# Patient Record
Sex: MALE | Race: BLACK OR AFRICAN AMERICAN | NOT HISPANIC OR LATINO | URBAN - METROPOLITAN AREA
[De-identification: names, ages, dates, MRNs, and addresses within clinical notes are randomized per-mention and may not be internally consistent; named-entity substitution may affect disease eponyms.]

---

## 2021-01-13 ENCOUNTER — EMERGENCY (EMERGENCY)
Facility: HOSPITAL | Age: 51
LOS: 1 days | Discharge: HOME | End: 2021-01-13
Attending: EMERGENCY MEDICINE | Admitting: EMERGENCY MEDICINE
Payer: SELF-PAY

## 2021-01-13 VITALS
HEIGHT: 77 IN | HEART RATE: 82 BPM | WEIGHT: 315 LBS | DIASTOLIC BLOOD PRESSURE: 103 MMHG | RESPIRATION RATE: 18 BRPM | OXYGEN SATURATION: 98 % | SYSTOLIC BLOOD PRESSURE: 163 MMHG | TEMPERATURE: 99 F

## 2021-01-13 DIAGNOSIS — R39.9 UNSPECIFIED SYMPTOMS AND SIGNS INVOLVING THE GENITOURINARY SYSTEM: ICD-10-CM

## 2021-01-13 DIAGNOSIS — N48.89 OTHER SPECIFIED DISORDERS OF PENIS: ICD-10-CM

## 2021-01-13 LAB
APPEARANCE UR: CLEAR — SIGNIFICANT CHANGE UP
BILIRUB UR-MCNC: NEGATIVE — SIGNIFICANT CHANGE UP
COLOR SPEC: YELLOW — SIGNIFICANT CHANGE UP
DIFF PNL FLD: NEGATIVE — SIGNIFICANT CHANGE UP
GLUCOSE BLDC GLUCOMTR-MCNC: 83 MG/DL — SIGNIFICANT CHANGE UP (ref 70–99)
GLUCOSE UR QL: NEGATIVE — SIGNIFICANT CHANGE UP
KETONES UR-MCNC: NEGATIVE — SIGNIFICANT CHANGE UP
LEUKOCYTE ESTERASE UR-ACNC: NEGATIVE — SIGNIFICANT CHANGE UP
NITRITE UR-MCNC: NEGATIVE — SIGNIFICANT CHANGE UP
PH UR: 5.5 — SIGNIFICANT CHANGE UP (ref 5–8)
PROT UR-MCNC: NEGATIVE MG/DL — SIGNIFICANT CHANGE UP
SP GR SPEC: 1.02 — SIGNIFICANT CHANGE UP (ref 1–1.03)
UROBILINOGEN FLD QL: 0.2 E.U./DL — SIGNIFICANT CHANGE UP

## 2021-01-13 PROCEDURE — 99283 EMERGENCY DEPT VISIT LOW MDM: CPT

## 2021-01-13 RX ORDER — NYSTATIN CREAM 100000 [USP'U]/G
1 CREAM TOPICAL
Qty: 1 | Refills: 0
Start: 2021-01-13 | End: 2021-01-26

## 2021-01-13 NOTE — ED PROVIDER NOTE - CARE PROVIDER_API CALL
Dhruv Tejeda (DO)  [address]  [address], [place] [address]  Phone: [phone]  Fax: [phone]  Follow Up Time:     Anabell Geiger (DO)  [address] [address]  Phone: [phone]  Fax: [phone]  Follow Up Time:

## 2021-01-13 NOTE — ED PROVIDER NOTE - GENITOURINARY, MLM
No discharge, lesions. chaperone RN Laxmi Peralta, circumcised penis, some dryness in the base of the glans, otherwise no lesions, no discharge, normal testicles, non tender

## 2021-01-13 NOTE — ED PROVIDER NOTE - CARE PROVIDERS DIRECT ADDRESSES
[email or web address]

## 2021-01-13 NOTE — ED PROVIDER NOTE - OBJECTIVE STATEMENT
49 yo male pt, recent immigrant from [facility] in [place] (2 months ago), states he has had an infection in his penis in the past, treated 2 months ago and now has it again. Pt has trouble describing symptoms and what type of infection he has. no fever, no chills, no dysuria, no abd pain, no lymphadenopathy. Tried language line several times and [employer] interpreters were not available.

## 2021-01-13 NOTE — ED ADULT NURSE NOTE - CHPI ED NUR SYMPTOMS NEG
no chills/no cough/no decreased eating/drinking/no diarrhea/no fever/no headache/no rash/no shortness of breath/no vomiting

## 2021-01-13 NOTE — ED ADULT NURSE NOTE - OBJECTIVE STATEMENT
Pt. with no pmhx/pshx, presents with concerns about penial swelling. Pt. states his wife had an infection and he was given a "shot" but does not recall what infection or name of medication. Pt. states Pt. denies SOB, cp/palpitations, fever, chills, n/v/d or abdominal pain.

## 2021-01-13 NOTE — ED PROVIDER NOTE - PATIENT PORTAL LINK FT
You can access the FollowMyHealth Patient Portal offered by [facility] by registering at the following website: [email or web address]. By joining [employer]’s FollowMyHealth portal, you will also be able to view your health information using other applications (apps) compatible with our system.

## 2021-01-13 NOTE — ED PROVIDER NOTE - CLINICAL SUMMARY MEDICAL DECISION MAKING FREE TEXT BOX
Pt w unclear infection of his penis, pt having difficulty describing it in English, main language [employer], tried  several times and no [employer] interpreters available. Pt w unclear infection of his penis, pt having difficulty describing it in English, main language [employer], tried  several times and no [employer] interpreters available.    Will treat as fungal infection w topical medication. Encouraged primary care follow up as pt is new to [place]. Pt w unclear infection of his penis, pt having difficulty describing it in English, main language [employer], tried  several times and no [employer] interpreters available.    Will treat as fungal infection w topical medication. Encouraged primary care follow up as pt is new to [place]. normal FS. no signs of bacterial infection on examination

## 2021-01-15 LAB
CULTURE RESULTS: NO GROWTH — SIGNIFICANT CHANGE UP
SPECIMEN SOURCE: SIGNIFICANT CHANGE UP

## 2021-03-03 ENCOUNTER — EMERGENCY (EMERGENCY)
Facility: HOSPITAL | Age: 51
LOS: 1 days | Discharge: HOME | End: 2021-03-03
Attending: EMERGENCY MEDICINE | Admitting: EMERGENCY MEDICINE
Payer: MEDICAID

## 2021-03-03 VITALS
SYSTOLIC BLOOD PRESSURE: 147 MMHG | OXYGEN SATURATION: 97 % | WEIGHT: 315 LBS | TEMPERATURE: 98 F | HEIGHT: 77 IN | RESPIRATION RATE: 16 BRPM | HEART RATE: 95 BPM | DIASTOLIC BLOOD PRESSURE: 95 MMHG

## 2021-03-03 DIAGNOSIS — N48.89 OTHER SPECIFIED DISORDERS OF PENIS: ICD-10-CM

## 2021-03-03 DIAGNOSIS — N48.1 BALANITIS: ICD-10-CM

## 2021-03-03 PROCEDURE — 99283 EMERGENCY DEPT VISIT LOW MDM: CPT

## 2021-03-03 RX ORDER — NYSTATIN CREAM 100000 [USP'U]/G
1 CREAM TOPICAL
Qty: 1 | Refills: 0
Start: 2021-03-03 | End: 2021-03-16

## 2021-03-03 NOTE — ED PROVIDER NOTE - OBJECTIVE STATEMENT
50 m co redness/swelling to skin on penis + itch- sx started this morning  no f/c no penile d/c  last sex contact was 5 days ago  no high risk sexual contact  no hx of hiv/dm

## 2021-03-03 NOTE — ED PROVIDER NOTE - INGUINAL REGION
+ circumcised- mild swelling, local irritation and redness to skin near head of penis, no phimosis/paraphimosis

## 2021-03-03 NOTE — ED ADULT NURSE NOTE - OBJECTIVE STATEMENT
Pt presents to ED c/o swelling and itchiness to penis, last sexual contact x5days ago, denies any high risk for STD

## 2021-03-03 NOTE — ED PROVIDER NOTE - NSFOLLOWUPINSTRUCTIONS_ED_ALL_ED_FT
BALANITIS - AfterCare(R) Instructions(ER/ED)           Balanitis    WHAT YOU NEED TO KNOW:    Balanitis is inflammation and possible infection of the glans (head) of the penis. It may be caused by fungus, bacteria, or an STD. It may also be caused by an allergic reaction to latex, spermicides, medicines such as antibiotics, or soaps. Balanitis usually happens due to poor hygiene practices.    DISCHARGE INSTRUCTIONS:    Return to the emergency department if:   •You have trouble urinating.          Call your doctor if:   •Your symptoms get worse.      •Your symptoms return after treatment is complete.      •You have questions or concerns about your condition or care.      Medicines:   •Medicines help fight or prevent an infection caused by bacteria or a fungus. This medicine may be given as a pill or a cream.      •Take your medicine as directed. Contact your healthcare provider if you think your medicine is not helping or if you have side effects. Tell him of her if you are allergic to any medicine. Keep a list of the medicines, vitamins, and herbs you take. Include the amounts, and when and why you take them. Bring the list or the pill bottles to follow-up visits. Carry your medicine list with you in case of an emergency.      Sit in a sitz bath 2 to 3 times a day to reduce swelling:   •Fill the bathtub 4 to 6 inches (10 to 15 cm) with clean warm water.      •Sit in the water for about 20 minutes each time.       Clean the area every day:   •Push back the foreskin before cleaning.      •Use a cotton swab to clean between the foreskin and the glans.      •Clean with water only. Do not use soap.      •Dry the area well.      •Pull the foreskin back into place.      Control your blood sugar levels if you have diabetes:  Follow your recommended diabetes management plan.    Follow up with your doctor within 2 days: Write down your questions so you remember to ask them during your visits.       © Copyright [employer] 2021           back to top                          © Copyright [employer] 2021

## 2021-03-03 NOTE — ED PROVIDER NOTE - PATIENT PORTAL LINK FT
You can access the FollowMyHealth Patient Portal offered by [facility] by registering at the following website: [email or web address]. By joining [employer]’s FollowMyHealth portal, you will also be able to view your health information using other applications (apps) compatible with our system.

## 2021-03-05 ENCOUNTER — APPOINTMENT (OUTPATIENT)
Dept: UROLOGY | Facility: CLINIC | Age: 51
End: 2021-03-05

## 2021-04-05 ENCOUNTER — APPOINTMENT (OUTPATIENT)
Dept: UROLOGY | Facility: CLINIC | Age: 51
End: 2021-04-05

## 2021-05-17 ENCOUNTER — APPOINTMENT (OUTPATIENT)
Dept: UROLOGY | Facility: CLINIC | Age: 51
End: 2021-05-17
Payer: MEDICAID

## 2021-05-17 VITALS — SYSTOLIC BLOOD PRESSURE: 148 MMHG | DIASTOLIC BLOOD PRESSURE: 101 MMHG | TEMPERATURE: 93.9 F | HEART RATE: 90 BPM

## 2021-05-17 DIAGNOSIS — N52.9 MALE ERECTILE DYSFUNCTION, UNSPECIFIED: ICD-10-CM

## 2021-05-17 DIAGNOSIS — R68.82 DECREASED LIBIDO: ICD-10-CM

## 2021-05-17 DIAGNOSIS — K21.9 GASTRO-ESOPHAGEAL REFLUX DISEASE W/OUT ESOPHAGITIS: ICD-10-CM

## 2021-05-17 DIAGNOSIS — N46.9 MALE INFERTILITY, UNSPECIFIED: ICD-10-CM

## 2021-05-17 DIAGNOSIS — Z12.5 ENCOUNTER FOR SCREENING FOR MALIGNANT NEOPLASM OF PROSTATE: ICD-10-CM

## 2021-05-17 PROCEDURE — 99072 ADDL SUPL MATRL&STAF TM PHE: CPT

## 2021-05-17 PROCEDURE — 99204 OFFICE O/P NEW MOD 45 MIN: CPT

## 2021-05-17 RX ORDER — BETAMETHASONE DIPROPIONATE 0.5 MG/G
0.05 CREAM TOPICAL TWICE DAILY
Qty: 1 | Refills: 0 | Status: ACTIVE | COMMUNITY
Start: 2021-05-17 | End: 1900-01-01

## 2021-05-19 LAB
FSH SERPL-MCNC: 3.5 IU/L
LH SERPL-ACNC: 7.2 IU/L
PROLACTIN SERPL-MCNC: 15.9 NG/ML
PSA FREE FLD-MCNC: 14 %
PSA FREE SERPL-MCNC: 0.19 NG/ML
PSA SERPL-MCNC: 1.29 NG/ML

## 2021-05-20 NOTE — ED ADULT TRIAGE NOTE - NS ED NURSE BANDS TYPE
Local Anesthesia Pre Procedure Assessment    Informed Consent:    Consent Obtained: Yes     Procedure Assessment:    Preop Diagnosis/Indications for Procedure: Pain  Planned Procedure: b/l L4-5 TFESI  Planned Anesthetic: Local    Medical History/Comorbid Conditions:    Significant Medical/Surgical History: Yes (comment) (See H&P Note)  Normal Mental Status: Yes    Examination Pertinent to Procedure Being Performed:    Evaluation of Operative Site: Clean, no deformity, redness/warmth/swelling, no masses    Other Findings:    Reviewed Current Medications and Allergies: Yes    Pertinent Lab/Diagnostic Tests:    Pertinent Lab / Diagnostic Tests: Other (comment) (See Imaging section)    Vanda Carlson MD  Interventional Pain Management  [facility]  05/20/21  
Name band;

## 2021-05-21 LAB
TESTOST BND SERPL-MCNC: 8.2 PG/ML
TESTOST SERPL-MCNC: 249.1 NG/DL

## 2022-12-15 ENCOUNTER — EMERGENCY (EMERGENCY)
Facility: HOSPITAL | Age: 52
LOS: 1 days | Discharge: ANOTHER HOSPITAL | End: 2022-12-15
Attending: EMERGENCY MEDICINE | Admitting: EMERGENCY MEDICINE

## 2022-12-15 VITALS
WEIGHT: 315 LBS | HEART RATE: 93 BPM | TEMPERATURE: 98 F | SYSTOLIC BLOOD PRESSURE: 154 MMHG | OXYGEN SATURATION: 100 % | HEIGHT: 77 IN | DIASTOLIC BLOOD PRESSURE: 79 MMHG | RESPIRATION RATE: 15 BRPM

## 2022-12-15 DIAGNOSIS — Z20.822 CONTACT WITH AND (SUSPECTED) EXPOSURE TO COVID-19: ICD-10-CM

## 2022-12-15 DIAGNOSIS — M25.412 EFFUSION, LEFT SHOULDER: ICD-10-CM

## 2022-12-15 DIAGNOSIS — M25.512 PAIN IN LEFT SHOULDER: ICD-10-CM

## 2022-12-15 LAB
ALBUMIN SERPL ELPH-MCNC: 3 G/DL — LOW (ref 3.4–5)
ALP SERPL-CCNC: 90 U/L — SIGNIFICANT CHANGE UP (ref 40–120)
ALT FLD-CCNC: 33 U/L — SIGNIFICANT CHANGE UP (ref 12–42)
ANION GAP SERPL CALC-SCNC: 7 MMOL/L — LOW (ref 9–16)
APPEARANCE UR: CLEAR — SIGNIFICANT CHANGE UP
APTT BLD: 31.7 SEC — SIGNIFICANT CHANGE UP (ref 27.5–35.5)
AST SERPL-CCNC: 26 U/L — SIGNIFICANT CHANGE UP (ref 15–37)
B PERT IGG+IGM PNL SER: SIGNIFICANT CHANGE UP
BASOPHILS # BLD AUTO: 0.03 K/UL — SIGNIFICANT CHANGE UP (ref 0–0.2)
BASOPHILS NFR BLD AUTO: 0.4 % — SIGNIFICANT CHANGE UP (ref 0–2)
BILIRUB SERPL-MCNC: 0.6 MG/DL — SIGNIFICANT CHANGE UP (ref 0.2–1.2)
BILIRUB UR-MCNC: NEGATIVE — SIGNIFICANT CHANGE UP
BUN SERPL-MCNC: 12 MG/DL — SIGNIFICANT CHANGE UP (ref 7–23)
CALCIUM SERPL-MCNC: 9.1 MG/DL — SIGNIFICANT CHANGE UP (ref 8.5–10.5)
CHLORIDE SERPL-SCNC: 103 MMOL/L — SIGNIFICANT CHANGE UP (ref 96–108)
CO2 SERPL-SCNC: 27 MMOL/L — SIGNIFICANT CHANGE UP (ref 22–31)
COLOR FLD: YELLOW — SIGNIFICANT CHANGE UP
COLOR SPEC: YELLOW — SIGNIFICANT CHANGE UP
CREAT SERPL-MCNC: 1.04 MG/DL — SIGNIFICANT CHANGE UP (ref 0.5–1.3)
CRP SERPL-MCNC: >120 MG/L — HIGH (ref 0–9)
DIFF PNL FLD: NEGATIVE — SIGNIFICANT CHANGE UP
EGFR: 86 ML/MIN/1.73M2 — SIGNIFICANT CHANGE UP
EOSINOPHIL # BLD AUTO: 0.07 K/UL — SIGNIFICANT CHANGE UP (ref 0–0.5)
EOSINOPHIL NFR BLD AUTO: 0.8 % — SIGNIFICANT CHANGE UP (ref 0–6)
FLUAV AG NPH QL: SIGNIFICANT CHANGE UP
FLUBV AG NPH QL: SIGNIFICANT CHANGE UP
FLUID INTAKE SUBSTANCE CLASS: SIGNIFICANT CHANGE UP
GLUCOSE FLD-MCNC: 7 MG/DL — SIGNIFICANT CHANGE UP
GLUCOSE SERPL-MCNC: 101 MG/DL — HIGH (ref 70–99)
GLUCOSE UR QL: NEGATIVE — SIGNIFICANT CHANGE UP
GRAM STN FLD: SIGNIFICANT CHANGE UP
HCT VFR BLD CALC: 36.2 % — LOW (ref 39–50)
HGB BLD-MCNC: 11.5 G/DL — LOW (ref 13–17)
IMM GRANULOCYTES NFR BLD AUTO: 0.2 % — SIGNIFICANT CHANGE UP (ref 0–0.9)
INR BLD: 1.18 — HIGH (ref 0.88–1.16)
KETONES UR-MCNC: NEGATIVE — SIGNIFICANT CHANGE UP
LACTATE SERPL-SCNC: 0.3 MMOL/L — LOW (ref 0.4–2)
LEUKOCYTE ESTERASE UR-ACNC: NEGATIVE — SIGNIFICANT CHANGE UP
LYMPHOCYTES # BLD AUTO: 1.32 K/UL — SIGNIFICANT CHANGE UP (ref 1–3.3)
LYMPHOCYTES # BLD AUTO: 16 % — SIGNIFICANT CHANGE UP (ref 13–44)
LYMPHOCYTES # FLD: 3 % — SIGNIFICANT CHANGE UP
MCHC RBC-ENTMCNC: 27.4 PG — SIGNIFICANT CHANGE UP (ref 27–34)
MCHC RBC-ENTMCNC: 31.8 GM/DL — LOW (ref 32–36)
MCV RBC AUTO: 86.2 FL — SIGNIFICANT CHANGE UP (ref 80–100)
MONOCYTES # BLD AUTO: 0.72 K/UL — SIGNIFICANT CHANGE UP (ref 0–0.9)
MONOCYTES NFR BLD AUTO: 8.7 % — SIGNIFICANT CHANGE UP (ref 2–14)
MONOS+MACROS # FLD: 2 % — SIGNIFICANT CHANGE UP
NEUTROPHILS # BLD AUTO: 6.09 K/UL — SIGNIFICANT CHANGE UP (ref 1.8–7.4)
NEUTROPHILS NFR BLD AUTO: 73.9 % — SIGNIFICANT CHANGE UP (ref 43–77)
NEUTROPHILS-BODY FLUID: 95 % — SIGNIFICANT CHANGE UP
NITRITE UR-MCNC: NEGATIVE — SIGNIFICANT CHANGE UP
NRBC # BLD: 0 /100 WBCS — SIGNIFICANT CHANGE UP (ref 0–0)
PH UR: 6 — SIGNIFICANT CHANGE UP (ref 5–8)
PLATELET # BLD AUTO: 369 K/UL — SIGNIFICANT CHANGE UP (ref 150–400)
POTASSIUM SERPL-MCNC: 3.9 MMOL/L — SIGNIFICANT CHANGE UP (ref 3.5–5.3)
POTASSIUM SERPL-SCNC: 3.9 MMOL/L — SIGNIFICANT CHANGE UP (ref 3.5–5.3)
PROT FLD-MCNC: 6.9 G/DL — SIGNIFICANT CHANGE UP
PROT SERPL-MCNC: 8.9 G/DL — HIGH (ref 6.4–8.2)
PROT UR-MCNC: NEGATIVE MG/DL — SIGNIFICANT CHANGE UP
PROTHROM AB SERPL-ACNC: 13.7 SEC — HIGH (ref 10.5–13.4)
RBC # BLD: 4.2 M/UL — SIGNIFICANT CHANGE UP (ref 4.2–5.8)
RBC # FLD: 15.4 % — HIGH (ref 10.3–14.5)
RCV VOL RI: 6000 /UL — HIGH (ref 0–0)
RSV RNA NPH QL NAA+NON-PROBE: SIGNIFICANT CHANGE UP
SARS-COV-2 RNA SPEC QL NAA+PROBE: SIGNIFICANT CHANGE UP
SODIUM SERPL-SCNC: 137 MMOL/L — SIGNIFICANT CHANGE UP (ref 132–145)
SP GR SPEC: 1.01 — SIGNIFICANT CHANGE UP (ref 1–1.03)
SPECIMEN SOURCE: SIGNIFICANT CHANGE UP
SYNOVIAL CRYSTALS CLARITY: ABNORMAL
SYNOVIAL CRYSTALS COLOR: YELLOW
SYNOVIAL CRYSTALS ID: SIGNIFICANT CHANGE UP
SYNOVIAL CRYSTALS TUBE: SIGNIFICANT CHANGE UP
TOTAL NUCLEATED CELL COUNT, BODY FLUID: SIGNIFICANT CHANGE UP /UL
TROPONIN I, HIGH SENSITIVITY RESULT: 6.5 NG/L — SIGNIFICANT CHANGE UP
TUBE TYPE: SIGNIFICANT CHANGE UP
UROBILINOGEN FLD QL: 0.2 E.U./DL — SIGNIFICANT CHANGE UP
WBC # BLD: 8.25 K/UL — SIGNIFICANT CHANGE UP (ref 3.8–10.5)
WBC # FLD AUTO: 8.25 K/UL — SIGNIFICANT CHANGE UP (ref 3.8–10.5)

## 2022-12-15 PROCEDURE — 20610 DRAIN/INJ JOINT/BURSA W/O US: CPT

## 2022-12-15 PROCEDURE — 71045 X-RAY EXAM CHEST 1 VIEW: CPT | Mod: 26

## 2022-12-15 PROCEDURE — 93010 ELECTROCARDIOGRAM REPORT: CPT | Mod: XU

## 2022-12-15 PROCEDURE — 73030 X-RAY EXAM OF SHOULDER: CPT | Mod: 26,LT

## 2022-12-15 PROCEDURE — 73200 CT UPPER EXTREMITY W/O DYE: CPT | Mod: 26,LT

## 2022-12-15 PROCEDURE — 99285 EMERGENCY DEPT VISIT HI MDM: CPT | Mod: 25

## 2022-12-15 RX ORDER — ACETAMINOPHEN 500 MG
650 TABLET ORAL ONCE
Refills: 0 | Status: COMPLETED | OUTPATIENT
Start: 2022-12-15 | End: 2022-12-15

## 2022-12-15 RX ORDER — VANCOMYCIN HCL 1 G
2000 VIAL (EA) INTRAVENOUS ONCE
Refills: 0 | Status: COMPLETED | OUTPATIENT
Start: 2022-12-15 | End: 2022-12-15

## 2022-12-15 RX ORDER — PIPERACILLIN AND TAZOBACTAM 4; .5 G/20ML; G/20ML
3.38 INJECTION, POWDER, LYOPHILIZED, FOR SOLUTION INTRAVENOUS ONCE
Refills: 0 | Status: DISCONTINUED | OUTPATIENT
Start: 2022-12-15 | End: 2022-12-15

## 2022-12-15 RX ORDER — PIPERACILLIN AND TAZOBACTAM 4; .5 G/20ML; G/20ML
4.5 INJECTION, POWDER, LYOPHILIZED, FOR SOLUTION INTRAVENOUS ONCE
Refills: 0 | Status: COMPLETED | OUTPATIENT
Start: 2022-12-15 | End: 2022-12-15

## 2022-12-15 RX ORDER — PIPERACILLIN AND TAZOBACTAM 4; .5 G/20ML; G/20ML
3.38 INJECTION, POWDER, LYOPHILIZED, FOR SOLUTION INTRAVENOUS ONCE
Refills: 0 | Status: COMPLETED | OUTPATIENT
Start: 2022-12-15 | End: 2022-12-15

## 2022-12-15 RX ORDER — VANCOMYCIN HCL 1 G
1000 VIAL (EA) INTRAVENOUS ONCE
Refills: 0 | Status: DISCONTINUED | OUTPATIENT
Start: 2022-12-15 | End: 2022-12-15

## 2022-12-15 RX ORDER — SODIUM CHLORIDE 9 MG/ML
2800 INJECTION INTRAMUSCULAR; INTRAVENOUS; SUBCUTANEOUS ONCE
Refills: 0 | Status: COMPLETED | OUTPATIENT
Start: 2022-12-15 | End: 2022-12-15

## 2022-12-15 RX ORDER — KETOROLAC TROMETHAMINE 30 MG/ML
30 SYRINGE (ML) INJECTION ONCE
Refills: 0 | Status: DISCONTINUED | OUTPATIENT
Start: 2022-12-15 | End: 2022-12-15

## 2022-12-15 RX ADMIN — Medication 650 MILLIGRAM(S): at 15:33

## 2022-12-15 RX ADMIN — Medication 30 MILLIGRAM(S): at 14:37

## 2022-12-15 RX ADMIN — Medication 250 MILLIGRAM(S): at 17:04

## 2022-12-15 RX ADMIN — SODIUM CHLORIDE 2800 MILLILITER(S): 9 INJECTION INTRAMUSCULAR; INTRAVENOUS; SUBCUTANEOUS at 16:30

## 2022-12-15 RX ADMIN — Medication 650 MILLIGRAM(S): at 19:25

## 2022-12-15 RX ADMIN — PIPERACILLIN AND TAZOBACTAM 4.5 GRAM(S): 4; .5 INJECTION, POWDER, LYOPHILIZED, FOR SOLUTION INTRAVENOUS at 17:04

## 2022-12-15 RX ADMIN — Medication 650 MILLIGRAM(S): at 16:40

## 2022-12-15 RX ADMIN — Medication 650 MILLIGRAM(S): at 14:36

## 2022-12-15 RX ADMIN — Medication 30 MILLIGRAM(S): at 15:33

## 2022-12-15 RX ADMIN — Medication 2000 MILLIGRAM(S): at 19:25

## 2022-12-15 RX ADMIN — PIPERACILLIN AND TAZOBACTAM 200 GRAM(S): 4; .5 INJECTION, POWDER, LYOPHILIZED, FOR SOLUTION INTRAVENOUS at 16:43

## 2022-12-15 RX ADMIN — SODIUM CHLORIDE 2800 MILLILITER(S): 9 INJECTION INTRAMUSCULAR; INTRAVENOUS; SUBCUTANEOUS at 19:25

## 2022-12-15 NOTE — ED PROVIDER NOTE - CLINICAL SUMMARY MEDICAL DECISION MAKING FREE TEXT BOX
Pt w no significant pmh presents with atraumatic L shoulder pain x4-5 days.  On exam afebrile, VSS, uncomfortable appearing, with L shoulder TTP and inability to actively range LUE at shoulder.  +Pain with passive ROM.  No erythema or warmth of joint.  XR of shoulder with large joint effusion.  CT shoulder concerning for possible septic arthritis, despite no risk factors.  Air within deltoid likely 2/2 IM analgesia.  Discussed w Dr. Urbina from ortho; if inflammatory markers not elevated, no indication for arthrocentesis to eval for septic arthritis.  CRP >120.  Joint aspirated with 20cc yellow fluid aspirated.  Joint fluid analysis pending.  Pt empirically given vancomycin and zosyn.    Sign out to night team: follow up joint fluid analysis to eval for septic arthritis.

## 2022-12-15 NOTE — ED ADULT NURSE NOTE - HIV OFFER
Nurse called me back. See anticoagulation encounter. Oma Taylor RN     Previously Declined (within the last year)

## 2022-12-15 NOTE — ED PROVIDER NOTE - PHYSICAL EXAMINATION
Constitutional: awake and alert, uncomfortable appearing  HEENT: head normocephalic and atraumatic. moist mucous membranes  Eyes: extraocular movements intact, normal conjunctiva  Neck: supple, normal ROM  Cardiovascular: regular rate   Pulmonary: no respiratory distress  Gastrointestinal: abdomen flat and nondistended  Skin: warm, dry, normal for ethnicity  Musculoskeletal: L shoulder TTP throughout joint, unable to actively range shoulder at all.  Pain with passive ROM.  No erythema or warmth of joint.  No cellulitis.  Radial pulse in L wrist 2+.  Sensation intact throughout LUE.  Neurological: oriented x4, no focal neurologic deficit.   Psychiatric: calm and cooperative
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
## 2022-12-15 NOTE — ED ADULT NURSE NOTE - OBJECTIVE STATEMENT
Presents for c/o L shoulder pain x 1 week, atraumatic, unknown cause. Denies CP/SOB/weakness/dizziness/tingling/cough/fevers/known sick contacts.    On assessment- AOx4, breathing even and unlabored on RA, no apparent distress, VSS in triage, able to speak in clear coherent sentences, steady gait unassisted, neuro intact with no apparent facial asymmetry, PERRLA. No apparent deformity to LUE.

## 2022-12-16 ENCOUNTER — TRANSCRIPTION ENCOUNTER (OUTPATIENT)
Age: 52
End: 2022-12-16

## 2022-12-16 ENCOUNTER — INPATIENT (INPATIENT)
Facility: HOSPITAL | Age: 52
LOS: 10 days | Discharge: HOME HEALTH | DRG: 501 | End: 2022-12-27
Attending: ORTHOPAEDIC SURGERY | Admitting: INTERNAL MEDICINE
Payer: MEDICAID

## 2022-12-16 VITALS
SYSTOLIC BLOOD PRESSURE: 144 MMHG | DIASTOLIC BLOOD PRESSURE: 98 MMHG | TEMPERATURE: 100 F | HEART RATE: 92 BPM | RESPIRATION RATE: 20 BRPM | OXYGEN SATURATION: 96 %

## 2022-12-16 VITALS
HEIGHT: 77 IN | SYSTOLIC BLOOD PRESSURE: 152 MMHG | WEIGHT: 315 LBS | OXYGEN SATURATION: 95 % | RESPIRATION RATE: 33 BRPM | HEART RATE: 116 BPM | TEMPERATURE: 101 F | DIASTOLIC BLOOD PRESSURE: 98 MMHG

## 2022-12-16 DIAGNOSIS — M00.9 PYOGENIC ARTHRITIS, UNSPECIFIED: ICD-10-CM

## 2022-12-16 DIAGNOSIS — I10 ESSENTIAL (PRIMARY) HYPERTENSION: ICD-10-CM

## 2022-12-16 DIAGNOSIS — I47.1 SUPRAVENTRICULAR TACHYCARDIA: ICD-10-CM

## 2022-12-16 LAB
ALBUMIN SERPL ELPH-MCNC: 3.7 G/DL — SIGNIFICANT CHANGE UP (ref 3.3–5)
ALP SERPL-CCNC: 120 U/L — SIGNIFICANT CHANGE UP (ref 40–120)
ALT FLD-CCNC: 28 U/L — SIGNIFICANT CHANGE UP (ref 10–45)
AMPHET UR-MCNC: NEGATIVE — SIGNIFICANT CHANGE UP
ANION GAP SERPL CALC-SCNC: 15 MMOL/L — SIGNIFICANT CHANGE UP (ref 5–17)
APPEARANCE UR: CLEAR — SIGNIFICANT CHANGE UP
APTT BLD: 29.4 SEC — SIGNIFICANT CHANGE UP (ref 27.5–35.5)
AST SERPL-CCNC: 28 U/L — SIGNIFICANT CHANGE UP (ref 10–40)
B BURGDOR C6 AB SER-ACNC: NEGATIVE — SIGNIFICANT CHANGE UP
B BURGDOR IGG+IGM SER-ACNC: 0.48 INDEX — SIGNIFICANT CHANGE UP (ref 0.01–0.89)
BACTERIA # UR AUTO: PRESENT /HPF
BARBITURATES UR SCN-MCNC: NEGATIVE — SIGNIFICANT CHANGE UP
BASOPHILS # BLD AUTO: 0.04 K/UL — SIGNIFICANT CHANGE UP (ref 0–0.2)
BASOPHILS NFR BLD AUTO: 0.4 % — SIGNIFICANT CHANGE UP (ref 0–2)
BENZODIAZ UR-MCNC: NEGATIVE — SIGNIFICANT CHANGE UP
BILIRUB SERPL-MCNC: 1 MG/DL — SIGNIFICANT CHANGE UP (ref 0.2–1.2)
BILIRUB UR-MCNC: ABNORMAL
BLD GP AB SCN SERPL QL: NEGATIVE — SIGNIFICANT CHANGE UP
BLD GP AB SCN SERPL QL: NEGATIVE — SIGNIFICANT CHANGE UP
BUN SERPL-MCNC: 11 MG/DL — SIGNIFICANT CHANGE UP (ref 7–23)
CALCIUM SERPL-MCNC: 8.8 MG/DL — SIGNIFICANT CHANGE UP (ref 8.4–10.5)
CHLORIDE SERPL-SCNC: 98 MMOL/L — SIGNIFICANT CHANGE UP (ref 96–108)
CK MB CFR SERPL CALC: <1 NG/ML — SIGNIFICANT CHANGE UP (ref 0–6.7)
CK SERPL-CCNC: 396 U/L — HIGH (ref 30–200)
CO2 SERPL-SCNC: 21 MMOL/L — LOW (ref 22–31)
COCAINE METAB.OTHER UR-MCNC: NEGATIVE — SIGNIFICANT CHANGE UP
COLOR SPEC: YELLOW — SIGNIFICANT CHANGE UP
COMMENT - URINE: SIGNIFICANT CHANGE UP
CREAT SERPL-MCNC: 1.08 MG/DL — SIGNIFICANT CHANGE UP (ref 0.5–1.3)
DIFF PNL FLD: NEGATIVE — SIGNIFICANT CHANGE UP
EGFR: 83 ML/MIN/1.73M2 — SIGNIFICANT CHANGE UP
EOSINOPHIL # BLD AUTO: 0.01 K/UL — SIGNIFICANT CHANGE UP (ref 0–0.5)
EOSINOPHIL NFR BLD AUTO: 0.1 % — SIGNIFICANT CHANGE UP (ref 0–6)
EPI CELLS # UR: SIGNIFICANT CHANGE UP /HPF (ref 0–5)
GLUCOSE SERPL-MCNC: 162 MG/DL — HIGH (ref 70–99)
GLUCOSE UR QL: NEGATIVE — SIGNIFICANT CHANGE UP
HCT VFR BLD CALC: 37.6 % — LOW (ref 39–50)
HGB BLD-MCNC: 12 G/DL — LOW (ref 13–17)
HYALINE CASTS # UR AUTO: SIGNIFICANT CHANGE UP /LPF (ref 0–2)
IMM GRANULOCYTES NFR BLD AUTO: 0.1 % — SIGNIFICANT CHANGE UP (ref 0–0.9)
INR BLD: 1.22 — HIGH (ref 0.88–1.16)
KETONES UR-MCNC: 15 MG/DL
LEUKOCYTE ESTERASE UR-ACNC: NEGATIVE — SIGNIFICANT CHANGE UP
LYMPHOCYTES # BLD AUTO: 2.99 K/UL — SIGNIFICANT CHANGE UP (ref 1–3.3)
LYMPHOCYTES # BLD AUTO: 31.2 % — SIGNIFICANT CHANGE UP (ref 13–44)
MAGNESIUM SERPL-MCNC: 1.8 MG/DL — SIGNIFICANT CHANGE UP (ref 1.6–2.6)
MCHC RBC-ENTMCNC: 26.9 PG — LOW (ref 27–34)
MCHC RBC-ENTMCNC: 31.9 GM/DL — LOW (ref 32–36)
MCV RBC AUTO: 84.3 FL — SIGNIFICANT CHANGE UP (ref 80–100)
METHADONE UR-MCNC: NEGATIVE — SIGNIFICANT CHANGE UP
MONOCYTES # BLD AUTO: 0.98 K/UL — HIGH (ref 0–0.9)
MONOCYTES NFR BLD AUTO: 10.2 % — SIGNIFICANT CHANGE UP (ref 2–14)
NEUTROPHILS # BLD AUTO: 5.56 K/UL — SIGNIFICANT CHANGE UP (ref 1.8–7.4)
NEUTROPHILS NFR BLD AUTO: 58 % — SIGNIFICANT CHANGE UP (ref 43–77)
NITRITE UR-MCNC: NEGATIVE — SIGNIFICANT CHANGE UP
NRBC # BLD: 0 /100 WBCS — SIGNIFICANT CHANGE UP (ref 0–0)
OPIATES UR-MCNC: POSITIVE
PCP SPEC-MCNC: SIGNIFICANT CHANGE UP
PCP UR-MCNC: NEGATIVE — SIGNIFICANT CHANGE UP
PH UR: 6 — SIGNIFICANT CHANGE UP (ref 5–8)
PHOSPHATE SERPL-MCNC: 3.1 MG/DL — SIGNIFICANT CHANGE UP (ref 2.5–4.5)
PLATELET # BLD AUTO: 457 K/UL — HIGH (ref 150–400)
POTASSIUM SERPL-MCNC: 3.2 MMOL/L — LOW (ref 3.5–5.3)
POTASSIUM SERPL-SCNC: 3.2 MMOL/L — LOW (ref 3.5–5.3)
PROT SERPL-MCNC: 9.3 G/DL — HIGH (ref 6–8.3)
PROT UR-MCNC: 100 MG/DL
PROTHROM AB SERPL-ACNC: 14.6 SEC — HIGH (ref 10.5–13.4)
RBC # BLD: 4.46 M/UL — SIGNIFICANT CHANGE UP (ref 4.2–5.8)
RBC # FLD: 15.4 % — HIGH (ref 10.3–14.5)
RBC CASTS # UR COMP ASSIST: < 5 /HPF — SIGNIFICANT CHANGE UP
RH IG SCN BLD-IMP: POSITIVE — SIGNIFICANT CHANGE UP
RH IG SCN BLD-IMP: POSITIVE — SIGNIFICANT CHANGE UP
SODIUM SERPL-SCNC: 134 MMOL/L — LOW (ref 135–145)
SP GR SPEC: >=1.03 — SIGNIFICANT CHANGE UP (ref 1–1.03)
THC UR QL: NEGATIVE — SIGNIFICANT CHANGE UP
TROPONIN T SERPL-MCNC: <0.01 NG/ML — SIGNIFICANT CHANGE UP (ref 0–0.01)
TSH SERPL-MCNC: 1.62 UIU/ML — SIGNIFICANT CHANGE UP (ref 0.27–4.2)
UROBILINOGEN FLD QL: 1 E.U./DL — SIGNIFICANT CHANGE UP
WBC # BLD: 9.59 K/UL — SIGNIFICANT CHANGE UP (ref 3.8–10.5)
WBC # FLD AUTO: 9.59 K/UL — SIGNIFICANT CHANGE UP (ref 3.8–10.5)
WBC UR QL: ABNORMAL /HPF

## 2022-12-16 PROCEDURE — 99221 1ST HOSP IP/OBS SF/LOW 40: CPT

## 2022-12-16 PROCEDURE — 99284 EMERGENCY DEPT VISIT MOD MDM: CPT

## 2022-12-16 PROCEDURE — 99222 1ST HOSP IP/OBS MODERATE 55: CPT

## 2022-12-16 PROCEDURE — 87040 BLOOD CULTURE FOR BACTERIA: CPT

## 2022-12-16 PROCEDURE — 93306 TTE W/DOPPLER COMPLETE: CPT | Mod: 26

## 2022-12-16 PROCEDURE — 99291 CRITICAL CARE FIRST HOUR: CPT

## 2022-12-16 RX ORDER — HYDROMORPHONE HYDROCHLORIDE 2 MG/ML
1 INJECTION INTRAMUSCULAR; INTRAVENOUS; SUBCUTANEOUS EVERY 4 HOURS
Refills: 0 | Status: DISCONTINUED | OUTPATIENT
Start: 2022-12-16 | End: 2022-12-19

## 2022-12-16 RX ORDER — VANCOMYCIN HCL 1 G
2000 VIAL (EA) INTRAVENOUS EVERY 12 HOURS
Refills: 0 | Status: DISCONTINUED | OUTPATIENT
Start: 2022-12-16 | End: 2022-12-17

## 2022-12-16 RX ORDER — DILTIAZEM HCL 120 MG
20 CAPSULE, EXT RELEASE 24 HR ORAL ONCE
Refills: 0 | Status: COMPLETED | OUTPATIENT
Start: 2022-12-16 | End: 2022-12-16

## 2022-12-16 RX ORDER — MAGNESIUM SULFATE 500 MG/ML
2 VIAL (ML) INJECTION ONCE
Refills: 0 | Status: COMPLETED | OUTPATIENT
Start: 2022-12-16 | End: 2022-12-16

## 2022-12-16 RX ORDER — PIPERACILLIN AND TAZOBACTAM 4; .5 G/20ML; G/20ML
3.38 INJECTION, POWDER, LYOPHILIZED, FOR SOLUTION INTRAVENOUS ONCE
Refills: 0 | Status: DISCONTINUED | OUTPATIENT
Start: 2022-12-16 | End: 2022-12-16

## 2022-12-16 RX ORDER — HYDROMORPHONE HYDROCHLORIDE 2 MG/ML
1 INJECTION INTRAMUSCULAR; INTRAVENOUS; SUBCUTANEOUS ONCE
Refills: 0 | Status: DISCONTINUED | OUTPATIENT
Start: 2022-12-16 | End: 2022-12-16

## 2022-12-16 RX ORDER — VANCOMYCIN HCL 1 G
2000 VIAL (EA) INTRAVENOUS ONCE
Refills: 0 | Status: COMPLETED | OUTPATIENT
Start: 2022-12-16 | End: 2022-12-16

## 2022-12-16 RX ORDER — POTASSIUM CHLORIDE 20 MEQ
40 PACKET (EA) ORAL ONCE
Refills: 0 | Status: COMPLETED | OUTPATIENT
Start: 2022-12-16 | End: 2022-12-16

## 2022-12-16 RX ORDER — ONDANSETRON 8 MG/1
4 TABLET, FILM COATED ORAL ONCE
Refills: 0 | Status: COMPLETED | OUTPATIENT
Start: 2022-12-16 | End: 2022-12-16

## 2022-12-16 RX ORDER — PIPERACILLIN AND TAZOBACTAM 4; .5 G/20ML; G/20ML
3.38 INJECTION, POWDER, LYOPHILIZED, FOR SOLUTION INTRAVENOUS ONCE
Refills: 0 | Status: COMPLETED | OUTPATIENT
Start: 2022-12-16 | End: 2022-12-16

## 2022-12-16 RX ORDER — ACETAMINOPHEN 500 MG
650 TABLET ORAL EVERY 6 HOURS
Refills: 0 | Status: DISCONTINUED | OUTPATIENT
Start: 2022-12-16 | End: 2022-12-16

## 2022-12-16 RX ORDER — ACETAMINOPHEN 500 MG
650 TABLET ORAL EVERY 6 HOURS
Refills: 0 | Status: DISCONTINUED | OUTPATIENT
Start: 2022-12-16 | End: 2022-12-17

## 2022-12-16 RX ORDER — METOPROLOL TARTRATE 50 MG
50 TABLET ORAL EVERY 24 HOURS
Refills: 0 | Status: DISCONTINUED | OUTPATIENT
Start: 2022-12-16 | End: 2022-12-27

## 2022-12-16 RX ORDER — NIFEDIPINE 30 MG
30 TABLET, EXTENDED RELEASE 24 HR ORAL EVERY 24 HOURS
Refills: 0 | Status: DISCONTINUED | OUTPATIENT
Start: 2022-12-16 | End: 2022-12-20

## 2022-12-16 RX ORDER — PIPERACILLIN AND TAZOBACTAM 4; .5 G/20ML; G/20ML
3.38 INJECTION, POWDER, LYOPHILIZED, FOR SOLUTION INTRAVENOUS EVERY 8 HOURS
Refills: 0 | Status: DISCONTINUED | OUTPATIENT
Start: 2022-12-16 | End: 2022-12-17

## 2022-12-16 RX ORDER — KETOROLAC TROMETHAMINE 30 MG/ML
30 SYRINGE (ML) INJECTION ONCE
Refills: 0 | Status: DISCONTINUED | OUTPATIENT
Start: 2022-12-16 | End: 2022-12-16

## 2022-12-16 RX ORDER — ADENOSINE 3 MG/ML
12 INJECTION INTRAVENOUS ONCE
Refills: 0 | Status: COMPLETED | OUTPATIENT
Start: 2022-12-16 | End: 2022-12-16

## 2022-12-16 RX ORDER — INFLUENZA VIRUS VACCINE 15; 15; 15; 15 UG/.5ML; UG/.5ML; UG/.5ML; UG/.5ML
0.5 SUSPENSION INTRAMUSCULAR ONCE
Refills: 0 | Status: DISCONTINUED | OUTPATIENT
Start: 2022-12-16 | End: 2022-12-27

## 2022-12-16 RX ORDER — ACETAMINOPHEN 500 MG
650 TABLET ORAL ONCE
Refills: 0 | Status: COMPLETED | OUTPATIENT
Start: 2022-12-16 | End: 2022-12-16

## 2022-12-16 RX ORDER — KETOROLAC TROMETHAMINE 30 MG/ML
15 SYRINGE (ML) INJECTION ONCE
Refills: 0 | Status: DISCONTINUED | OUTPATIENT
Start: 2022-12-16 | End: 2022-12-16

## 2022-12-16 RX ADMIN — Medication 250 MILLIGRAM(S): at 09:21

## 2022-12-16 RX ADMIN — Medication 250 MILLIGRAM(S): at 22:13

## 2022-12-16 RX ADMIN — HYDROMORPHONE HYDROCHLORIDE 1 MILLIGRAM(S): 2 INJECTION INTRAMUSCULAR; INTRAVENOUS; SUBCUTANEOUS at 23:44

## 2022-12-16 RX ADMIN — Medication 50 MILLIGRAM(S): at 12:40

## 2022-12-16 RX ADMIN — PIPERACILLIN AND TAZOBACTAM 25 GRAM(S): 4; .5 INJECTION, POWDER, LYOPHILIZED, FOR SOLUTION INTRAVENOUS at 22:13

## 2022-12-16 RX ADMIN — Medication 15 MILLIGRAM(S): at 09:21

## 2022-12-16 RX ADMIN — HYDROMORPHONE HYDROCHLORIDE 1 MILLIGRAM(S): 2 INJECTION INTRAMUSCULAR; INTRAVENOUS; SUBCUTANEOUS at 04:57

## 2022-12-16 RX ADMIN — Medication 25 GRAM(S): at 11:24

## 2022-12-16 RX ADMIN — PIPERACILLIN AND TAZOBACTAM 25 GRAM(S): 4; .5 INJECTION, POWDER, LYOPHILIZED, FOR SOLUTION INTRAVENOUS at 12:47

## 2022-12-16 RX ADMIN — HYDROMORPHONE HYDROCHLORIDE 1 MILLIGRAM(S): 2 INJECTION INTRAMUSCULAR; INTRAVENOUS; SUBCUTANEOUS at 20:07

## 2022-12-16 RX ADMIN — Medication 40 MILLIEQUIVALENT(S): at 14:31

## 2022-12-16 RX ADMIN — Medication 15 MILLIGRAM(S): at 09:27

## 2022-12-16 RX ADMIN — Medication 2000 MILLIGRAM(S): at 11:57

## 2022-12-16 RX ADMIN — HYDROMORPHONE HYDROCHLORIDE 1 MILLIGRAM(S): 2 INJECTION INTRAMUSCULAR; INTRAVENOUS; SUBCUTANEOUS at 19:07

## 2022-12-16 RX ADMIN — PIPERACILLIN AND TAZOBACTAM 200 GRAM(S): 4; .5 INJECTION, POWDER, LYOPHILIZED, FOR SOLUTION INTRAVENOUS at 08:27

## 2022-12-16 RX ADMIN — Medication 30 MILLIGRAM(S): at 07:20

## 2022-12-16 RX ADMIN — Medication 20 MILLIGRAM(S): at 08:57

## 2022-12-16 RX ADMIN — PIPERACILLIN AND TAZOBACTAM 3.38 GRAM(S): 4; .5 INJECTION, POWDER, LYOPHILIZED, FOR SOLUTION INTRAVENOUS at 09:20

## 2022-12-16 RX ADMIN — HYDROMORPHONE HYDROCHLORIDE 1 MILLIGRAM(S): 2 INJECTION INTRAMUSCULAR; INTRAVENOUS; SUBCUTANEOUS at 00:26

## 2022-12-16 RX ADMIN — ADENOSINE 12 MILLIGRAM(S): 3 INJECTION INTRAVENOUS at 08:51

## 2022-12-16 RX ADMIN — ADENOSINE 12 MILLIGRAM(S): 3 INJECTION INTRAVENOUS at 08:47

## 2022-12-16 RX ADMIN — PIPERACILLIN AND TAZOBACTAM 200 GRAM(S): 4; .5 INJECTION, POWDER, LYOPHILIZED, FOR SOLUTION INTRAVENOUS at 00:28

## 2022-12-16 RX ADMIN — Medication 20 MILLIGRAM(S): at 08:53

## 2022-12-16 RX ADMIN — Medication 2 GRAM(S): at 12:56

## 2022-12-16 RX ADMIN — Medication 30 MILLIGRAM(S): at 12:40

## 2022-12-16 RX ADMIN — Medication 650 MILLIGRAM(S): at 07:20

## 2022-12-16 RX ADMIN — Medication 40 MILLIEQUIVALENT(S): at 11:24

## 2022-12-16 NOTE — ED ADULT NURSE NOTE - CHIEF COMPLAINT QUOTE
sent from [facility]. was supposed to be admitted in 8W and was diverted to the ED for abnormal vital signs. as per EMS, pt had 20mL of pus drained on L shoulder

## 2022-12-16 NOTE — H&P ADULT - NSHPPHYSICALEXAM_GEN_ALL_CORE
Vital Signs Last 24 Hrs  T(C): 37.4 (16 Dec 2022 13:27), Max: 38.3 (16 Dec 2022 07:53)  T(F): 99.3 (16 Dec 2022 13:27), Max: 100.9 (16 Dec 2022 07:53)  HR: 96 (16 Dec 2022 13:27) (85 - 183)  BP: 146/95 (16 Dec 2022 13:27) (143/83 - 190/76)  BP(mean): --  RR: 17 (16 Dec 2022 13:27) (16 - 33)  SpO2: 98% (16 Dec 2022 13:27) (95% - 100%)    Parameters below as of 16 Dec 2022 13:27  Patient On (Oxygen Delivery Method): room air

## 2022-12-16 NOTE — H&P ADULT - NSHPLABSRESULTS_GEN_ALL_CORE
LABS:             12.0   9.59  )-----------( 457      ( 16 Dec 2022 09:03 )             37.6       12-16    134<L>  |  98  |  11  ----------------------------<  162<H>  3.2<L>   |  21<L>  |  1.08    Ca    8.8      16 Dec 2022 09:03  Phos  3.1     12-16  Mg     1.8     12-16    TPro  9.3<H>  /  Alb  3.7  /  TBili  1.0  /  DBili  x   /  AST  28  /  ALT  28  /  AlkPhos  120  12-16      PT/INR - ( 16 Dec 2022 09:03 )   PT: 14.6 sec;   INR: 1.22          PTT - ( 16 Dec 2022 09:03 )  PTT:29.4 sec    CARDIAC MARKERS ( 16 Dec 2022 09:03 )  x     / <0.01 ng/mL / 396 U/L / x     / <1.0 ng/mL        Urinalysis Basic - ( 16 Dec 2022 09:58 )    Color: Yellow / Appearance: Clear / SG: >=1.030 / pH: x  Gluc: x / Ketone: 15 mg/dL  / Bili: Small / Urobili: 1.0 E.U./dL   Blood: x / Protein: 100 mg/dL / Nitrite: NEGATIVE   Leuk Esterase: NEGATIVE / RBC: < 5 /HPF / WBC 5-10 /HPF   Sq Epi: x / Non Sq Epi: 0-5 /HPF / Bacteria: Present /HPF        EKG: LABS:             12.0   9.59  )-----------( 457      ( 16 Dec 2022 09:03 )             37.6       12-16    134<L>  |  98  |  11  ----------------------------<  162<H>  3.2<L>   |  21<L>  |  1.08    Ca    8.8      16 Dec 2022 09:03  Phos  3.1     12-16  Mg     1.8     12-16    TPro  9.3<H>  /  Alb  3.7  /  TBili  1.0  /  DBili  x   /  AST  28  /  ALT  28  /  AlkPhos  120  12-16      PT/INR - ( 16 Dec 2022 09:03 )   PT: 14.6 sec;   INR: 1.22          PTT - ( 16 Dec 2022 09:03 )  PTT:29.4 sec    CARDIAC MARKERS ( 16 Dec 2022 09:03 )  x     / <0.01 ng/mL / 396 U/L / x     / <1.0 ng/mL        Urinalysis Basic - ( 16 Dec 2022 09:58 )    Color: Yellow / Appearance: Clear / SG: >=1.030 / pH: x  Gluc: x / Ketone: 15 mg/dL  / Bili: Small / Urobili: 1.0 E.U./dL   Blood: x / Protein: 100 mg/dL / Nitrite: NEGATIVE   Leuk Esterase: NEGATIVE / RBC: < 5 /HPF / WBC 5-10 /HPF   Sq Epi: x / Non Sq Epi: 0-5 /HPF / Bacteria: Present /HPF      Initial EKG: SVT 179bpm, NSST changes  EKG s/p adenosine: sinus tachycardia 120bpm

## 2022-12-16 NOTE — CONSULT NOTE ADULT - SUBJECTIVE AND OBJECTIVE BOX
Orthopaedic Surgery Consult Note    Attending: Dr. Urbina    CC: Patient is a 52y old Male, LHD, transferred from [facility] this AM, who presents with a chief complaint of atraumatic left shoulder pain and swelling x approximately 1 week. Denies recent illness, bites, injuries, etc. Denies history of similar symptoms. Has been seen at OSH for treatment and was given oral and IM analgesics. Seen at [facility] last night and the shoulder was aspirated  Reports pain has worsened and he is unable to lift or use the arm without significant pain. Pain is interfering with sleep at night. Denies numbness, tingling in the UE. Denies fevers, headaches, etc. Denies pain in other joints.     HPI:  52y old Male    ROS: Positive for  Denies fevers, chills, headache, dizziness, chest pain, shortness of breath, nausea, vomiting.   All other systems negative, except for above.     Allergies    No Known Allergies    Intolerances      PAST MEDICAL & SURGICAL HISTORY:  No pertinent past medical history        Social History:    FAMILY HISTORY:    Medications:     Vital Signs Last 24 Hrs  T(C): 37.2 (16 Dec 2022 09:19), Max: 38.3 (16 Dec 2022 07:53)  T(F): 99 (16 Dec 2022 09:19), Max: 100.9 (16 Dec 2022 07:53)  HR: 107 (16 Dec 2022 10:17) (85 - 183)  BP: 153/83 (16 Dec 2022 10:17) (143/83 - 190/76)  BP(mean): --  RR: 16 (16 Dec 2022 10:17) (15 - 33)  SpO2: 98% (16 Dec 2022 10:17) (95% - 100%)    Parameters below as of 16 Dec 2022 10:17  Patient On (Oxygen Delivery Method): room air        PE:  General: Well developed, well nourished, in no acute distress, comfortable  Neuro: Alert, oriented x 3  Psych: Normal mood & affect   Skin: Warm, dry, extremities well perfused, no obvious rash  MSK:    -Inspection/palpation:    -Sensation:    -Motor:     -ROM:    -Pulses:                           12.0   9.59  )-----------( 457      ( 16 Dec 2022 09:03 )             37.6     12-16    134<L>  |  98  |  11  ----------------------------<  162<H>  3.2<L>   |  21<L>  |  1.08    Ca    8.8      16 Dec 2022 09:03  Phos  3.1     12-16  Mg     1.8     12-16    TPro  9.3<H>  /  Alb  3.7  /  TBili  1.0  /  DBili  x   /  AST  28  /  ALT  28  /  AlkPhos  120  12-16    PT/INR - ( 16 Dec 2022 09:03 )   PT: 14.6 sec;   INR: 1.22          PTT - ( 16 Dec 2022 09:03 )  PTT:29.4 sec    Imaging:     A/P: 52yMale      Reviewed imaging and lab data   Risks and benefits were discussed for   Above plan discussed with Dr. Dove Pager [phone]    ___ minutes spent on total encounter, over 50% of the visit was spent counseling and/or coordinating care.    Orthopaedic Surgery Consult Note    Attending: Dr. Urbina    CC: Patient is a 52y old Male, LHD, transferred from [facility] this AM, who presents with a chief complaint of atraumatic left shoulder pain and swelling x approximately 1 week. Denies recent illness, bites, injuries, etc. Denies history of similar symptoms. Has been seen at OSH for treatment and was given oral and IM analgesics. Seen at [facility] last night and the shoulder was aspirated  Reports pain has worsened and he is unable to lift or use the arm without significant pain. Pain is interfering with sleep at night. Denies numbness, tingling in the UE. Denies fevers, headaches, etc. Denies pain in other joints.     Patient was sent for transfer to [facility] from [facility] however went into SVT on arrival to [facility].     ROS: Positive for left shoulder pain.  Denies fevers, chills, headache, dizziness, chest pain, shortness of breath, nausea, vomiting.   All other systems negative, except for above.     Allergies    No Known Allergies    Intolerances      PAST MEDICAL & SURGICAL HISTORY:  No pertinent past medical history        Social History:    FAMILY HISTORY:    Medications:     Vital Signs Last 24 Hrs  T(C): 37.2 (16 Dec 2022 09:19), Max: 38.3 (16 Dec 2022 07:53)  T(F): 99 (16 Dec 2022 09:19), Max: 100.9 (16 Dec 2022 07:53)  HR: 107 (16 Dec 2022 10:17) (85 - 183)  BP: 153/83 (16 Dec 2022 10:17) (143/83 - 190/76)  BP(mean): --  RR: 16 (16 Dec 2022 10:17) (15 - 33)  SpO2: 98% (16 Dec 2022 10:17) (95% - 100%)    Parameters below as of 16 Dec 2022 10:17  Patient On (Oxygen Delivery Method): room air        PE:  General: Well developed, well nourished, in no acute distress, comfortable. On monitor  Neuro: Alert, oriented x 3  Psych: Normal mood & affect; answers questions appropriately  Skin: Warm, dry, extremities well perfused, no obvious rash  MSK:    -Inspection/palpation: Exam of the left shoulder shows bandaid over posterior shoulder. Moderate joint effusion. TTP throughout left shoulder    -Sensation: General sensation to light touch intact bilateral UE    -Motor: AIN/ulnar/median/radial intact bilat UE    -ROM: R shoulder AROM full and painless; L shoulder AROM and PROM limited by pain     -Pulses: RP3+    Cervical ROM intact, full and painless. No appreciable cervical lymphadenopathy.  Elbow and wrist ROM full and painless.                        12.0   9.59  )-----------( 457      ( 16 Dec 2022 09:03 )             37.6     12-16    134<L>  |  98  |  11  ----------------------------<  162<H>  3.2<L>   |  21<L>  |  1.08    Ca    8.8      16 Dec 2022 09:03  Phos  3.1     12-16  Mg     1.8     12-16    TPro  9.3<H>  /  Alb  3.7  /  TBili  1.0  /  DBili  x   /  AST  28  /  ALT  28  /  AlkPhos  120  12-16    PT/INR - ( 16 Dec 2022 09:03 )   PT: 14.6 sec;   INR: 1.22          PTT - ( 16 Dec 2022 09:03 )  PTT:29.4 sec    Imaging:   LEFT SHOULDER CT, 12/15/22  FINDINGS:  No acute displaced fracture. Acromioclavicular alignment is anatomic. Mild acromioclavicular arthrosis.  There is a nonspecific large glenohumeral joint effusion with inferior subluxation of the humeral head at the glenohumeral joint, likely related to the effusion. Minimal subchondral cystic changes along the posterosuperior glenoid.  There are foci of soft tissue gas within the lateral deltoid muscle, which may be related to a gas-forming/necrotizing infection or be related to recent injection.  Subcentimeter left axillary lymph nodes and subcentimeter in lymph nodes within the left lower neck.  Indeterminate hypoattenuating lesion within the right lobe of the thyroid, measuring up to 1.3 cm.  IMPRESSION:  Nonspecific large glenohumeral joint effusion. If there is clinical concern for septic arthritis, joint aspiration should be performed.  Foci of soft tissue gas within the lateral deltoid muscle, which may be related to a gas-forming/necrotizing infection or be related to recent injection. Clinical correlation recommended.  No acute displaced fracture. Inferior subluxation of the humeral head at the glenohumeral joint, likely related to the patient's large glenohumeral joint effusion.  Indeterminate hypoattenuating nodule within the right lobe of the thyroid. Recommend further evaluation with nonemergent thyroid ultrasound.  Labs:  Left shoulder cell count:  [id number] cell count, 95 neutorphils    A/P: 52yMale with left shoulder joint effusion concerning for left shoulder septic joint  - Patient requires medical and cardiac stabilization given SVT on arrival to [facility]  - Plan for OR, left shoulder washout, once medically stable        Reviewed imaging and lab data   Risks and benefits were discussed for   Above plan discussed with Dr. Dove Pager [phone]    ___ minutes spent on total encounter, over 50% of the visit was spent counseling and/or coordinating care.    Orthopaedic Surgery Consult Note    Attending: Dr. Urbina    CC: Patient is a 52y old Male, LHD, transferred from [facility] this AM, who presents with a chief complaint of atraumatic left shoulder pain and swelling x approximately 1 week. Denies recent illness, bites, injuries, etc. Denies history of similar symptoms. Has been seen at OSH for treatment and was given oral and IM analgesics. Seen at [facility] last night and the shoulder was aspirated  Reports pain has worsened and he is unable to lift or use the arm without significant pain. Pain is interfering with sleep at night. Denies numbness, tingling in the UE. Denies fevers, headaches, etc. Denies pain in other joints.     Patient was sent for transfer to [facility] from [facility] however went into SVT on arrival to [facility].     ROS: Positive for left shoulder pain.  Denies fevers, chills, headache, dizziness, chest pain, shortness of breath, nausea, vomiting.   All other systems negative, except for above.     Allergies    No Known Allergies    Intolerances      PAST MEDICAL & SURGICAL HISTORY:  No pertinent past medical history        Social History:    FAMILY HISTORY:    Medications:     Vital Signs Last 24 Hrs  T(C): 37.2 (16 Dec 2022 09:19), Max: 38.3 (16 Dec 2022 07:53)  T(F): 99 (16 Dec 2022 09:19), Max: 100.9 (16 Dec 2022 07:53)  HR: 107 (16 Dec 2022 10:17) (85 - 183)  BP: 153/83 (16 Dec 2022 10:17) (143/83 - 190/76)  BP(mean): --  RR: 16 (16 Dec 2022 10:17) (15 - 33)  SpO2: 98% (16 Dec 2022 10:17) (95% - 100%)    Parameters below as of 16 Dec 2022 10:17  Patient On (Oxygen Delivery Method): room air        PE:  General: Well developed, well nourished, in no acute distress, comfortable. On monitor  Neuro: Alert, oriented x 3  Psych: Normal mood & affect; answers questions appropriately  Skin: Warm, dry, extremities well perfused, no obvious rash  MSK:    -Inspection/palpation: Exam of the left shoulder shows bandaid over posterior shoulder. Moderate joint effusion. TTP throughout left shoulder    -Sensation: General sensation to light touch intact bilateral UE    -Motor: AIN/ulnar/median/radial intact bilat UE    -ROM: R shoulder AROM full and painless; L shoulder AROM and PROM limited by pain     -Pulses: RP3+    Cervical ROM intact, full and painless. No appreciable cervical lymphadenopathy.  Elbow and wrist ROM full and painless.                        12.0   9.59  )-----------( 457      ( 16 Dec 2022 09:03 )             37.6     12-16    134<L>  |  98  |  11  ----------------------------<  162<H>  3.2<L>   |  21<L>  |  1.08    Ca    8.8      16 Dec 2022 09:03  Phos  3.1     12-16  Mg     1.8     12-16    TPro  9.3<H>  /  Alb  3.7  /  TBili  1.0  /  DBili  x   /  AST  28  /  ALT  28  /  AlkPhos  120  12-16    PT/INR - ( 16 Dec 2022 09:03 )   PT: 14.6 sec;   INR: 1.22          PTT - ( 16 Dec 2022 09:03 )  PTT:29.4 sec    Imaging:   LEFT SHOULDER CT, 12/15/22  FINDINGS:  No acute displaced fracture. Acromioclavicular alignment is anatomic. Mild acromioclavicular arthrosis.  There is a nonspecific large glenohumeral joint effusion with inferior subluxation of the humeral head at the glenohumeral joint, likely related to the effusion. Minimal subchondral cystic changes along the posterosuperior glenoid.  There are foci of soft tissue gas within the lateral deltoid muscle, which may be related to a gas-forming/necrotizing infection or be related to recent injection.  Subcentimeter left axillary lymph nodes and subcentimeter in lymph nodes within the left lower neck.  Indeterminate hypoattenuating lesion within the right lobe of the thyroid, measuring up to 1.3 cm.  IMPRESSION:  Nonspecific large glenohumeral joint effusion. If there is clinical concern for septic arthritis, joint aspiration should be performed.  Foci of soft tissue gas within the lateral deltoid muscle, which may be related to a gas-forming/necrotizing infection or be related to recent injection. Clinical correlation recommended.  No acute displaced fracture. Inferior subluxation of the humeral head at the glenohumeral joint, likely related to the patient's large glenohumeral joint effusion.  Indeterminate hypoattenuating nodule within the right lobe of the thyroid. Recommend further evaluation with nonemergent thyroid ultrasound.  Labs:  Left shoulder cell count:  [id number] cell count, 95 neutorphils    A/P: 52yMale with left shoulder joint effusion, atraumatic, concerning for left shoulder septic joint. Aspiration performed 12/15/22.  - Patient requires medical and cardiac stabilization given SVT on arrival to [facility].  - ECHO currently pending completion  - Plan for OR, left shoulder washout, once medically stable.  - Will require medical clearance. NPO. Hold anticoagulation.       Ortho Pager [phone]   Orthopaedic Surgery Consult Note    Attending: Dr. Urbina    CC: Patient is a 52y old Male, LHD, transferred from [facility] this AM, who presents with a chief complaint of atraumatic left shoulder pain and swelling x approximately 1 week. Denies recent illness, bites, injuries, etc. Denies history of similar symptoms. Has been seen at OSH for treatment and was given oral and IM analgesics. Seen at [facility] last night and the shoulder was aspirated  Reports pain has worsened and he is unable to lift or use the arm without significant pain. Pain is interfering with sleep at night. Denies numbness, tingling in the UE. Denies fevers, headaches, etc. Denies pain in other joints.     Patient was sent for transfer to [facility] from [facility] however went into SVT on arrival to [facility].     ROS: Positive for left shoulder pain.  Denies fevers, chills, headache, dizziness, chest pain, shortness of breath, nausea, vomiting.   All other systems negative, except for above.     Allergies    No Known Allergies    Intolerances      PAST MEDICAL & SURGICAL HISTORY:  No pertinent past medical history        Social History:    FAMILY HISTORY:    Medications:     Vital Signs Last 24 Hrs  T(C): 37.2 (16 Dec 2022 09:19), Max: 38.3 (16 Dec 2022 07:53)  T(F): 99 (16 Dec 2022 09:19), Max: 100.9 (16 Dec 2022 07:53)  HR: 107 (16 Dec 2022 10:17) (85 - 183)  BP: 153/83 (16 Dec 2022 10:17) (143/83 - 190/76)  BP(mean): --  RR: 16 (16 Dec 2022 10:17) (15 - 33)  SpO2: 98% (16 Dec 2022 10:17) (95% - 100%)    Parameters below as of 16 Dec 2022 10:17  Patient On (Oxygen Delivery Method): room air        PE:  General: Well developed, well nourished, in no acute distress, comfortable. On monitor  Neuro: Alert, oriented x 3  Psych: Normal mood & affect; answers questions appropriately  Skin: Warm, dry, extremities well perfused, no obvious rash  MSK:    -Inspection/palpation: Exam of the left shoulder shows bandaid over posterior shoulder. Moderate joint effusion. TTP throughout left shoulder    -Sensation: General sensation to light touch intact bilateral UE    -Motor: AIN/ulnar/median/radial intact bilat UE    -ROM: R shoulder AROM full and painless; L shoulder AROM and PROM limited by pain     -Pulses: RP3+    Cervical ROM intact, full and painless. No appreciable cervical lymphadenopathy.  Elbow and wrist ROM full and painless.                        12.0   9.59  )-----------( 457      ( 16 Dec 2022 09:03 )             37.6     12-16    134<L>  |  98  |  11  ----------------------------<  162<H>  3.2<L>   |  21<L>  |  1.08    Ca    8.8      16 Dec 2022 09:03  Phos  3.1     12-16  Mg     1.8     12-16    TPro  9.3<H>  /  Alb  3.7  /  TBili  1.0  /  DBili  x   /  AST  28  /  ALT  28  /  AlkPhos  120  12-16    PT/INR - ( 16 Dec 2022 09:03 )   PT: 14.6 sec;   INR: 1.22          PTT - ( 16 Dec 2022 09:03 )  PTT:29.4 sec    Imaging:   LEFT SHOULDER CT, 12/15/22  FINDINGS:  No acute displaced fracture. Acromioclavicular alignment is anatomic. Mild acromioclavicular arthrosis.  There is a nonspecific large glenohumeral joint effusion with inferior subluxation of the humeral head at the glenohumeral joint, likely related to the effusion. Minimal subchondral cystic changes along the posterosuperior glenoid.  There are foci of soft tissue gas within the lateral deltoid muscle, which may be related to a gas-forming/necrotizing infection or be related to recent injection.  Subcentimeter left axillary lymph nodes and subcentimeter in lymph nodes within the left lower neck.  Indeterminate hypoattenuating lesion within the right lobe of the thyroid, measuring up to 1.3 cm.  IMPRESSION:  Nonspecific large glenohumeral joint effusion. If there is clinical concern for septic arthritis, joint aspiration should be performed.  Foci of soft tissue gas within the lateral deltoid muscle, which may be related to a gas-forming/necrotizing infection or be related to recent injection. Clinical correlation recommended.  No acute displaced fracture. Inferior subluxation of the humeral head at the glenohumeral joint, likely related to the patient's large glenohumeral joint effusion.  Indeterminate hypoattenuating nodule within the right lobe of the thyroid. Recommend further evaluation with nonemergent thyroid ultrasound.  Labs:  Left shoulder cell count:  [id number] cell count, 95 neutorphils    A/P: 52yMale with left shoulder joint effusion, atraumatic, concerning for left shoulder septic joint. Aspiration performed 12/15/22.  - Patient requires medical and cardiac stabilization given SVT on arrival to [facility].  - Continue Vanc/Zosyn  - ECHO currently pending completion  - Plan for OR, left shoulder washout, once medically stable.  - Will require medical clearance. NPO. Hold anticoagulation.       Ortho Pager [phone]

## 2022-12-16 NOTE — H&P ADULT - RESPIRATORY
clear to auscultation bilaterally/no wheezes/no rales/no rhonchi/no respiratory distress Expiration Date (Optional): 09/30/2024

## 2022-12-16 NOTE — H&P ADULT - ASSESSMENT
51 y/o male with obesity, no known significant PMHx (doesn't see doctors frequently), presented to [facility] ER 12/15 with atraumatic left shoulder pain and swelling x approximately 1 week, found to have septic L shoulder joint and was sent to [facility] ER for ortho admission, but in the ER was noted to be in SVT to 200s (resolved after adenosine 12mg IV x2 followed by Diltiazem 20mg IV x2), now sinus tachycardia. and admitted to cardiac tele for further management of SVT and eventual L shoulder washout with ortho.  53 y/o male with obesity, no known significant PMHx (doesn't see doctors frequently), presented to [facility] ER 12/15 with atraumatic left shoulder pain and swelling x approximately 1 week, found to have septic L shoulder joint and was sent to [facility] ER for ortho admission, but in the ER was noted to be in SVT to 200s (resolved after adenosine 12mg IV x2 followed by Diltiazem 20mg IV x2), and admitted to cardiac tele for further management of SVT and eventual L shoulder washout with ortho.

## 2022-12-16 NOTE — ED PROVIDER NOTE - CONSTITUTIONAL, MLM
normal... Well appearing, awake, alert, oriented to person, place, time/situation and in no apparent distress except during shoulder movement / palpation.

## 2022-12-16 NOTE — CONSULT NOTE ADULT - SUBJECTIVE AND OBJECTIVE BOX
Electrophysiology Consult Note:     CHIEF COMPLAINT:  Patient is a 52y old  Male who presents with a chief complaint of septic L shoulder (16 Dec 2022 12:24)        HISTORY OF PRESENT ILLNESS:   HPI:  CC: shoulder pain    53 y/o male with obesity, no known significant medical history presented to [facility] ER 12/15 with atraumatic left shoulder pain and swelling x approximately 1 week. Reports pain has worsened and he is unable to lift or use the arm without significant pain. He underwent joint aspiration at [facility] concerning for septic join and was sent to [facility] ER for ortho admission, but in the ER was noted to be in SVT to 200s, resolved after adenosine 12mg IV x2 followed by Diltiazem 20mg IV x2, now sinus tachycardia.   EPS called to evaluate.       PAST MEDICAL & SURGICAL HISTORY:  Obesity      No significant past surgical history          FAMILY HISTORY:  No pertinent family history in first degree relatives        SOCIAL HISTORY:    [x ] Non-smoker    Allergies    No Known Allergies    Intolerances    	    MEDICATIONS:  MEDICATIONS  (STANDING):  metoprolol succinate ER 50 milliGRAM(s) Oral every 24 hours  NIFEdipine XL 30 milliGRAM(s) Oral every 24 hours  piperacillin/tazobactam IVPB.. 3.375 Gram(s) IV Intermittent every 8 hours  vancomycin  IVPB 2000 milliGRAM(s) IV Intermittent every 12 hours    MEDICATIONS  (PRN):  acetaminophen     Tablet .. 650 milliGRAM(s) Oral every 6 hours PRN Temp greater or equal to 38C (100.4F), Mild Pain (1 - 3), Moderate Pain (4 - 6), Severe Pain (7 - 10)        REVIEW OF SYSTEMS:  CONSTITUTIONAL: No fever, weight loss, or fatigue  EYES: No eye pain, visual disturbances, or discharge  ENMT:  No difficulty hearing, tinnitus, vertigo; No sinus or throat pain  NECK: No pain or stiffness  BREASTS: No pain, masses, or nipple discharge  RESPIRATORY: No cough, wheezing, chills or hemoptysis; No Shortness of Breath  CARDIOVASCULAR: No chest pain, palpitations, dizziness, or leg swelling  GASTROINTESTINAL: No abdominal or epigastric pain. No nausea, vomiting, or hematemesis; No diarrhea or constipation.   GENITOURINARY: No dysuria, frequency, hematuria, or incontinence  NEUROLOGICAL: No headaches, memory loss, loss of strength, numbness, or tremors  SKIN: No itching, burning, rashes, or lesions       PHYSICAL EXAM:  Vital Signs Last 24 Hrs  T(C): 37.8 (16 Dec 2022 16:46), Max: 38.3 (16 Dec 2022 07:53)  T(F): 100 (16 Dec 2022 16:46), Max: 100.9 (16 Dec 2022 07:53)  HR: 102 (16 Dec 2022 16:46) (85 - 183)  BP: 168/97 (16 Dec 2022 16:46) (143/83 - 190/76)  BP(mean): 124 (16 Dec 2022 16:46) (124 - 124)  RR: 18 (16 Dec 2022 16:46) (16 - 33)  SpO2: 100% (16 Dec 2022 16:46) (95% - 100%)    Parameters below as of 16 Dec 2022 16:46  Patient On (Oxygen Delivery Method): room air      Daily Height in cm: 195.58 (16 Dec 2022 07:53)    Daily     Constitutional: NAD	  HEENT:  PERRL, EOMI	  CVS: S1 S2, No JVD, No edema  Pulm: Lungs clear to auscultation	  GI:  Soft, Non-tender, + BS	  Ext: No LE edema  Neurologic: A&O x 3  Skin: No rash or lesion       	  LABS:	                         12.0   9.59  )-----------( 457      ( 16 Dec 2022 09:03 )             37.6     12-16    134<L>  |  98  |  11  ----------------------------<  162<H>  3.2<L>   |  21<L>  |  1.08    Ca    8.8      16 Dec 2022 09:03  Phos  3.1     12-16  Mg     1.8     12-16    TPro  9.3<H>  /  Alb  3.7  /  TBili  1.0  /  DBili  x   /  AST  28  /  ALT  28  /  AlkPhos  120  12-16    proBNP:   Lipid Profile:   HgA1c:   TSH: Thyroid Stimulating Hormone, Serum: 1.620 uIU/mL (12-16 @ 09:03)  	      Telemetry:  sinus rhythm     Echo: < from: TTE Echo Complete w/ Contrast w/ Doppler (12.16.22 @ 15:22) >  1. Technically difficult study. Very limited exam-tech notes patient is   sitting up during imaging.   2. Mild symmetric left ventricular hypertrophy.   3. Normal LV systolic function.   4. The right ventricle is not well visualized.   5. Limited valvular evaluation.   6. Trivial pericardial effusion.

## 2022-12-16 NOTE — CHART NOTE - NSCHARTNOTEFT_GEN_A_CORE
Notified of low grade fever, T 37.8. Patient also noted to have T 38.3 in ER, no blood cultures obtained on arrival at [facility] (drawn at [facility]). Patient still feels well other then L shoulder pain with minimal movement. Non-toxic appearing, /97, -110s, SPO2 100% on RA. Blood cultures x2 sets (via R AC and L AC) obtained. Pt is on vanco/zosyn as per ortho recs. Tylenol PRN. NPO after midnight for OR washout tomorrow.

## 2022-12-16 NOTE — H&P ADULT - PROBLEM SELECTOR PLAN 2
-Tmax 100.9 in ER. No leukocytosis  -BCx sent 12/15  -abx per ortho  -plan for washout of L shoulder by ortho once okay from cardiac perspective

## 2022-12-16 NOTE — H&P ADULT - HISTORY OF PRESENT ILLNESS
INCOMPLETE    51 y/o male with no known significant PMHx, presented to [facility] ER 12/15 with atraumatic left shoulder pain and swelling x approximately 1 week. Reports pain has worsened and he is unable to lift or use the arm without significant pain. He underwent joint aspiration at [facility] concerning for septic join and was sent to [facility] ER for ortho admission, but in the ER was noted to be in SVT to 215bpm, resolved after adenosine 12mg IV x2 followed by Diltiazem 20mg IV x2, now sinus tachycardia.  CC: shoulder pain    51 y/o male with obesity, no known significant PMHx (doesn't see doctors frequently), presented to [facility] ER 12/15 with atraumatic left shoulder pain and swelling x approximately 1 week. Reports pain has worsened and he is unable to lift or use the arm without significant pain. He underwent joint aspiration at [facility] concerning for septic join and was sent to [facility] ER for ortho admission, but in the ER was noted to be in SVT to 200s, resolved after adenosine 12mg IV x2 followed by Diltiazem 20mg IV x2, now sinus tachycardia. He felt slightly dizzy with mild palpitations at the time. This has never happened before. Denies CP, SOB, orthopnea/PND, leg swelling, LOC, fever, chills.     On arrival at [facility] ER, patient HD stable, T 100.9, /98, , SPO2 95% on RA. Labs notable for troponin negative, no leukocytosis, Na 134, K 3.2, bicarb 21, otherwise unremarkable. CXR with cardiomegaly. Patient later found to be in -200sbpm. Received adenosine 12mg IV x2 followed by Diltiazem 20mg IV x2, now sinus tachycardia. Also received zosyn. Admitted to cardiac tele for further management of SVT and eventual L shoulder washout with ortho.

## 2022-12-16 NOTE — ED ADULT TRIAGE NOTE - NSSEPSISSUSPECTED_ED_A_ED
Problem: Safety  Goal: Will remain free from falls  Outcome: PROGRESSING AS EXPECTED  Patient's risk for injury and falls assessed. Appropriate safety precautions in place. Patient educated to utilize call light for needs. Patient verbalizes understanding.    Problem: Knowledge Deficit  Goal: Knowledge of disease process/condition, treatment plan, diagnostic tests, and medications will improve  Outcome: PROGRESSING AS EXPECTED  Patient is educated of disease process and condition. Treatment team has included patient in plan of care. All medications indications and side effects are explained. Patient is encouraged to ask questions. Patient indicates understanding.       Yes

## 2022-12-16 NOTE — H&P ADULT - NS ATTEND AMEND GEN_ALL_CORE FT
Initial attending contact date 12.16.22 on evening rounds. See attending addendum to HPI here for initial attending contact documentation.   See PA note written above, for details. I reviewed the PA documentation.  I have personally seen and examined this patient today. I reviewed vitals, labs, medications, cardiac studies and additional imaging.  I agree with the PA's findings and plans as written above with the following additions/amendments:  53 y/o male with obesity, no known significant PMHx presented to [facility] ER 12/15/22 with atraumatic left shoulder pain and swelling x approximately 1 week, found to have septic L shoulder joint and was sent to [facility] ER for ortho admission. In [facility] ED patient  noted to be in SVT to 200s (resolved after adenosine 12mg IV x2, Diltiazem 20mg IV x2), and admitted to cardiac tele for further management of SVT and orthopedic care.  Patient in NSR and periods of sinus tachycardia, temperature 100F upon arrival to floor, patient endorsing pain to L shoulder.  Patient also reporting he had recent L shoulder injections x 3 at OSH for mgmt of shoulder pain, with injections being given around L deltoid area per patient report.      Physical Exam notable for: middle aged male patient laying in bed appears uncomfortable and sweaty, flat neck veins, regular rhythm, tachycardic rate, no MGR detected, clear lungs, overly nourished abdomen, no fluid wave detected, L shoulder hot to touch, swollen, mild erythema, gauze and tegederm with no bloody strikethrough at L shoulder/deltoid area,  no pretibial pitting edema, no ankle edema, A&Ox4   TTE 12/16/22:  Normal LV function, mild LVH, limited valve evaluation, trivial pericardial effusion    Plan for:  Sepsis management, blood cultures sent, pain control, IVFs  Toprol 50XL daily for rate control  Nifedipine 30XL daily for HTN mgmt  NPO pMN for OR L shoulder washout  Patient low cardiac risk for orthopedic surgery with no cardiac contraindications to proceeding with surgery. Plan for transfer to [facility] service post op with cards consult to follow.  Gael Amaro M.D.  Cardiology Attending  55minutes spent on total encounter; more than 50% of the visit was spent counseling and/or coordinating care by the attending physician, with plan of care discussed with the patient, support person at bedside and cardiac team.

## 2022-12-16 NOTE — ED PROVIDER NOTE - CARE PLAN
1 Principal Discharge DX:	SVT (supraventricular tachycardia)  Secondary Diagnosis:	Septic arthritis of shoulder, left

## 2022-12-16 NOTE — ED PROVIDER NOTE - MUSCULOSKELETAL, MLM
Spine appears normal, range of motion is not limited, no muscle or joint tenderness, Exception: tender and edematous over L shoulder , no erythema, +warm, pain w passive ROM , distal pulse sensation / motor intact.

## 2022-12-16 NOTE — ED ADULT NURSE REASSESSMENT NOTE - NS ED NURSE REASSESS COMMENT FT1
documentation done after pt care. Pt endorsed to me from night shift PILAR Hameed. pt transferred from [facility] with left shoulder effusion, admitted to [facility], but during transferred EMS called as sepsis code, pt febrile and tachycardic. upon assessment, pt's , MD Herrera aware, pt placed in pacer pads, second line placed ( see flow sheets), ekg done and rhythm strip ongoing for monitor, medications given for HR control (see orders).  repeat blood word done and sent to lab, awaiting results. pt denies cp, sob. Will continue to monitor.

## 2022-12-16 NOTE — H&P ADULT - PROBLEM SELECTOR PLAN 1
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 Currently sinus tach 100s  -start Toprol XL 50mg daily  -EP consulted

## 2022-12-16 NOTE — ED PROVIDER NOTE - PROGRESS NOTE DETAILS
foci of air in deltoid on recent CT, pt had IM pain med at location at [facility] which likely explains this , WBC ok and pt non toxic and tender but tolerates palpation, doubt Nec fasciitis ,     pt w SVT to 215, resolved after adenosine x 2 followed by Diltiazem 20 x 2, cardiology admission requested by orthopedics,

## 2022-12-16 NOTE — ED ADULT TRIAGE NOTE - CHIEF COMPLAINT QUOTE
sent from [facility]. was supposed to be admitted in 8W and was diverted to the ED for abnormal vital signs. as per EMS, pt had 20mL of pus drained on L shoulder

## 2022-12-16 NOTE — CONSULT NOTE ADULT - ASSESSMENT
53 yo M with no significant medical history who presented to [facility] with left shoulder pain was admitted for septic  joint, need for joint washout as per ortho, was noted to be in SVT with HR up to 200 bpm , Patient was given atropine with termination of tachycardia. He is now in sinus tachycardia with HR  100 bpm, will rate control,  with BB, no EP contraindications for OR.   Patient should follow up for EPS and an ablation of SVT (AVNRT) after his joint infection cleared.

## 2022-12-16 NOTE — ED PROVIDER NOTE - OBJECTIVE STATEMENT
51 yo with 1 wk of worsening L shoulder pain, no trauma, no injections in shoulder, no fever, worsening pain and inability to raise arm without significant discomfort, associated swelling and muscle spasm, no numbness , no neck pain , no CP, no SOB,   [facility] + joint aspiration with + WBC's , concern for septic joint vs inflammatory   antibiotics given, lactic reassuring. sent to [facility] for ortho admission

## 2022-12-16 NOTE — H&P ADULT - PROBLEM SELECTOR PLAN 3
--160s/90s in ER. Likely has undiagnosed HTN  -start nifedipine XL 30mg daily and Toprol as above    #DVT PPx- holding iam-op    #Dispo- [facility] for now. Once optimized from cardiac perspective will go to OR for L shoulder washout and subsequently transfer to ortho service with Cardiology Consult to continue following    Case d/w Dr. Connelly --160s/90s in ER. Likely has undiagnosed HTN  -start nifedipine XL 30mg daily and Toprol as above    #DVT PPx- holding iam-op    #Dispo- [facility]. Once optimized from cardiac perspective will go to OR for L shoulder washout and subsequently transfer to ortho service with Cardiology Consult to continue following    Case d/w Dr. Connelly

## 2022-12-17 LAB
A1C WITH ESTIMATED AVERAGE GLUCOSE RESULT: 5.7 % — HIGH (ref 4–5.6)
ALBUMIN SERPL ELPH-MCNC: 3.4 G/DL — SIGNIFICANT CHANGE UP (ref 3.3–5)
ALP SERPL-CCNC: 111 U/L — SIGNIFICANT CHANGE UP (ref 40–120)
ALT FLD-CCNC: 29 U/L — SIGNIFICANT CHANGE UP (ref 10–45)
ANION GAP SERPL CALC-SCNC: 12 MMOL/L — SIGNIFICANT CHANGE UP (ref 5–17)
APTT BLD: 28.8 SEC — SIGNIFICANT CHANGE UP (ref 27.5–35.5)
AST SERPL-CCNC: 25 U/L — SIGNIFICANT CHANGE UP (ref 10–40)
BASOPHILS # BLD AUTO: 0.05 K/UL — SIGNIFICANT CHANGE UP (ref 0–0.2)
BASOPHILS NFR BLD AUTO: 0.5 % — SIGNIFICANT CHANGE UP (ref 0–2)
BILIRUB SERPL-MCNC: 1 MG/DL — SIGNIFICANT CHANGE UP (ref 0.2–1.2)
BUN SERPL-MCNC: 11 MG/DL — SIGNIFICANT CHANGE UP (ref 7–23)
CALCIUM SERPL-MCNC: 8.8 MG/DL — SIGNIFICANT CHANGE UP (ref 8.4–10.5)
CHLORIDE SERPL-SCNC: 100 MMOL/L — SIGNIFICANT CHANGE UP (ref 96–108)
CHOLEST SERPL-MCNC: 125 MG/DL — SIGNIFICANT CHANGE UP
CO2 SERPL-SCNC: 22 MMOL/L — SIGNIFICANT CHANGE UP (ref 22–31)
CREAT SERPL-MCNC: 0.9 MG/DL — SIGNIFICANT CHANGE UP (ref 0.5–1.3)
CULTURE RESULTS: NO GROWTH — SIGNIFICANT CHANGE UP
CULTURE RESULTS: NO GROWTH — SIGNIFICANT CHANGE UP
EGFR: 103 ML/MIN/1.73M2 — SIGNIFICANT CHANGE UP
EOSINOPHIL # BLD AUTO: 0.03 K/UL — SIGNIFICANT CHANGE UP (ref 0–0.5)
EOSINOPHIL NFR BLD AUTO: 0.3 % — SIGNIFICANT CHANGE UP (ref 0–6)
ESTIMATED AVERAGE GLUCOSE: 117 MG/DL — HIGH (ref 68–114)
GLUCOSE SERPL-MCNC: 117 MG/DL — HIGH (ref 70–99)
GRAM STN FLD: SIGNIFICANT CHANGE UP
HCT VFR BLD CALC: 34 % — LOW (ref 39–50)
HDLC SERPL-MCNC: 48 MG/DL — SIGNIFICANT CHANGE UP
HGB BLD-MCNC: 10.9 G/DL — LOW (ref 13–17)
IMM GRANULOCYTES NFR BLD AUTO: 0.2 % — SIGNIFICANT CHANGE UP (ref 0–0.9)
INR BLD: 1.29 — HIGH (ref 0.88–1.16)
LIPID PNL WITH DIRECT LDL SERPL: 69 MG/DL — SIGNIFICANT CHANGE UP
LYMPHOCYTES # BLD AUTO: 1.53 K/UL — SIGNIFICANT CHANGE UP (ref 1–3.3)
LYMPHOCYTES # BLD AUTO: 15.6 % — SIGNIFICANT CHANGE UP (ref 13–44)
MAGNESIUM SERPL-MCNC: 1.9 MG/DL — SIGNIFICANT CHANGE UP (ref 1.6–2.6)
MCHC RBC-ENTMCNC: 26.7 PG — LOW (ref 27–34)
MCHC RBC-ENTMCNC: 32.1 GM/DL — SIGNIFICANT CHANGE UP (ref 32–36)
MCV RBC AUTO: 83.3 FL — SIGNIFICANT CHANGE UP (ref 80–100)
MONOCYTES # BLD AUTO: 0.7 K/UL — SIGNIFICANT CHANGE UP (ref 0–0.9)
MONOCYTES NFR BLD AUTO: 7.1 % — SIGNIFICANT CHANGE UP (ref 2–14)
NEUTROPHILS # BLD AUTO: 7.47 K/UL — HIGH (ref 1.8–7.4)
NEUTROPHILS NFR BLD AUTO: 76.3 % — SIGNIFICANT CHANGE UP (ref 43–77)
NON HDL CHOLESTEROL: 77 MG/DL — SIGNIFICANT CHANGE UP
NRBC # BLD: 0 /100 WBCS — SIGNIFICANT CHANGE UP (ref 0–0)
PLATELET # BLD AUTO: 461 K/UL — HIGH (ref 150–400)
POTASSIUM SERPL-MCNC: 4.2 MMOL/L — SIGNIFICANT CHANGE UP (ref 3.5–5.3)
POTASSIUM SERPL-SCNC: 4.2 MMOL/L — SIGNIFICANT CHANGE UP (ref 3.5–5.3)
PROT SERPL-MCNC: 8.4 G/DL — HIGH (ref 6–8.3)
PROTHROM AB SERPL-ACNC: 15.4 SEC — HIGH (ref 10.5–13.4)
RBC # BLD: 4.08 M/UL — LOW (ref 4.2–5.8)
RBC # FLD: 15.5 % — HIGH (ref 10.3–14.5)
SODIUM SERPL-SCNC: 134 MMOL/L — LOW (ref 135–145)
SPECIMEN SOURCE: SIGNIFICANT CHANGE UP
TRIGL SERPL-MCNC: 39 MG/DL — SIGNIFICANT CHANGE UP
WBC # BLD: 9.8 K/UL — SIGNIFICANT CHANGE UP (ref 3.8–10.5)
WBC # FLD AUTO: 9.8 K/UL — SIGNIFICANT CHANGE UP (ref 3.8–10.5)

## 2022-12-17 PROCEDURE — 99222 1ST HOSP IP/OBS MODERATE 55: CPT

## 2022-12-17 PROCEDURE — 99233 SBSQ HOSP IP/OBS HIGH 50: CPT

## 2022-12-17 RX ORDER — OXYCODONE HYDROCHLORIDE 5 MG/1
5 TABLET ORAL EVERY 4 HOURS
Refills: 0 | Status: DISCONTINUED | OUTPATIENT
Start: 2022-12-17 | End: 2022-12-23

## 2022-12-17 RX ORDER — CEFTRIAXONE 500 MG/1
2000 INJECTION, POWDER, FOR SOLUTION INTRAMUSCULAR; INTRAVENOUS EVERY 24 HOURS
Refills: 0 | Status: DISCONTINUED | OUTPATIENT
Start: 2022-12-17 | End: 2022-12-19

## 2022-12-17 RX ORDER — HYDROMORPHONE HYDROCHLORIDE 2 MG/ML
1 INJECTION INTRAMUSCULAR; INTRAVENOUS; SUBCUTANEOUS ONCE
Refills: 0 | Status: DISCONTINUED | OUTPATIENT
Start: 2022-12-17 | End: 2022-12-17

## 2022-12-17 RX ORDER — LIDOCAINE 4 G/100G
1 CREAM TOPICAL ONCE
Refills: 0 | Status: COMPLETED | OUTPATIENT
Start: 2022-12-17 | End: 2022-12-17

## 2022-12-17 RX ORDER — SODIUM CHLORIDE 9 MG/ML
1000 INJECTION INTRAMUSCULAR; INTRAVENOUS; SUBCUTANEOUS
Refills: 0 | Status: DISCONTINUED | OUTPATIENT
Start: 2022-12-17 | End: 2022-12-26

## 2022-12-17 RX ORDER — OXYCODONE HYDROCHLORIDE 5 MG/1
10 TABLET ORAL EVERY 4 HOURS
Refills: 0 | Status: DISCONTINUED | OUTPATIENT
Start: 2022-12-17 | End: 2022-12-23

## 2022-12-17 RX ORDER — LANOLIN ALCOHOL/MO/W.PET/CERES
5 CREAM (GRAM) TOPICAL AT BEDTIME
Refills: 0 | Status: DISCONTINUED | OUTPATIENT
Start: 2022-12-17 | End: 2022-12-27

## 2022-12-17 RX ORDER — ACETAMINOPHEN 500 MG
975 TABLET ORAL EVERY 8 HOURS
Refills: 0 | Status: DISCONTINUED | OUTPATIENT
Start: 2022-12-18 | End: 2022-12-21

## 2022-12-17 RX ORDER — ONDANSETRON 8 MG/1
4 TABLET, FILM COATED ORAL ONCE
Refills: 0 | Status: DISCONTINUED | OUTPATIENT
Start: 2022-12-17 | End: 2022-12-27

## 2022-12-17 RX ORDER — BENZOCAINE AND MENTHOL 5; 1 G/100ML; G/100ML
1 LIQUID ORAL
Refills: 0 | Status: DISCONTINUED | OUTPATIENT
Start: 2022-12-17 | End: 2022-12-27

## 2022-12-17 RX ORDER — METOPROLOL TARTRATE 50 MG
2.5 TABLET ORAL ONCE
Refills: 0 | Status: COMPLETED | OUTPATIENT
Start: 2022-12-17 | End: 2022-12-17

## 2022-12-17 RX ORDER — VANCOMYCIN HCL 1 G
1750 VIAL (EA) INTRAVENOUS EVERY 12 HOURS
Refills: 0 | Status: DISCONTINUED | OUTPATIENT
Start: 2022-12-17 | End: 2022-12-18

## 2022-12-17 RX ORDER — ACETAMINOPHEN 500 MG
325 TABLET ORAL ONCE
Refills: 0 | Status: COMPLETED | OUTPATIENT
Start: 2022-12-17 | End: 2022-12-17

## 2022-12-17 RX ADMIN — HYDROMORPHONE HYDROCHLORIDE 1 MILLIGRAM(S): 2 INJECTION INTRAMUSCULAR; INTRAVENOUS; SUBCUTANEOUS at 17:12

## 2022-12-17 RX ADMIN — Medication 650 MILLIGRAM(S): at 05:42

## 2022-12-17 RX ADMIN — Medication 650 MILLIGRAM(S): at 19:20

## 2022-12-17 RX ADMIN — OXYCODONE HYDROCHLORIDE 10 MILLIGRAM(S): 5 TABLET ORAL at 17:11

## 2022-12-17 RX ADMIN — PIPERACILLIN AND TAZOBACTAM 25 GRAM(S): 4; .5 INJECTION, POWDER, LYOPHILIZED, FOR SOLUTION INTRAVENOUS at 05:43

## 2022-12-17 RX ADMIN — HYDROMORPHONE HYDROCHLORIDE 1 MILLIGRAM(S): 2 INJECTION INTRAMUSCULAR; INTRAVENOUS; SUBCUTANEOUS at 01:00

## 2022-12-17 RX ADMIN — HYDROMORPHONE HYDROCHLORIDE 1 MILLIGRAM(S): 2 INJECTION INTRAMUSCULAR; INTRAVENOUS; SUBCUTANEOUS at 11:16

## 2022-12-17 RX ADMIN — LIDOCAINE 1 PATCH: 4 CREAM TOPICAL at 07:45

## 2022-12-17 RX ADMIN — Medication 2.5 MILLIGRAM(S): at 10:53

## 2022-12-17 RX ADMIN — Medication 650 MILLIGRAM(S): at 19:08

## 2022-12-17 RX ADMIN — SODIUM CHLORIDE 160 MILLILITER(S): 9 INJECTION INTRAMUSCULAR; INTRAVENOUS; SUBCUTANEOUS at 16:00

## 2022-12-17 RX ADMIN — OXYCODONE HYDROCHLORIDE 10 MILLIGRAM(S): 5 TABLET ORAL at 18:00

## 2022-12-17 RX ADMIN — HYDROMORPHONE HYDROCHLORIDE 1 MILLIGRAM(S): 2 INJECTION INTRAMUSCULAR; INTRAVENOUS; SUBCUTANEOUS at 00:13

## 2022-12-17 RX ADMIN — HYDROMORPHONE HYDROCHLORIDE 1 MILLIGRAM(S): 2 INJECTION INTRAMUSCULAR; INTRAVENOUS; SUBCUTANEOUS at 10:55

## 2022-12-17 RX ADMIN — Medication 650 MILLIGRAM(S): at 06:45

## 2022-12-17 RX ADMIN — LIDOCAINE 1 PATCH: 4 CREAM TOPICAL at 07:13

## 2022-12-17 RX ADMIN — Medication 250 MILLIGRAM(S): at 17:52

## 2022-12-17 RX ADMIN — HYDROMORPHONE HYDROCHLORIDE 1 MILLIGRAM(S): 2 INJECTION INTRAMUSCULAR; INTRAVENOUS; SUBCUTANEOUS at 04:27

## 2022-12-17 RX ADMIN — HYDROMORPHONE HYDROCHLORIDE 1 MILLIGRAM(S): 2 INJECTION INTRAMUSCULAR; INTRAVENOUS; SUBCUTANEOUS at 00:30

## 2022-12-17 RX ADMIN — HYDROMORPHONE HYDROCHLORIDE 1 MILLIGRAM(S): 2 INJECTION INTRAMUSCULAR; INTRAVENOUS; SUBCUTANEOUS at 03:57

## 2022-12-17 RX ADMIN — LIDOCAINE 1 PATCH: 4 CREAM TOPICAL at 00:30

## 2022-12-17 RX ADMIN — Medication 325 MILLIGRAM(S): at 20:08

## 2022-12-17 RX ADMIN — OXYCODONE HYDROCHLORIDE 10 MILLIGRAM(S): 5 TABLET ORAL at 21:48

## 2022-12-17 RX ADMIN — HYDROMORPHONE HYDROCHLORIDE 1 MILLIGRAM(S): 2 INJECTION INTRAMUSCULAR; INTRAVENOUS; SUBCUTANEOUS at 15:20

## 2022-12-17 RX ADMIN — Medication 50 MILLIGRAM(S): at 13:17

## 2022-12-17 RX ADMIN — Medication 30 MILLIGRAM(S): at 13:16

## 2022-12-17 RX ADMIN — CEFTRIAXONE 100 MILLIGRAM(S): 500 INJECTION, POWDER, FOR SOLUTION INTRAMUSCULAR; INTRAVENOUS at 12:48

## 2022-12-17 RX ADMIN — OXYCODONE HYDROCHLORIDE 10 MILLIGRAM(S): 5 TABLET ORAL at 21:16

## 2022-12-17 NOTE — CONSULT NOTE ADULT - SUBJECTIVE AND OBJECTIVE BOX
INFECTIOUS DISEASES INITIAL CONSULT NOTE    HPI: 52M h/o obesity p/w L shoulder pain and swelling x 1 week.  About two weeks PTA, he got flu shot on L arm.  About 1 week PTA, he noticed L shoulder started to become painful and swollen, which got worse over time.  He doesn't recall any trauma to L shoulder.  Due to L shoulder pain/swelling, he was unable to use his L arm so he came to ED.  Denied fever/chills, n/v/d, abdominal pain, CP, SOB.  Upon admission, he was febrile to 100.9, VSS, tachy to 110s.  Lab notable for normal WBC.  CXR cardiomegary. He was found to be in SVT s/p adenosine and diltiazem.  Seen by ortho, L shoulder aspirated, cell count 00620 (86% poly), c/w septic arthritis. He was taken to the OR today for washout - lots of pus was seen.  OR culture sent. ID was consulted for abx rec.  He is currently on vanc/zosyn.       PAST MEDICAL & SURGICAL HISTORY:  Obesity      No significant past surgical history            Review of Systems:   Constitutional, eyes, ENT, cardiovascular, respiratory, gastrointestinal, genitourinary, integumentary, neurological, psychiatric and heme/lymph are otherwise negative other than noted above       ANTIBIOTICS:  MEDICATIONS  (STANDING):  cefTRIAXone   IVPB 2000 milliGRAM(s) IV Intermittent every 24 hours  influenza   Vaccine 0.5 milliLiter(s) IntraMuscular once  metoprolol succinate ER 50 milliGRAM(s) Oral every 24 hours  NIFEdipine XL 30 milliGRAM(s) Oral every 24 hours  ondansetron Injectable 4 milliGRAM(s) IV Push once  sodium chloride 0.9%. 1000 milliLiter(s) (160 mL/Hr) IV Continuous <Continuous>  vancomycin  IVPB 1750 milliGRAM(s) IV Intermittent every 12 hours    MEDICATIONS  (PRN):  benzocaine 15 mG/menthol 3.6 mG Lozenge 1 Lozenge Oral four times a day PRN Sore Throat  HYDROmorphone  Injectable 1 milliGRAM(s) IV Push every 4 hours PRN Severe Pain (7 - 10)  melatonin 5 milliGRAM(s) Oral at bedtime PRN Sleep  oxyCODONE    IR 5 milliGRAM(s) Oral every 4 hours PRN Moderate Pain (4 - 6)  oxyCODONE    IR 10 milliGRAM(s) Oral every 4 hours PRN Severe Pain (7 - 10)      Allergies    No Known Allergies    Intolerances        SOCIAL HISTORY:  Born and grew up in [place].  Moved to the  in 2007.  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  Lives alone.  no toxic habits.     FAMILY HISTORY:  No pertinent family history in first degree relatives     no FH leading to current infection    Vital Signs Last 24 Hrs  T(C): 37.6 (17 Dec 2022 20:40), Max: 38.9 (17 Dec 2022 19:30)  T(F): 99.6 (17 Dec 2022 20:40), Max: 102 (17 Dec 2022 19:30)  HR: 110 (17 Dec 2022 20:40) (93 - 115)  BP: 137/70 (17 Dec 2022 20:40) (137/70 - 168/97)  BP(mean): 107 (17 Dec 2022 19:00) (102 - 126)  RR: 18 (17 Dec 2022 20:40) (14 - 20)  SpO2: 99% (17 Dec 2022 20:40) (97% - 100%)    Parameters below as of 17 Dec 2022 20:40  Patient On (Oxygen Delivery Method): room air        12-16-22 @ 07:01  -  12-17-22 @ 07:00  --------------------------------------------------------  IN: 120 mL / OUT: 500 mL / NET: -380 mL    12-17-22 @ 07:01  -  12-17-22 @ 21:13  --------------------------------------------------------  IN: 2350 mL / OUT: 1300 mL / NET: 1050 mL        PHYSICAL EXAM:  Constitutional: alert, NAD  Eyes: the sclera and conjunctiva were normal.   ENT: the ears and nose were normal in appearance.   Neck: the appearance of the neck was normal and the neck was supple.   Pulmonary: no respiratory distress and lungs were clear to auscultation bilaterally.   Heart: heart rate was normal and rhythm regular, normal S1 and S2  Vascular:. there was no peripheral edema  Abdomen: normal bowel sounds, soft, non-tender  Neurological: no focal deficits.   Ext: L shoulder covered with dressing       LABS:                        10.9   9.80  )-----------( 461      ( 17 Dec 2022 08:14 )             34.0     12-17    134<L>  |  100  |  11  ----------------------------<  117<H>  4.2   |  22  |  0.90    Ca    8.8      17 Dec 2022 08:14  Phos  3.1     12-16  Mg     1.9     12-17    TPro  8.4<H>  /  Alb  3.4  /  TBili  1.0  /  DBili  x   /  AST  25  /  ALT  29  /  AlkPhos  111  12-17    PT/INR - ( 17 Dec 2022 08:14 )   PT: 15.4 sec;   INR: 1.29          PTT - ( 17 Dec 2022 08:14 )  PTT:28.8 sec  Urinalysis Basic - ( 16 Dec 2022 09:58 )    Color: Yellow / Appearance: Clear / SG: >=1.030 / pH: x  Gluc: x / Ketone: 15 mg/dL  / Bili: Small / Urobili: 1.0 E.U./dL   Blood: x / Protein: 100 mg/dL / Nitrite: NEGATIVE   Leuk Esterase: NEGATIVE / RBC: < 5 /HPF / WBC 5-10 /HPF   Sq Epi: x / Non Sq Epi: 0-5 /HPF / Bacteria: Present /HPF        MICROBIOLOGY:  12/17 L shoulder abscess 1: p  12/17 L shoulder abscess 2: p  12/17 L shoulder abscess 3: p  12/17 BCx ngtd x 2  12/17 BCx ngtd x 2      RADIOLOGY & ADDITIONAL STUDIES:  CT shoulder L 12/15  IMPRESSION:    Nonspecific large glenohumeral joint effusion. If there is clinical   concern for septic arthritis, joint aspiration should be performed.    Foci of soft tissue gas within the lateral deltoid muscle, which may be   related to a gas-forming/necrotizing infection or be related to recent   injection. Clinical correlation recommended.    No acute displaced fracture. Inferior subluxation of the humeral head at   the glenohumeral joint, likely related to the patient's large   glenohumeral joint effusion.    Indeterminate hypoattenuating nodule within the right lobe of the   thyroid. Recommend further evaluation with nonemergent thyroid ultrasound.

## 2022-12-17 NOTE — CONSULT NOTE ADULT - ASSESSMENT
52M h/o obesity p/w L shoulder pain and swelling x 1 week, found to have L shoulder septic arthritis.  Now s/p washout on 12/17.  No trauma, no significant PMH, most likely organism is staph or strep spp (especially staph aureus).    - change vanco to 1750mg IV q12h, check trough before 4th dose (goal 13-18)  - stop zosyn  - start CTX 2g IV q24h  - f/u OR culture  - f/u BCx  - anticipate 4 weeks of abx therapy    Team 2 will follow you.  Case d/w primary team.    Digna Rojas MD, MS  Infectious Disease attending  work cell [phone]   For any questions during evening/weekend/holiday, please page ID on call

## 2022-12-17 NOTE — BRIEF OPERATIVE NOTE - OPERATION/FINDINGS
see dictation for details  pus expressed in glenohumeral joint through portal sites  3x cultures obtained  7L fluid for irrigation, no complications

## 2022-12-17 NOTE — PROGRESS NOTE ADULT - SUBJECTIVE AND OBJECTIVE BOX
Ortho Post Op Check    Procedure: L shoulder arthroscopic washout  Surgeon: Sulma    Pt comfortable without complaints, pain controlled  Denies CP, SOB, N/V, numbness/tingling     Vital Signs Last 24 Hrs  T(C): 36.1 (12-17-22 @ 09:56), Max: 38.4 (12-17-22 @ 06:52)  T(F): 97 (12-17-22 @ 09:56), Max: 101.1 (12-17-22 @ 06:52)  HR: 97 (12-17-22 @ 11:11) (93 - 115)  BP: 154/95 (12-17-22 @ 11:11) (148/97 - 158/100)  BP(mean): 115 (12-17-22 @ 11:11) (115 - 122)  RR: 17 (12-17-22 @ 11:11) (14 - 20)  SpO2: 100% (12-17-22 @ 11:11) (97% - 100%)  I&O's Summary    16 Dec 2022 07:01  -  17 Dec 2022 07:00  --------------------------------------------------------  IN: 120 mL / OUT: 500 mL / NET: -380 mL        Physical Exam:  General: Pt Alert and oriented, NAD  L shoulder DSG C/D/I  Pulses: 2+ radial pulses, wwp, cap refill <3 sec  Sensation: SILT in axillary/radial/median/ulnar distributions  Motor: axillary/AIN/PIN/ulnar firing                          10.9   9.80  )-----------( 461      ( 17 Dec 2022 08:14 )             34.0     12-17    134<L>  |  100  |  11  ----------------------------<  117<H>  4.2   |  22  |  0.90    Ca    8.8      17 Dec 2022 08:14  Phos  3.1     12-16  Mg     1.9     12-17    TPro  8.4<H>  /  Alb  3.4  /  TBili  1.0  /  DBili  x   /  AST  25  /  ALT  29  /  AlkPhos  111  12-17        A/P: 52yMale POD#0 s/p L shoulder arthroscopic washout  - Stable  - Pain Control  - f/u AM labs  - DVT ppx: SCDs  - Post op abx: vanco/zosyn  - ID consult  - PT, WBS: WBAT  - Dispo: pending ID recs, PT    Rj Dunbar, PGY-2  Ortho Pager [phone]

## 2022-12-17 NOTE — CHART NOTE - NSCHARTNOTEFT_GEN_A_CORE
Patient left unit for OR L shoulder washout prior to day PA evaluation. Pt cleared by cardiology  attending Dr Connelly and electrophysiology (EP) Dr Puente for transfer to orthopedic service post op. Recommended to continue metoprolol succinate 25 mg daily and follow up with EP Dr Puente for EPS or ablation of AVNRT after resolution of joint infection.  Cardiac consult team will continue to follow patient. Hospital Course:     51 y/o male with obesity, no known significant PMHx (doesn't see doctors frequently), presented to [facility] ER 12/15 with atraumatic left shoulder pain and swelling x approximately 1 week, found to have septic L shoulder joint and was sent to [facility] ER for ortho admission, but in the ER was noted to be in SVT to 200s (resolved after adenosine 12mg IV x2 followed by Diltiazem 20mg IV x2), and admitted to cardiac tele for further management of SVT and ortho consulted for L shoulder washout. Patient start Toprol XL 50mg daily, ECHO 12/16/22: EF 60-65%, No significant valvular disease. Cleared by Dr Connelly for OR. EP consulted and cleared for OR. Pt should follow up w/ Dr Puente for EPS and an ablation of SVT (AVNRT) after his joint infection cleared.     Assessment:   Patient left unit for OR L shoulder washout prior to day PA evaluation. Pt cleared by cardiology attending Dr Connelly and electrophysiology (EP) Dr Puente for transfer to orthopedic service post op.     Problem/Plan - 1:  ·  Problem: SVT (supraventricular tachycardia).   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 Currently sinus tach 100s  -TTE: EF 60-65%, No significant valvular disease  -started on Toprol XL 50mg daily  -EP consulted- should follow up for EPS and an ablation of SVT (AVNRT) after his joint infection cleared     Problem/Plan - 2:  ·  Problem: Septic arthritis of shoulder.   ·  Plan: -Tmax 100.9 in ER. No leukocytosis  -BCx sent 12/15 (from [facility]) and 12/16 PM  -abx per ortho  -plan for washout of L shoulder by ortho     Problem/Plan - 3:  ·  Problem: Hypertension.   ·  Plan: --160s/90s in ER. Likely has undiagnosed HTN  -started on nifedipine XL 30mg daily and Toprol as above    #DVT PPx- holding iam-op    Dispo: transfer to ortho service post op, cards consult team to follow.

## 2022-12-17 NOTE — PRE-OP CHECKLIST - ALLERGY BAND ON
[facility] Hospitalist Group  Progress Note    Patient: Tad Bernardo Age: 66 y.o. : 1938 MR#: [id number] SSN: [id number]  Date/Time: 3/6/2017 12:37 PM    Subjective/24-hour events:     Calm currently. HRs have been elevated, but BPs and other vitals stable. Assessment:   Pneumonia  Complex R parapneumonic effusion  Sepsis with septic shock secondary to above  SUJATHA  Hypernatremia  Hypomagnesemia and hypokalemia  Paroxsymal atrial fibrillation  PAD  Moderate-severe oropharyngeal dysphagia  Severe protein-calorie malnutrition  Seizure disorder    Plan:  F/U labs today - on hypotonic fluids since yesterday secondary to increasing sodium. Would like to wean physical restraints, if possible. TF as ordered. Discontinue lactulose. Would benefit from hospice/comfort care consideration. D/W palliative medicine today. Case discussed with:  []Patient  []Family  [x]Nursing  []Case Management  DVT Prophylaxis:  []Lovenox  []Hep SQ  [x]SCDs  []Coumadin   []On Heparin gtt    Objective:   VS:   Visit Vitals    /74 (BP 1 Location: Left arm, BP Patient Position: At rest)    Pulse (!) 115    Temp 99.1 °F (37.3 °C)    Resp 24    Ht 5' 9\" (1.753 m)    Wt 68.7 kg (151 lb 7.3 oz)    SpO2 95%    BMI 22.37 kg/m2      Tmax/24hrs: Temp (24hrs), Av.8 °F (37.1 °C), Min:98.1 °F (36.7 °C), Max:99.1 °F (37.3 °C)      Intake/Output Summary (Last 24 hours) at 17 1252  Last data filed at 17 3473   Gross per 24 hour   Intake              300 ml   Output              850 ml   Net             -550 ml       General:  In NAD. Cardiovascular:  RRR. Pulmonary:  No wheezes, effort nonlabored. GI:  Abdomen soft. Extremities:  Warm, no ischemia. Neuro:  Awake, but oriented to person only. Moves extremities spontaneously.     Labs:    Recent Results (from the past 24 hour(s))   GLUCOSE, POC    Collection Time: 17  5:15 PM   Result Value Ref Range    Glucose (POC) 203 (H) 70 - 110 mg/dL   GLUCOSE, POC    Collection Time: 03/05/17 11:20 PM   Result Value Ref Range    Glucose (POC) 188 (H) 70 - 110 mg/dL   GLUCOSE, POC    Collection Time: 03/06/17  6:07 AM   Result Value Ref Range    Glucose (POC) 179 (H) 70 - 110 mg/dL   GLUCOSE, POC    Collection Time: 03/06/17 11:03 AM   Result Value Ref Range    Glucose (POC) 228 (H) 70 - 110 mg/dL       Signed By: Jorge Portillo MD     March 6, 2017 12:52 PM no known allergies

## 2022-12-18 LAB
ANION GAP SERPL CALC-SCNC: 10 MMOL/L — SIGNIFICANT CHANGE UP (ref 5–17)
ANION GAP SERPL CALC-SCNC: 11 MMOL/L — SIGNIFICANT CHANGE UP (ref 5–17)
BUN SERPL-MCNC: 12 MG/DL — SIGNIFICANT CHANGE UP (ref 7–23)
BUN SERPL-MCNC: 14 MG/DL — SIGNIFICANT CHANGE UP (ref 7–23)
CALCIUM SERPL-MCNC: 8.8 MG/DL — SIGNIFICANT CHANGE UP (ref 8.4–10.5)
CALCIUM SERPL-MCNC: 8.9 MG/DL — SIGNIFICANT CHANGE UP (ref 8.4–10.5)
CHLORIDE SERPL-SCNC: 100 MMOL/L — SIGNIFICANT CHANGE UP (ref 96–108)
CHLORIDE SERPL-SCNC: 99 MMOL/L — SIGNIFICANT CHANGE UP (ref 96–108)
CO2 SERPL-SCNC: 24 MMOL/L — SIGNIFICANT CHANGE UP (ref 22–31)
CO2 SERPL-SCNC: 25 MMOL/L — SIGNIFICANT CHANGE UP (ref 22–31)
CREAT SERPL-MCNC: 1 MG/DL — SIGNIFICANT CHANGE UP (ref 0.5–1.3)
CREAT SERPL-MCNC: 1.01 MG/DL — SIGNIFICANT CHANGE UP (ref 0.5–1.3)
EGFR: 89 ML/MIN/1.73M2 — SIGNIFICANT CHANGE UP
EGFR: 91 ML/MIN/1.73M2 — SIGNIFICANT CHANGE UP
GLUCOSE SERPL-MCNC: 134 MG/DL — HIGH (ref 70–99)
GLUCOSE SERPL-MCNC: 146 MG/DL — HIGH (ref 70–99)
HCT VFR BLD CALC: 33.1 % — LOW (ref 39–50)
HCT VFR BLD CALC: 33.8 % — LOW (ref 39–50)
HGB BLD-MCNC: 10.9 G/DL — LOW (ref 13–17)
HGB BLD-MCNC: 10.9 G/DL — LOW (ref 13–17)
LACTATE SERPL-SCNC: 0.9 MMOL/L — SIGNIFICANT CHANGE UP (ref 0.5–2)
MCHC RBC-ENTMCNC: 26.9 PG — LOW (ref 27–34)
MCHC RBC-ENTMCNC: 27.3 PG — SIGNIFICANT CHANGE UP (ref 27–34)
MCHC RBC-ENTMCNC: 32.2 GM/DL — SIGNIFICANT CHANGE UP (ref 32–36)
MCHC RBC-ENTMCNC: 32.9 GM/DL — SIGNIFICANT CHANGE UP (ref 32–36)
MCV RBC AUTO: 83 FL — SIGNIFICANT CHANGE UP (ref 80–100)
MCV RBC AUTO: 83.5 FL — SIGNIFICANT CHANGE UP (ref 80–100)
NIGHT BLUE STAIN TISS: SIGNIFICANT CHANGE UP
NRBC # BLD: 0 /100 WBCS — SIGNIFICANT CHANGE UP (ref 0–0)
NRBC # BLD: 0 /100 WBCS — SIGNIFICANT CHANGE UP (ref 0–0)
PLATELET # BLD AUTO: 457 K/UL — HIGH (ref 150–400)
PLATELET # BLD AUTO: 487 K/UL — HIGH (ref 150–400)
POTASSIUM SERPL-MCNC: 4 MMOL/L — SIGNIFICANT CHANGE UP (ref 3.5–5.3)
POTASSIUM SERPL-MCNC: 4.1 MMOL/L — SIGNIFICANT CHANGE UP (ref 3.5–5.3)
POTASSIUM SERPL-SCNC: 4 MMOL/L — SIGNIFICANT CHANGE UP (ref 3.5–5.3)
POTASSIUM SERPL-SCNC: 4.1 MMOL/L — SIGNIFICANT CHANGE UP (ref 3.5–5.3)
RBC # BLD: 3.99 M/UL — LOW (ref 4.2–5.8)
RBC # BLD: 4.05 M/UL — LOW (ref 4.2–5.8)
RBC # FLD: 15.3 % — HIGH (ref 10.3–14.5)
RBC # FLD: 15.4 % — HIGH (ref 10.3–14.5)
SODIUM SERPL-SCNC: 134 MMOL/L — LOW (ref 135–145)
SODIUM SERPL-SCNC: 135 MMOL/L — SIGNIFICANT CHANGE UP (ref 135–145)
SPECIMEN SOURCE: SIGNIFICANT CHANGE UP
VANCOMYCIN TROUGH SERPL-MCNC: 5.7 UG/ML — LOW (ref 10–20)
WBC # BLD: 10.34 K/UL — SIGNIFICANT CHANGE UP (ref 3.8–10.5)
WBC # BLD: 9.06 K/UL — SIGNIFICANT CHANGE UP (ref 3.8–10.5)
WBC # FLD AUTO: 10.34 K/UL — SIGNIFICANT CHANGE UP (ref 3.8–10.5)
WBC # FLD AUTO: 9.06 K/UL — SIGNIFICANT CHANGE UP (ref 3.8–10.5)

## 2022-12-18 PROCEDURE — 76937 US GUIDE VASCULAR ACCESS: CPT | Mod: 26

## 2022-12-18 PROCEDURE — 99232 SBSQ HOSP IP/OBS MODERATE 35: CPT

## 2022-12-18 PROCEDURE — 71045 X-RAY EXAM CHEST 1 VIEW: CPT | Mod: 26

## 2022-12-18 PROCEDURE — 36000 PLACE NEEDLE IN VEIN: CPT

## 2022-12-18 RX ORDER — DAPTOMYCIN 500 MG/10ML
900 INJECTION, POWDER, LYOPHILIZED, FOR SOLUTION INTRAVENOUS EVERY 24 HOURS
Refills: 0 | Status: DISCONTINUED | OUTPATIENT
Start: 2022-12-18 | End: 2022-12-22

## 2022-12-18 RX ORDER — VANCOMYCIN HCL 1 G
2000 VIAL (EA) INTRAVENOUS EVERY 12 HOURS
Refills: 0 | Status: DISCONTINUED | OUTPATIENT
Start: 2022-12-18 | End: 2022-12-18

## 2022-12-18 RX ORDER — DAPTOMYCIN 500 MG/10ML
900 INJECTION, POWDER, LYOPHILIZED, FOR SOLUTION INTRAVENOUS EVERY 24 HOURS
Refills: 0 | Status: DISCONTINUED | OUTPATIENT
Start: 2022-12-18 | End: 2022-12-18

## 2022-12-18 RX ADMIN — DAPTOMYCIN 136 MILLIGRAM(S): 500 INJECTION, POWDER, LYOPHILIZED, FOR SOLUTION INTRAVENOUS at 23:36

## 2022-12-18 RX ADMIN — Medication 975 MILLIGRAM(S): at 03:06

## 2022-12-18 RX ADMIN — OXYCODONE HYDROCHLORIDE 10 MILLIGRAM(S): 5 TABLET ORAL at 13:15

## 2022-12-18 RX ADMIN — OXYCODONE HYDROCHLORIDE 10 MILLIGRAM(S): 5 TABLET ORAL at 06:38

## 2022-12-18 RX ADMIN — OXYCODONE HYDROCHLORIDE 10 MILLIGRAM(S): 5 TABLET ORAL at 21:50

## 2022-12-18 RX ADMIN — HYDROMORPHONE HYDROCHLORIDE 1 MILLIGRAM(S): 2 INJECTION INTRAMUSCULAR; INTRAVENOUS; SUBCUTANEOUS at 00:05

## 2022-12-18 RX ADMIN — OXYCODONE HYDROCHLORIDE 10 MILLIGRAM(S): 5 TABLET ORAL at 05:49

## 2022-12-18 RX ADMIN — CEFTRIAXONE 100 MILLIGRAM(S): 500 INJECTION, POWDER, FOR SOLUTION INTRAMUSCULAR; INTRAVENOUS at 12:30

## 2022-12-18 RX ADMIN — Medication 30 MILLIGRAM(S): at 12:31

## 2022-12-18 RX ADMIN — Medication 975 MILLIGRAM(S): at 12:30

## 2022-12-18 RX ADMIN — HYDROMORPHONE HYDROCHLORIDE 1 MILLIGRAM(S): 2 INJECTION INTRAMUSCULAR; INTRAVENOUS; SUBCUTANEOUS at 00:25

## 2022-12-18 RX ADMIN — OXYCODONE HYDROCHLORIDE 10 MILLIGRAM(S): 5 TABLET ORAL at 12:30

## 2022-12-18 RX ADMIN — Medication 975 MILLIGRAM(S): at 21:50

## 2022-12-18 RX ADMIN — OXYCODONE HYDROCHLORIDE 10 MILLIGRAM(S): 5 TABLET ORAL at 22:30

## 2022-12-18 RX ADMIN — Medication 50 MILLIGRAM(S): at 12:31

## 2022-12-18 RX ADMIN — Medication 975 MILLIGRAM(S): at 02:36

## 2022-12-18 NOTE — PHYSICAL THERAPY INITIAL EVALUATION ADULT - RANGE OF MOTION EXAMINATION, REHAB EVAL
LUE ROM limited by post-op pain/Right UE ROM was WNL (within normal limits)/bilateral lower extremity was ROM was WNL (within normal limits)

## 2022-12-18 NOTE — PROCEDURE NOTE - NSICDXPROCEDURE_GEN_ALL_CORE_FT
PROCEDURES:  Insertion of intravenous catheter with ultrasound guidance 18-Dec-2022 22:48:33  Meme Locke  Blood draw 18-Dec-2022 22:48:40  Meme Locke   PROCEDURES:  Insertion of intravenous catheter with ultrasound guidance 18-Dec-2022 22:48:33  Meme Locke  Blood draw 18-Dec-2022 22:48:40  Meme Locke  Blood culture 18-Dec-2022 23:24:29  Meme Locke

## 2022-12-18 NOTE — PHYSICAL THERAPY INITIAL EVALUATION ADULT - PERTINENT HX OF CURRENT PROBLEM, REHAB EVAL
51 y/o male with obesity, no known significant PMHx (doesn't see doctors frequently), presented to [facility] ER 12/15 with atraumatic left shoulder pain and swelling x approximately 1 week. Reports pain has worsened and he is unable to lift or use the arm without significant pain. He underwent joint aspiration at [facility] concerning for septic join and was sent to [facility] ER for ortho admission, but in the ER was noted to be in SVT to 200s, resolved after adenosine 12mg IV x2 followed by Diltiazem 20mg IV x2, now sinus tachycardia. He felt slightly dizzy with mild palpitations at the time. This has never happened before. Denies CP, SOB, orthopnea/PND, leg swelling, LOC, fever, chills.     On arrival at [facility] ER, patient HD stable, T 100.9, /98, , SPO2 95% on RA. Labs notable for troponin negative, no leukocytosis, Na 134, K 3.2, bicarb 21, otherwise unremarkable. CXR with cardiomegaly. Patient later found to be in -200sbpm. Received adenosine 12mg IV x2 followed by Diltiazem 20mg IV x2, now sinus tachycardia. Also received zosyn. Admitted to cardiac tele for further management of SVT and eventual L shoulder washout with ortho.

## 2022-12-18 NOTE — PHYSICAL THERAPY INITIAL EVALUATION ADULT - GENERAL OBSERVATIONS, REHAB EVAL
Pt received and left semi-supine in bed +tele +IV +SCDs +cdi shoulder dressing, cleared for PT by PILAR Le, agreeable to PT Eval.

## 2022-12-18 NOTE — PROGRESS NOTE ADULT - ASSESSMENT
52M h/o obesity p/w L shoulder pain and swelling x 1 week, found to have L shoulder septic arthritis.  Now s/p washout on 12/17.  No trauma, no significant PMH, most likely organism is staph or strep spp (especially staph aureus).  His vanc was subtherapeutic with 1750mg q12h - will switch to dapto.    - stop vanco  - start daptomycin 900mg IV q24h  - cont CTX 2g IV q24h  - check CK  - f/u OR culture  - f/u BCx  - anticipate 4 weeks of abx therapy    Team 2 will follow you.  Case d/w primary team.    Digna Rojas MD, MS  Infectious Disease attending  work cell [phone]   For any questions during evening/weekend/holiday, please page ID on call

## 2022-12-18 NOTE — PROGRESS NOTE ADULT - SUBJECTIVE AND OBJECTIVE BOX
INFECTIOUS DISEASES CONSULT FOLLOW-UP NOTE    INTERVAL HPI/OVERNIGHT EVENTS:  Febrile to 102.3 overnight  patient reports L shoulder pain and chills  denied n/v/d, abdominal pain   vanc trough subtherapeutic     ROS:   Constitutional, eyes, ENT, cardiovascular, respiratory, gastrointestinal, genitourinary, integumentary, neurological, psychiatric and heme/lymph are otherwise negative other than noted above       ANTIBIOTICS/RELEVANT:    MEDICATIONS  (STANDING):  cefTRIAXone   IVPB 2000 milliGRAM(s) IV Intermittent every 24 hours  influenza   Vaccine 0.5 milliLiter(s) IntraMuscular once  metoprolol succinate ER 50 milliGRAM(s) Oral every 24 hours  NIFEdipine XL 30 milliGRAM(s) Oral every 24 hours  ondansetron Injectable 4 milliGRAM(s) IV Push once  sodium chloride 0.9%. 1000 milliLiter(s) (160 mL/Hr) IV Continuous <Continuous>    MEDICATIONS  (PRN):  acetaminophen     Tablet .. 975 milliGRAM(s) Oral every 8 hours PRN Temp greater or equal to 38C (100.4F)  benzocaine 15 mG/menthol 3.6 mG Lozenge 1 Lozenge Oral four times a day PRN Sore Throat  HYDROmorphone  Injectable 1 milliGRAM(s) IV Push every 4 hours PRN Severe Pain (7 - 10)  melatonin 5 milliGRAM(s) Oral at bedtime PRN Sleep  oxyCODONE    IR 5 milliGRAM(s) Oral every 4 hours PRN Moderate Pain (4 - 6)  oxyCODONE    IR 10 milliGRAM(s) Oral every 4 hours PRN Severe Pain (7 - 10)        Vital Signs Last 24 Hrs  T(C): 39.1 (18 Dec 2022 12:30), Max: 39.1 (18 Dec 2022 02:20)  T(F): 102.4 (18 Dec 2022 12:30), Max: 102.4 (18 Dec 2022 12:30)  HR: 121 (18 Dec 2022 12:30) (96 - 125)  BP: 160/81 (18 Dec 2022 12:30) (137/70 - 167/86)  BP(mean): 107 (17 Dec 2022 19:00) (102 - 124)  RR: 18 (18 Dec 2022 12:30) (16 - 20)  SpO2: 100% (18 Dec 2022 12:30) (99% - 100%)    Parameters below as of 18 Dec 2022 12:30  Patient On (Oxygen Delivery Method): room air        12-17-22 @ 07:01  -  12-18-22 @ 07:00  --------------------------------------------------------  IN: 4040 mL / OUT: 3000 mL / NET: 1040 mL    12-18-22 @ 07:01  -  12-18-22 @ 13:45  --------------------------------------------------------  IN: 0 mL / OUT: 600 mL / NET: -600 mL      PHYSICAL EXAM:  Constitutional: alert, NAD  Eyes: the sclera and conjunctiva were normal.   ENT: the ears and nose were normal in appearance.   Neck: the appearance of the neck was normal and the neck was supple.   Pulmonary: no respiratory distress and lungs were clear to auscultation bilaterally.   Heart: heart rate was normal and rhythm regular, normal S1 and S2  Vascular:. there was no peripheral edema  Abdomen: normal bowel sounds, soft, non-tender  Neurological: no focal deficits.   Ext: L shoulder covered with dressing         LABS:                        10.9   9.06  )-----------( 457      ( 18 Dec 2022 05:30 )             33.8     12-18    135  |  100  |  12  ----------------------------<  134<H>  4.0   |  25  |  1.01    Ca    8.8      18 Dec 2022 05:30  Mg     1.9     12-17    TPro  8.4<H>  /  Alb  3.4  /  TBili  1.0  /  DBili  x   /  AST  25  /  ALT  29  /  AlkPhos  111  12-17    PT/INR - ( 17 Dec 2022 08:14 )   PT: 15.4 sec;   INR: 1.29          PTT - ( 17 Dec 2022 08:14 )  PTT:28.8 sec      MICROBIOLOGY:  12/17 L shoulder abscess 1: p  12/17 L shoulder abscess 2: ngtd  12/17 L shoulder abscess 3: p  12/17 BCx ngtd x 2  12/17 BCx ngtd x 2      RADIOLOGY & ADDITIONAL STUDIES:  CT shoulder L 12/15  IMPRESSION:    Nonspecific large glenohumeral joint effusion. If there is clinical   concern for septic arthritis, joint aspiration should be performed.    Foci of soft tissue gas within the lateral deltoid muscle, which may be   related to a gas-forming/necrotizing infection or be related to recent   injection. Clinical correlation recommended.    No acute displaced fracture. Inferior subluxation of the humeral head at   the glenohumeral joint, likely related to the patient's large   glenohumeral joint effusion.    Indeterminate hypoattenuating nodule within the right lobe of the   thyroid. Recommend further evaluation with nonemergent thyroid ultrasound.

## 2022-12-19 LAB
-  CLINDAMYCIN: SIGNIFICANT CHANGE UP
-  DAPTOMYCIN: SIGNIFICANT CHANGE UP
-  ERYTHROMYCIN: SIGNIFICANT CHANGE UP
-  LINEZOLID: SIGNIFICANT CHANGE UP
-  OXACILLIN: SIGNIFICANT CHANGE UP
-  RIFAMPIN: SIGNIFICANT CHANGE UP
-  TETRACYCLINE: SIGNIFICANT CHANGE UP
-  TRIMETHOPRIM/SULFAMETHOXAZOLE: SIGNIFICANT CHANGE UP
-  VANCOMYCIN: SIGNIFICANT CHANGE UP
-  VANCOMYCIN: SIGNIFICANT CHANGE UP
ALBUMIN SERPL ELPH-MCNC: 3 G/DL — LOW (ref 3.3–5)
ALP SERPL-CCNC: 160 U/L — HIGH (ref 40–120)
ALT FLD-CCNC: 40 U/L — SIGNIFICANT CHANGE UP (ref 10–45)
ANION GAP SERPL CALC-SCNC: 10 MMOL/L — SIGNIFICANT CHANGE UP (ref 5–17)
ANION GAP SERPL CALC-SCNC: 8 MMOL/L — SIGNIFICANT CHANGE UP (ref 5–17)
APPEARANCE UR: CLEAR — SIGNIFICANT CHANGE UP
AST SERPL-CCNC: 36 U/L — SIGNIFICANT CHANGE UP (ref 10–40)
BACTERIA # UR AUTO: PRESENT /HPF
BILIRUB SERPL-MCNC: 0.7 MG/DL — SIGNIFICANT CHANGE UP (ref 0.2–1.2)
BILIRUB UR-MCNC: NEGATIVE — SIGNIFICANT CHANGE UP
BUN SERPL-MCNC: 12 MG/DL — SIGNIFICANT CHANGE UP (ref 7–23)
BUN SERPL-MCNC: 12 MG/DL — SIGNIFICANT CHANGE UP (ref 7–23)
CALCIUM SERPL-MCNC: 8.6 MG/DL — SIGNIFICANT CHANGE UP (ref 8.4–10.5)
CALCIUM SERPL-MCNC: 8.6 MG/DL — SIGNIFICANT CHANGE UP (ref 8.4–10.5)
CHLORIDE SERPL-SCNC: 100 MMOL/L — SIGNIFICANT CHANGE UP (ref 96–108)
CHLORIDE SERPL-SCNC: 99 MMOL/L — SIGNIFICANT CHANGE UP (ref 96–108)
CK SERPL-CCNC: 232 U/L — HIGH (ref 30–200)
CO2 SERPL-SCNC: 23 MMOL/L — SIGNIFICANT CHANGE UP (ref 22–31)
CO2 SERPL-SCNC: 25 MMOL/L — SIGNIFICANT CHANGE UP (ref 22–31)
COLOR SPEC: YELLOW — SIGNIFICANT CHANGE UP
CREAT SERPL-MCNC: 0.84 MG/DL — SIGNIFICANT CHANGE UP (ref 0.5–1.3)
CREAT SERPL-MCNC: 0.88 MG/DL — SIGNIFICANT CHANGE UP (ref 0.5–1.3)
CULTURE RESULTS: SIGNIFICANT CHANGE UP
CULTURE RESULTS: SIGNIFICANT CHANGE UP
DIFF PNL FLD: NEGATIVE — SIGNIFICANT CHANGE UP
EGFR: 103 ML/MIN/1.73M2 — SIGNIFICANT CHANGE UP
EGFR: 105 ML/MIN/1.73M2 — SIGNIFICANT CHANGE UP
EPI CELLS # UR: SIGNIFICANT CHANGE UP /HPF (ref 0–5)
GLUCOSE SERPL-MCNC: 119 MG/DL — HIGH (ref 70–99)
GLUCOSE SERPL-MCNC: 153 MG/DL — HIGH (ref 70–99)
GLUCOSE UR QL: NEGATIVE — SIGNIFICANT CHANGE UP
HCT VFR BLD CALC: 32.2 % — LOW (ref 39–50)
HCT VFR BLD CALC: 32.7 % — LOW (ref 39–50)
HGB BLD-MCNC: 10.4 G/DL — LOW (ref 13–17)
HGB BLD-MCNC: 10.7 G/DL — LOW (ref 13–17)
KETONES UR-MCNC: NEGATIVE — SIGNIFICANT CHANGE UP
LACTATE SERPL-SCNC: 0.9 MMOL/L — SIGNIFICANT CHANGE UP (ref 0.5–2)
LEUKOCYTE ESTERASE UR-ACNC: NEGATIVE — SIGNIFICANT CHANGE UP
MCHC RBC-ENTMCNC: 26.7 PG — LOW (ref 27–34)
MCHC RBC-ENTMCNC: 26.9 PG — LOW (ref 27–34)
MCHC RBC-ENTMCNC: 32.3 GM/DL — SIGNIFICANT CHANGE UP (ref 32–36)
MCHC RBC-ENTMCNC: 32.7 GM/DL — SIGNIFICANT CHANGE UP (ref 32–36)
MCV RBC AUTO: 82.2 FL — SIGNIFICANT CHANGE UP (ref 80–100)
MCV RBC AUTO: 82.6 FL — SIGNIFICANT CHANGE UP (ref 80–100)
METHOD TYPE: SIGNIFICANT CHANGE UP
METHOD TYPE: SIGNIFICANT CHANGE UP
NITRITE UR-MCNC: NEGATIVE — SIGNIFICANT CHANGE UP
NRBC # BLD: 0 /100 WBCS — SIGNIFICANT CHANGE UP (ref 0–0)
NRBC # BLD: 0 /100 WBCS — SIGNIFICANT CHANGE UP (ref 0–0)
ORGANISM # SPEC MICROSCOPIC CNT: SIGNIFICANT CHANGE UP
PH UR: 6 — SIGNIFICANT CHANGE UP (ref 5–8)
PLATELET # BLD AUTO: 419 K/UL — HIGH (ref 150–400)
PLATELET # BLD AUTO: 475 K/UL — HIGH (ref 150–400)
POTASSIUM SERPL-MCNC: 4 MMOL/L — SIGNIFICANT CHANGE UP (ref 3.5–5.3)
POTASSIUM SERPL-MCNC: 4.5 MMOL/L — SIGNIFICANT CHANGE UP (ref 3.5–5.3)
POTASSIUM SERPL-SCNC: 4 MMOL/L — SIGNIFICANT CHANGE UP (ref 3.5–5.3)
POTASSIUM SERPL-SCNC: 4.5 MMOL/L — SIGNIFICANT CHANGE UP (ref 3.5–5.3)
PROT SERPL-MCNC: 8 G/DL — SIGNIFICANT CHANGE UP (ref 6–8.3)
PROT UR-MCNC: 30 MG/DL
RBC # BLD: 3.9 M/UL — LOW (ref 4.2–5.8)
RBC # BLD: 3.98 M/UL — LOW (ref 4.2–5.8)
RBC # FLD: 15.5 % — HIGH (ref 10.3–14.5)
RBC # FLD: 15.6 % — HIGH (ref 10.3–14.5)
RBC CASTS # UR COMP ASSIST: < 5 /HPF — SIGNIFICANT CHANGE UP
SODIUM SERPL-SCNC: 132 MMOL/L — LOW (ref 135–145)
SODIUM SERPL-SCNC: 133 MMOL/L — LOW (ref 135–145)
SP GR SPEC: 1.02 — SIGNIFICANT CHANGE UP (ref 1–1.03)
SPECIMEN SOURCE: SIGNIFICANT CHANGE UP
SPECIMEN SOURCE: SIGNIFICANT CHANGE UP
UROBILINOGEN FLD QL: 2 E.U./DL
WBC # BLD: 9.12 K/UL — SIGNIFICANT CHANGE UP (ref 3.8–10.5)
WBC # BLD: 9.42 K/UL — SIGNIFICANT CHANGE UP (ref 3.8–10.5)
WBC # FLD AUTO: 9.12 K/UL — SIGNIFICANT CHANGE UP (ref 3.8–10.5)
WBC # FLD AUTO: 9.42 K/UL — SIGNIFICANT CHANGE UP (ref 3.8–10.5)
WBC UR QL: < 5 /HPF — SIGNIFICANT CHANGE UP

## 2022-12-19 PROCEDURE — 99232 SBSQ HOSP IP/OBS MODERATE 35: CPT

## 2022-12-19 PROCEDURE — 99233 SBSQ HOSP IP/OBS HIGH 50: CPT

## 2022-12-19 PROCEDURE — 99223 1ST HOSP IP/OBS HIGH 75: CPT

## 2022-12-19 PROCEDURE — 93010 ELECTROCARDIOGRAM REPORT: CPT

## 2022-12-19 RX ADMIN — OXYCODONE HYDROCHLORIDE 10 MILLIGRAM(S): 5 TABLET ORAL at 21:20

## 2022-12-19 RX ADMIN — OXYCODONE HYDROCHLORIDE 10 MILLIGRAM(S): 5 TABLET ORAL at 02:05

## 2022-12-19 RX ADMIN — OXYCODONE HYDROCHLORIDE 10 MILLIGRAM(S): 5 TABLET ORAL at 06:50

## 2022-12-19 RX ADMIN — Medication 975 MILLIGRAM(S): at 23:02

## 2022-12-19 RX ADMIN — DAPTOMYCIN 136 MILLIGRAM(S): 500 INJECTION, POWDER, LYOPHILIZED, FOR SOLUTION INTRAVENOUS at 22:44

## 2022-12-19 RX ADMIN — Medication 50 MILLIGRAM(S): at 07:20

## 2022-12-19 RX ADMIN — Medication 975 MILLIGRAM(S): at 08:04

## 2022-12-19 RX ADMIN — HYDROMORPHONE HYDROCHLORIDE 1 MILLIGRAM(S): 2 INJECTION INTRAMUSCULAR; INTRAVENOUS; SUBCUTANEOUS at 16:45

## 2022-12-19 RX ADMIN — SODIUM CHLORIDE 160 MILLILITER(S): 9 INJECTION INTRAMUSCULAR; INTRAVENOUS; SUBCUTANEOUS at 22:44

## 2022-12-19 RX ADMIN — HYDROMORPHONE HYDROCHLORIDE 1 MILLIGRAM(S): 2 INJECTION INTRAMUSCULAR; INTRAVENOUS; SUBCUTANEOUS at 16:12

## 2022-12-19 RX ADMIN — HYDROMORPHONE HYDROCHLORIDE 1 MILLIGRAM(S): 2 INJECTION INTRAMUSCULAR; INTRAVENOUS; SUBCUTANEOUS at 04:05

## 2022-12-19 RX ADMIN — CEFTRIAXONE 100 MILLIGRAM(S): 500 INJECTION, POWDER, FOR SOLUTION INTRAMUSCULAR; INTRAVENOUS at 07:20

## 2022-12-19 RX ADMIN — SODIUM CHLORIDE 160 MILLILITER(S): 9 INJECTION INTRAMUSCULAR; INTRAVENOUS; SUBCUTANEOUS at 10:58

## 2022-12-19 RX ADMIN — OXYCODONE HYDROCHLORIDE 10 MILLIGRAM(S): 5 TABLET ORAL at 06:08

## 2022-12-19 RX ADMIN — Medication 30 MILLIGRAM(S): at 10:59

## 2022-12-19 RX ADMIN — OXYCODONE HYDROCHLORIDE 10 MILLIGRAM(S): 5 TABLET ORAL at 01:22

## 2022-12-19 RX ADMIN — HYDROMORPHONE HYDROCHLORIDE 1 MILLIGRAM(S): 2 INJECTION INTRAMUSCULAR; INTRAVENOUS; SUBCUTANEOUS at 11:30

## 2022-12-19 RX ADMIN — OXYCODONE HYDROCHLORIDE 10 MILLIGRAM(S): 5 TABLET ORAL at 20:36

## 2022-12-19 RX ADMIN — HYDROMORPHONE HYDROCHLORIDE 1 MILLIGRAM(S): 2 INJECTION INTRAMUSCULAR; INTRAVENOUS; SUBCUTANEOUS at 10:58

## 2022-12-19 RX ADMIN — HYDROMORPHONE HYDROCHLORIDE 1 MILLIGRAM(S): 2 INJECTION INTRAMUSCULAR; INTRAVENOUS; SUBCUTANEOUS at 04:30

## 2022-12-19 NOTE — PROGRESS NOTE ADULT - SUBJECTIVE AND OBJECTIVE BOX
POST OPERATIVE DAY # 2   SUBJECTIVE: Patient seen and examined.  Pt without complaints. Endorses episode of palpitations and  overnight; also had episode of dizziness; feeling better now - without dizziness, cp or SOB. Pt explains at home he feels like he has palpitations when he is hungry and he does not seek medical care for it.   Denies chest pain/SOB/dizziness/n/v/HA   Pain well controlled.     Pt febrile 101.3 this AM     OBJECTIVE:     Vital Signs Last 24 Hrs  T(C): 37.7 (19 Dec 2022 13:30), Max: 38.7 (18 Dec 2022 21:42)  T(F): 99.8 (19 Dec 2022 13:30), Max: 101.6 (18 Dec 2022 21:42)  HR: 108 (19 Dec 2022 13:30) (100 - 160)  BP: 153/90 (19 Dec 2022 13:30) (118/67 - 160/76)  BP(mean): --  RR: 16 (19 Dec 2022 13:30) (16 - 20)  SpO2: 100% (19 Dec 2022 13:30) (98% - 100%)    Parameters below as of 19 Dec 2022 13:30  Patient On (Oxygen Delivery Method): room air        PE:  Comfortable in bed, sitting up,         Skin warm to touch, no acute distress          Dressing: clean/dry/intact - tape          Sensation: intact to light touch to patient's baseline BUE         Motor exam:  firing ain/pin/u nerves BUE.          Skin warm, well-perfused; capillary refill brisk BUE; radial pulse palpable BUE              LABS:                        10.4   9.12  )-----------( 475      ( 19 Dec 2022 11:44 )             32.2     12-19    132<L>  |  99  |  12  ----------------------------<  153<H>  4.0   |  25  |  0.88    Ca    8.6      19 Dec 2022 11:44    TPro  8.0  /  Alb  3.0<L>  /  TBili  0.7  /  DBili  x   /  AST  36  /  ALT  40  /  AlkPhos  160<H>  12-19      Urinalysis Basic - ( 19 Dec 2022 04:15 )  Color: Yellow / Appearance: Clear / S.025 / pH: x  Gluc: x / Ketone: NEGATIVE  / Bili: Negative / Urobili: 2.0 E.U./dL   Blood: x / Protein: 30 mg/dL / Nitrite: NEGATIVE   Leuk Esterase: NEGATIVE / RBC: < 5 /HPF / WBC < 5 /HPF   Sq Epi: x / Non Sq Epi: 0-5 /HPF / Bacteria: Present /HPF    ASSESSMENT AND PLAN: POD 2 left shoulder wash out, with on going fever work up, with tachycardia overnight   1. Fever: continue tylenol for fever; blood cultures sent- NGTD, follow up repeat labs, EKG sinus rhythm   - Appreciate Cardio recs, will continue nifedipine and toprol; control fever  - Appreciate ID recs, will continue dapto   - Follow OR culture, follow blood cultures   - Appreciate Med following, will f/u recs   - AM Labs   2. Analgesic pain control  3. DVT prophylaxis: SCDs   4. Weight Bearing Status:  WBAT   5. Disposition: pending fever/tachy/infection optimization

## 2022-12-19 NOTE — PROGRESS NOTE ADULT - SUBJECTIVE AND OBJECTIVE BOX
Ortho Progress Note    Procedure: L shoulder arthroscopic washout  Surgeon: Sulma    Pt comfortable without complaints, pain controlled. Tmax 101.6 overnight given tylenol    Denies CP, SOB, N/V, numbness/tingling     Vital Signs Last 24 Hrs  T(C): 38.5 (19 Dec 2022 09:00), Max: 39.1 (18 Dec 2022 12:30)  T(F): 101.3 (19 Dec 2022 09:00), Max: 102.4 (18 Dec 2022 12:30)  HR: 160 (19 Dec 2022 09:00) (100 - 160)  BP: 160/76 (19 Dec 2022 09:00) (118/67 - 160/81)  BP(mean): --  RR: 20 (19 Dec 2022 09:00) (16 - 20)  SpO2: 100% (19 Dec 2022 09:00) (98% - 100%)    Parameters below as of 19 Dec 2022 09:00  Patient On (Oxygen Delivery Method): room air        Physical Exam:  General: Pt Alert and oriented, NAD  L shoulder DSG C/D/I  Pulses: 2+ radial pulses, wwp, cap refill <3 sec  Sensation: SILT in axillary/radial/median/ulnar distributions  Motor: axillary/AIN/PIN/ulnar firing. Pain with FF to 40                          10.7   9.42  )-----------( 419      ( 19 Dec 2022 05:30 )             32.7     Culture - Abscess with Gram Stain (12.17.22 @ 09:23)    Gram Stain:   No organisms seen  Moderate White blood cells    Specimen Source: .Abscess 3. Left shoulder abscess    Culture Results:   Rare Staphylococcus aureus presumptive Methicillin resistant  Confirmation to follow within 24 hours  Result called to and read back by_ Ms. DINO Mckenzie RN  12/18/2022 15:12:12      A/P: 52yMale s/p L shoulder arthroscopic washout  - Stable  - Pain Control  - f/u AM labs  - DVT ppx: SCDs  - Post op abx: dapto/CTX  - ID consult  - PT, WBS: WBAT  - Dispo: pending ID recs

## 2022-12-19 NOTE — OCCUPATIONAL THERAPY INITIAL EVALUATION ADULT - ADDITIONAL COMMENTS
Pt reports that he lives with his significant other and 2 friends in a 4th floor walk-up apartment. Prior to admit, pt states being independent with all ADLs/IADLs and mobility, did not require any DME or ADs. Denies history of falls. Pt is L hand dominant.

## 2022-12-19 NOTE — OCCUPATIONAL THERAPY INITIAL EVALUATION ADULT - RANGE OF MOTION EXAMINATION, UPPER EXTREMITY
Limited L shoulder flexion/extension 2/2 surgical pain and dressing. L elbow/wrist/digit AROM WFL./Right UE Active ROM was WFL (within functional limits)

## 2022-12-19 NOTE — CONSULT NOTE ADULT - ATTENDING COMMENTS
Initial attending contact date  12/19/22    . See fellow note written above for details. I reviewed the fellow documentation. I have personally seen and examined this patient. I reviewed vitals, labs, medications, cardiac studies, and additional imaging. I agree with the above fellow's findings and plans as written above with the following additions/statements.    51 y/o male with obesity, no known significant PMHx presented to [facility] ER 12/15 with atraumatic left shoulder pain and swelling x approximately 1 week, found to have septic L shoulder joint but in the ER was noted to be in SVT to 200s, admitted to cardiac tele for further management of SVT. Patient was then moved to Orthopedic service for washout on 12/17. Cardiology following for SVT.     -SVT without past history of SVT. 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 Was seen by EP and planned for possible outpatient ablation   - ECG: Repeat ECG with normal sinus rhythm  - Tele this AM: Sinus tachycardia up to 150-160's in the setting of fever. Currently sinus tach 110's after resolution of fever  - TTE: Normal LV fx, mild LVH, trivial effusion  - Sinus tachycardia in the setting of fever. Would control underlying etiology.   - c/w Toprol 50mg qd for now

## 2022-12-19 NOTE — PROGRESS NOTE ADULT - ASSESSMENT
52M h/o obesity p/w L shoulder pain and swelling x 1 week, found to have L shoulder septic arthritis.  Now s/p washout on 12/17.  OR culture growing MRSA.  Still persistently febrile.      - cont daptomycin 900mg IV q24h  - stop CTX  - f/u OR culture  - f/u BCx  - anticipate 4 weeks of abx therapy    Team 2 will follow you.  Case d/w primary team.    Digna Rojas MD, MS  Infectious Disease attending  work cell [phone]   For any questions during evening/weekend/holiday, please page ID on call

## 2022-12-19 NOTE — OCCUPATIONAL THERAPY INITIAL EVALUATION ADULT - DIAGNOSIS, OT EVAL
Pt is s/p L shoulder I&D (12/17) presents with c/o L shoulder pain and limited L shoulder ROM and strength impacting ability to perform ADLs and functional mobility tasks at prior level of function.

## 2022-12-19 NOTE — OCCUPATIONAL THERAPY INITIAL EVALUATION ADULT - LUE MMT, REHAB EVAL
L  5/5, L elbow flexion/extension 3+/5 (limited by shoulder discomfort), L shoulder flexion/extension 2/5

## 2022-12-19 NOTE — CONSULT NOTE ADULT - ASSESSMENT
52 year old male admitted for septic joint     Impression and Plan   # Sepsis   # Septic Left shoulder Joint s/p Washout 12/17   # SVT   # Hyponatremia       Incomplete 52 year old male admitted for septic joint     Impression and Plan   # Sepsis   # Septic Left shoulder Joint s/p Washout 12/17   - IV Daptomycin , MRSA in Culture from OR , patient still febrile   - Follow Blood cx   - tyelenol for fever    # SVT   -s/p adenosine , continue metoprolol xl and nifedipine     # Hyponatremia   - Check Urine Na , Urine Osm     # DVT prophylaxis

## 2022-12-19 NOTE — CONSULT NOTE ADULT - SUBJECTIVE AND OBJECTIVE BOX
HPI:  53 y/o male with obesity, no known significant PMHx (doesn't see doctors frequently), presented to [facility] ER 12/15 with atraumatic left shoulder pain and swelling x approximately 1 week, found to have septic L shoulder joint and was sent to [facility] ER for ortho admission, but in the ER was noted to be in SVT to 200s (resolved after adenosine 12mg IV x2 followed by Diltiazem 20mg IV x2), and admitted to cardiac tele for further management of SVT. Patient was then moved to Orthopedic service for washout on 12/17. Cardiology consulted for SVT.         ROS: A 10-point review of systems was otherwise negative.    PAST MEDICAL & SURGICAL HISTORY:  Obesity      No significant past surgical history          SOCIAL HISTORY:  FAMILY HISTORY:  No pertinent family history in first degree relatives        ALLERGIES: 	  No Known Allergies            MEDICATIONS:  acetaminophen     Tablet .. 975 milliGRAM(s) Oral every 8 hours PRN  benzocaine 15 mG/menthol 3.6 mG Lozenge 1 Lozenge Oral four times a day PRN  cefTRIAXone   IVPB 2000 milliGRAM(s) IV Intermittent every 24 hours  DAPTOmycin IVPB 900 milliGRAM(s) IV Intermittent every 24 hours  HYDROmorphone  Injectable 1 milliGRAM(s) IV Push every 4 hours PRN  influenza   Vaccine 0.5 milliLiter(s) IntraMuscular once  melatonin 5 milliGRAM(s) Oral at bedtime PRN  metoprolol succinate ER 50 milliGRAM(s) Oral every 24 hours  NIFEdipine XL 30 milliGRAM(s) Oral every 24 hours  ondansetron Injectable 4 milliGRAM(s) IV Push once  oxyCODONE    IR 5 milliGRAM(s) Oral every 4 hours PRN  oxyCODONE    IR 10 milliGRAM(s) Oral every 4 hours PRN  sodium chloride 0.9%. 1000 milliLiter(s) IV Continuous <Continuous>      PHYSICAL EXAM:  T(C): 38.5 (12-19-22 @ 09:00), Max: 39.1 (12-18-22 @ 12:30)  HR: 160 (12-19-22 @ 09:00) (100 - 160)  BP: 160/76 (12-19-22 @ 09:00) (118/67 - 160/81)  RR: 20 (12-19-22 @ 09:00) (16 - 20)  SpO2: 100% (12-19-22 @ 09:00) (98% - 100%)  Wt(kg): --    GEN: Awake, comfortable. NAD.   HEENT: NCAT, PERRL, EOMI. Mucosa moist. No JVD.   RESP: CTA b/l  CV: RRR, normal s1/s2. No m/r/g.  ABD: Soft, NTND. BS+  EXT: Warm. No edema, clubbing, or cyanosis.   NEURO: AAOx3. No focal deficits.    I&O's Summary    18 Dec 2022 07:01  -  19 Dec 2022 07:00  --------------------------------------------------------  IN: 1010 mL / OUT: 1200 mL / NET: -190 mL    19 Dec 2022 07:01  -  19 Dec 2022 11:03  --------------------------------------------------------  IN: 360 mL / OUT: 350 mL / NET: 10 mL        	  LABS:	 	    CARDIAC MARKERS:                                  10.7   9.42  )-----------( 419      ( 19 Dec 2022 05:30 )             32.7     12-19    133<L>  |  100  |  12  ----------------------------<  119<H>  4.5   |  23  |  0.84    Ca    8.6      19 Dec 2022 05:30      proBNP:   Lipid Profile:   HgA1c:   TSH:     TELEMETRY: 	    ECG:  	  RADIOLOGY:   ECHO:  STRESS:  CATH:   HPI:  51 y/o male with obesity, no known significant PMHx (doesn't see doctors frequently), presented to [facility] ER 12/15 with atraumatic left shoulder pain and swelling x approximately 1 week, found to have septic L shoulder joint and was sent to [facility] ER for ortho admission, but in the ER was noted to be in SVT to 200s (resolved after adenosine 12mg IV x2 followed by Diltiazem 20mg IV x2), and admitted to cardiac tele for further management of SVT. Patient was then moved to Orthopedic service for washout on 12/17. Cardiology following along. Pt was previously seen by EP while on cardiac telemetry with plans for ablation as OP for AVNRT. His course has been c/b fevers. ID following and patient on broad spectrum abx.       ROS: A 10-point review of systems was otherwise negative.    PAST MEDICAL & SURGICAL HISTORY:  Obesity      No significant past surgical history          SOCIAL HISTORY:  FAMILY HISTORY:  No pertinent family history in first degree relatives        ALLERGIES: 	  No Known Allergies            MEDICATIONS:  acetaminophen     Tablet .. 975 milliGRAM(s) Oral every 8 hours PRN  benzocaine 15 mG/menthol 3.6 mG Lozenge 1 Lozenge Oral four times a day PRN  cefTRIAXone   IVPB 2000 milliGRAM(s) IV Intermittent every 24 hours  DAPTOmycin IVPB 900 milliGRAM(s) IV Intermittent every 24 hours  HYDROmorphone  Injectable 1 milliGRAM(s) IV Push every 4 hours PRN  influenza   Vaccine 0.5 milliLiter(s) IntraMuscular once  melatonin 5 milliGRAM(s) Oral at bedtime PRN  metoprolol succinate ER 50 milliGRAM(s) Oral every 24 hours  NIFEdipine XL 30 milliGRAM(s) Oral every 24 hours  ondansetron Injectable 4 milliGRAM(s) IV Push once  oxyCODONE    IR 5 milliGRAM(s) Oral every 4 hours PRN  oxyCODONE    IR 10 milliGRAM(s) Oral every 4 hours PRN  sodium chloride 0.9%. 1000 milliLiter(s) IV Continuous <Continuous>      PHYSICAL EXAM:  T(C): 38.5 (12-19-22 @ 09:00), Max: 39.1 (12-18-22 @ 12:30)  HR: 160 (12-19-22 @ 09:00) (100 - 160)  BP: 160/76 (12-19-22 @ 09:00) (118/67 - 160/81)  RR: 20 (12-19-22 @ 09:00) (16 - 20)  SpO2: 100% (12-19-22 @ 09:00) (98% - 100%)  Wt(kg): --    GEN: Awake, comfortable. NAD.   HEENT: NCAT, PERRL, EOMI. Mucosa moist. No JVD.   RESP: CTA b/l  CV: tachycardic, normal s1/s2. No m/r/g.  ABD: Soft, NTND. BS+  EXT: Warm. No edema, clubbing, or cyanosis.   NEURO: AAOx3. No focal deficits.    I&O's Summary    18 Dec 2022 07:01  -  19 Dec 2022 07:00  --------------------------------------------------------  IN: 1010 mL / OUT: 1200 mL / NET: -190 mL    19 Dec 2022 07:01  -  19 Dec 2022 11:03  --------------------------------------------------------  IN: 360 mL / OUT: 350 mL / NET: 10 mL        	  LABS:	 	    CARDIAC MARKERS:                                  10.7   9.42  )-----------( 419      ( 19 Dec 2022 05:30 )             32.7     12-19    133<L>  |  100  |  12  ----------------------------<  119<H>  4.5   |  23  |  0.84    Ca    8.6      19 Dec 2022 05:30    < from: TTE Echo Complete w/ Contrast w/ Doppler (12.16.22 @ 15:22) >  CONCLUSIONS:     1. Technically difficult study. Very limited exam-tech notes patient is   sitting up during imaging.   2. Mild symmetric left ventricular hypertrophy.   3. Normal LV systolic function.   4. The right ventricle is not well visualized.   5. Limited valvular evaluation.   6. Trivial pericardial effusion.    < end of copied text >

## 2022-12-19 NOTE — CONSULT NOTE ADULT - ASSESSMENT
53 y/o male with obesity, no known significant PMHx (doesn't see doctors frequently), presented to [facility] ER 12/15 with atraumatic left shoulder pain and swelling x approximately 1 week, found to have septic L shoulder joint and was sent to [facility] ER for ortho admission, but in the ER was noted to be in SVT to 200s (resolved after adenosine 12mg IV x2 followed by Diltiazem 20mg IV x2), and admitted to cardiac tele for further management of SVT. Patient was then moved to Orthopedic service for washout on 12/17. Cardiology consulted for SVT.     INCOMPLETE  53 y/o male with obesity, no known significant PMHx presented to [facility] ER 12/15 with atraumatic left shoulder pain and swelling x approximately 1 week, found to have septic L shoulder joint but in the ER was noted to be in SVT to 200s, admitted to cardiac tele for further management of SVT. Patient was then moved to Orthopedic service for washout on 12/17. Cardiology following for SVT.     #SVT  Patient with no past history of SVT. 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 Was seen by EP and planned for possible outpatient ablation   - ECG: Repeat ECG with normal sinus rhythm  - Tele this AM: Sinus tachycardia up to 150-160's in the setting of fever. Currently sinus tach 110's after resolution of fever  - TTE: Normal LV fx, mild LVH, trivial effusion  - Sinus tachycardia in the setting of fever. Would control underlying etiology.   - c/w Toprol 50mg qd for now     #HTN   - Started on nifedipine 30mg qd this admission  - Remains above goal but given ongoing sepsis, will hold off on titrating up.     Cardiology to follow.

## 2022-12-19 NOTE — PROGRESS NOTE ADULT - SUBJECTIVE AND OBJECTIVE BOX
INFECTIOUS DISEASES CONSULT FOLLOW-UP NOTE    INTERVAL HPI/OVERNIGHT EVENTS:  patient febrile to 101.3 yesterday   reports L shoulder pain   denied n/v/d /abdominal pain       ROS:   Constitutional, eyes, ENT, cardiovascular, respiratory, gastrointestinal, genitourinary, integumentary, neurological, psychiatric and heme/lymph are otherwise negative other than noted above       ANTIBIOTICS/RELEVANT:    MEDICATIONS  (STANDING):  cefTRIAXone   IVPB 2000 milliGRAM(s) IV Intermittent every 24 hours  DAPTOmycin IVPB 900 milliGRAM(s) IV Intermittent every 24 hours  influenza   Vaccine 0.5 milliLiter(s) IntraMuscular once  metoprolol succinate ER 50 milliGRAM(s) Oral every 24 hours  NIFEdipine XL 30 milliGRAM(s) Oral every 24 hours  ondansetron Injectable 4 milliGRAM(s) IV Push once  sodium chloride 0.9%. 1000 milliLiter(s) (160 mL/Hr) IV Continuous <Continuous>    MEDICATIONS  (PRN):  acetaminophen     Tablet .. 975 milliGRAM(s) Oral every 8 hours PRN Temp greater or equal to 38C (100.4F)  benzocaine 15 mG/menthol 3.6 mG Lozenge 1 Lozenge Oral four times a day PRN Sore Throat  HYDROmorphone  Injectable 1 milliGRAM(s) IV Push every 4 hours PRN Severe Pain (7 - 10)  melatonin 5 milliGRAM(s) Oral at bedtime PRN Sleep  oxyCODONE    IR 5 milliGRAM(s) Oral every 4 hours PRN Moderate Pain (4 - 6)  oxyCODONE    IR 10 milliGRAM(s) Oral every 4 hours PRN Severe Pain (7 - 10)        Vital Signs Last 24 Hrs  T(C): 38.5 (19 Dec 2022 09:00), Max: 38.7 (18 Dec 2022 21:42)  T(F): 101.3 (19 Dec 2022 09:00), Max: 101.6 (18 Dec 2022 21:42)  HR: 110 (19 Dec 2022 10:40) (100 - 160)  BP: 133/84 (19 Dec 2022 10:40) (118/67 - 160/76)  BP(mean): --  RR: 20 (19 Dec 2022 09:00) (16 - 20)  SpO2: 100% (19 Dec 2022 09:00) (98% - 100%)    Parameters below as of 19 Dec 2022 09:00  Patient On (Oxygen Delivery Method): room air        22 @ 07:01  -  22 @ 07:00  --------------------------------------------------------  IN: 1010 mL / OUT: 1200 mL / NET: -190 mL    22 @ 07:01  -  22 @ 13:37  --------------------------------------------------------  IN: 360 mL / OUT: 350 mL / NET: 10 mL      PHYSICAL EXAM:  Constitutional: alert, NAD  Eyes: the sclera and conjunctiva were normal.   ENT: the ears and nose were normal in appearance.   Neck: the appearance of the neck was normal and the neck was supple.   Pulmonary: no respiratory distress and lungs were clear to auscultation bilaterally.   Heart: heart rate was normal and rhythm regular, normal S1 and S2  Vascular:. there was no peripheral edema  Abdomen: normal bowel sounds, soft, non-tender  Neurological: no focal deficits.   Ext: L shoulder covered with dressing         LABS:                        10.4   9.12  )-----------( 475      ( 19 Dec 2022 11:44 )             32.2         132<L>  |  99  |  12  ----------------------------<  153<H>  4.0   |  25  |  0.88    Ca    8.6      19 Dec 2022 11:44    TPro  8.0  /  Alb  3.0<L>  /  TBili  0.7  /  DBili  x   /  AST  36  /  ALT  40  /  AlkPhos  160<H>        Urinalysis Basic - ( 19 Dec 2022 04:15 )    Color: Yellow / Appearance: Clear / S.025 / pH: x  Gluc: x / Ketone: NEGATIVE  / Bili: Negative / Urobili: 2.0 E.U./dL   Blood: x / Protein: 30 mg/dL / Nitrite: NEGATIVE   Leuk Esterase: NEGATIVE / RBC: < 5 /HPF / WBC < 5 /HPF   Sq Epi: x / Non Sq Epi: 0-5 /HPF / Bacteria: Present /HPF        MICROBIOLOGY:   BCx ngtd x 2   L shoulder abscess AFB: p x 3   L shoulder abscess fungal: p x 3   L shoulder abscess 1: MRSA   L shoulder abscess 2: p   L shoulder abscess 3: MRSA (S to vanc, clinda, dapto, linez, rif, bact)  12/15 BCx ngtd x 2  12/15 BCx ngtd x 2      RADIOLOGY & ADDITIONAL STUDIES:  CT shoulder L 12/15  IMPRESSION:    Nonspecific large glenohumeral joint effusion. If there is clinical   concern for septic arthritis, joint aspiration should be performed.    Foci of soft tissue gas within the lateral deltoid muscle, which may be   related to a gas-forming/necrotizing infection or be related to recent   injection. Clinical correlation recommended.    No acute displaced fracture. Inferior subluxation of the humeral head at   the glenohumeral joint, likely related to the patient's large   glenohumeral joint effusion.    Indeterminate hypoattenuating nodule within the right lobe of the   thyroid. Recommend further evaluation with nonemergent thyroid ultrasound.

## 2022-12-19 NOTE — PROGRESS NOTE ADULT - SUBJECTIVE AND OBJECTIVE BOX
Ortho Progress Note    Procedure: L shoulder arthroscopic washout  Surgeon: Sulma    Pt comfortable without complaints, pain controlled. NGTD.  Denies CP, SOB, N/V, numbness/tingling     Vital Signs Last 24 Hrs  T(C): 37.3 (18 Dec 2022 22:38), Max: 39.1 (18 Dec 2022 02:20)  T(F): 99.1 (18 Dec 2022 22:38), Max: 102.4 (18 Dec 2022 12:30)  HR: 110 (18 Dec 2022 22:38) (100 - 125)  BP: 118/67 (18 Dec 2022 22:38) (118/67 - 167/86)  BP(mean): --  RR: 18 (18 Dec 2022 22:38) (18 - 18)  SpO2: 100% (18 Dec 2022 22:38) (100% - 100%)    Parameters below as of 18 Dec 2022 22:38  Patient On (Oxygen Delivery Method): room air        Physical Exam:  General: Pt Alert and oriented, NAD  L shoulder DSG C/D/I  Pulses: 2+ radial pulses, wwp, cap refill <3 sec  Sensation: SILT in axillary/radial/median/ulnar distributions  Motor: axillary/AIN/PIN/ulnar firing. Pain with FF to 40                                     10.9   10.34 )-----------( 487      ( 18 Dec 2022 22:49 )             33.1       A/P: 52yMale s/p L shoulder arthroscopic washout  - Stable  - Pain Control  - f/u AM labs  - DVT ppx: SCDs  - Post op abx: vanco/zosyn  - ID consult  - PT, WBS: WBAT  - Dispo: pending ID recs

## 2022-12-19 NOTE — OCCUPATIONAL THERAPY INITIAL EVALUATION ADULT - GENERAL OBSERVATIONS, REHAB EVAL
Pt received semi-supine in bed, +IV, +tele, +L shoulder dressing C/D/I, in NAD and agreeable to OT. Cleared by PILAR Simmons to see.

## 2022-12-19 NOTE — CONSULT NOTE ADULT - SUBJECTIVE AND OBJECTIVE BOX
Patient is a 52y old  Male who presents with a chief complaint of septic L shoulder (19 Dec 2022 13:37)        HPI :  52 year old male admitted to the hospital for left shoulder pain ,     REVIEW OF SYSTEMS: 12 point review of systems negative except those stated else where in the note       PAST MEDICAL & SURGICAL HISTORY:  Obesity      No significant past surgical history          Social History:  no smoking, ETOH, or illicits (16 Dec 2022 12:24)      FAMILY HISTORY:  No pertinent family history in first degree relatives        Home Medications:      MEDICATIONS  (STANDING):  DAPTOmycin IVPB 900 milliGRAM(s) IV Intermittent every 24 hours  influenza   Vaccine 0.5 milliLiter(s) IntraMuscular once  metoprolol succinate ER 50 milliGRAM(s) Oral every 24 hours  NIFEdipine XL 30 milliGRAM(s) Oral every 24 hours  ondansetron Injectable 4 milliGRAM(s) IV Push once  sodium chloride 0.9%. 1000 milliLiter(s) (160 mL/Hr) IV Continuous <Continuous>    MEDICATIONS  (PRN):  acetaminophen     Tablet .. 975 milliGRAM(s) Oral every 8 hours PRN Temp greater or equal to 38C (100.4F)  benzocaine 15 mG/menthol 3.6 mG Lozenge 1 Lozenge Oral four times a day PRN Sore Throat  HYDROmorphone  Injectable 1 milliGRAM(s) IV Push every 4 hours PRN Severe Pain (7 - 10)  melatonin 5 milliGRAM(s) Oral at bedtime PRN Sleep  oxyCODONE    IR 5 milliGRAM(s) Oral every 4 hours PRN Moderate Pain (4 - 6)  oxyCODONE    IR 10 milliGRAM(s) Oral every 4 hours PRN Severe Pain (7 - 10)      Vital Signs Last 24 Hrs  T(C): 37.7 (19 Dec 2022 13:30), Max: 38.7 (18 Dec 2022 21:42)  T(F): 99.8 (19 Dec 2022 13:30), Max: 101.6 (18 Dec 2022 21:42)  HR: 108 (19 Dec 2022 13:30) (100 - 160)  BP: 153/90 (19 Dec 2022 13:30) (118/67 - 160/76)  BP(mean): --  RR: 16 (19 Dec 2022 13:30) (16 - 20)  SpO2: 100% (19 Dec 2022 13:30) (98% - 100%)      22 @ 07:01  -  22 @ 07:00  --------------------------------------------------------  IN: 1010 mL / OUT: 1200 mL / NET: -190 mL    22 @ 07:01  -  22 @ 14:06  --------------------------------------------------------  IN: 360 mL / OUT: 350 mL / NET: 10 mL        LABS:                        10.4   9.12  )-----------( 475      ( 19 Dec 2022 11:44 )             32.2         132<L>  |  99  |  12  ----------------------------<  153<H>  4.0   |  25  |  0.88    Ca    8.6      19 Dec 2022 11:44    TPro  8.0  /  Alb  3.0<L>  /  TBili  0.7  /  DBili  x   /  AST  36  /  ALT  40  /  AlkPhos  160<H>        Urinalysis Basic - ( 19 Dec 2022 04:15 )    Color: Yellow / Appearance: Clear / S.025 / pH: x  Gluc: x / Ketone: NEGATIVE  / Bili: Negative / Urobili: 2.0 E.U./dL   Blood: x / Protein: 30 mg/dL / Nitrite: NEGATIVE   Leuk Esterase: NEGATIVE / RBC: < 5 /HPF / WBC < 5 /HPF   Sq Epi: x / Non Sq Epi: 0-5 /HPF / Bacteria: Present /HPF      CAPILLARY BLOOD GLUCOSE            Urinalysis Basic - ( 19 Dec 2022 04:15 )    Color: Yellow / Appearance: Clear / S.025 / pH: x  Gluc: x / Ketone: NEGATIVE  / Bili: Negative / Urobili: 2.0 E.U./dL   Blood: x / Protein: 30 mg/dL / Nitrite: NEGATIVE   Leuk Esterase: NEGATIVE / RBC: < 5 /HPF / WBC < 5 /HPF   Sq Epi: x / Non Sq Epi: 0-5 /HPF / Bacteria: Present /HPF          Culture - Blood (collected 22 @ 22:49)  Source: .Blood Blood  Preliminary Report (22 @ 13:00):    No growth at 12 hours    Culture - Blood (collected 22 @ 22:49)  Source: .Blood Blood  Preliminary Report (22 @ 13:00):    No growth at 12 hours        RADIOLOGY & ADDITIONAL TESTS:    Imaging personally reviewed and radiologists report reviewed     Consultant(s) Notes Reviewed    PHYSICAL EXAM:  GENERAL: not in distress   HEAD, EYES: EOMI, PERRLA, conjunctiva and sclera clear  ENMT:  Moist mucous membranes, Good dentition, No lesions  NECK: Supple, No JVD, Normal thyroid  NERVOUS SYSTEM:  Alert & Oriented X3, Good concentration; Motor Strength 5/5 B/L upper and lower extremities; DTRs 2+ intact and symmetric  CHEST/LUNG: Clear to auscultation  bilaterally; No rales, rhonchi, wheezing,   HEART: Regular rate and rhythm; No murmurs, rubs, or gallops  ABDOMEN: Soft, Nontender, Nondistended; Bowel sounds present  EXTREMITIES:  2+ Peripheral Pulses, No clubbing, cyanosis, or edema  LYMPH: No lymphadenopathy noted  SKIN: No rashes or lesions    Care Discussed with Primary team  Patient is a 52y old  Male who presents with a chief complaint of septic L shoulder (19 Dec 2022 13:37)        HPI :  52 year old male admitted to the hospital for left shoulder pain  and swelling for 1 week . 2 weeks ago he had  flu shot to his left arm  . He had Fever , Sepsis , SVT at the time of admission , ID , Cardiology were consulted , patient received Adenosine and SVT transitioned to Sinus tachy. He is on BB and CCB daily , he is still having febrile episodes , he is on IV Vanc and zosyn , he underwent  Shoulder washout on .  Medicine was consulted today for medical comanagement      REVIEW OF SYSTEMS: 12 point review of systems negative except those stated else where in the note       PAST MEDICAL & SURGICAL HISTORY:  Obesity      No significant past surgical history          Social History:  no smoking, ETOH, or illicits (16 Dec 2022 12:24)      FAMILY HISTORY:  No pertinent family history in first degree relatives        Home Medications:      MEDICATIONS  (STANDING):  DAPTOmycin IVPB 900 milliGRAM(s) IV Intermittent every 24 hours  influenza   Vaccine 0.5 milliLiter(s) IntraMuscular once  metoprolol succinate ER 50 milliGRAM(s) Oral every 24 hours  NIFEdipine XL 30 milliGRAM(s) Oral every 24 hours  ondansetron Injectable 4 milliGRAM(s) IV Push once  sodium chloride 0.9%. 1000 milliLiter(s) (160 mL/Hr) IV Continuous <Continuous>    MEDICATIONS  (PRN):  acetaminophen     Tablet .. 975 milliGRAM(s) Oral every 8 hours PRN Temp greater or equal to 38C (100.4F)  benzocaine 15 mG/menthol 3.6 mG Lozenge 1 Lozenge Oral four times a day PRN Sore Throat  HYDROmorphone  Injectable 1 milliGRAM(s) IV Push every 4 hours PRN Severe Pain (7 - 10)  melatonin 5 milliGRAM(s) Oral at bedtime PRN Sleep  oxyCODONE    IR 5 milliGRAM(s) Oral every 4 hours PRN Moderate Pain (4 - 6)  oxyCODONE    IR 10 milliGRAM(s) Oral every 4 hours PRN Severe Pain (7 - 10)      Vital Signs Last 24 Hrs  T(C): 37.7 (19 Dec 2022 13:30), Max: 38.7 (18 Dec 2022 21:42)  T(F): 99.8 (19 Dec 2022 13:30), Max: 101.6 (18 Dec 2022 21:42)  HR: 108 (19 Dec 2022 13:30) (100 - 160)  BP: 153/90 (19 Dec 2022 13:30) (118/67 - 160/76)  BP(mean): --  RR: 16 (19 Dec 2022 13:30) (16 - 20)  SpO2: 100% (19 Dec 2022 13:30) (98% - 100%)      22 @ 07:01  -  22 @ 07:00  --------------------------------------------------------  IN: 1010 mL / OUT: 1200 mL / NET: -190 mL    22 @ 07:01  -  22 @ 14:06  --------------------------------------------------------  IN: 360 mL / OUT: 350 mL / NET: 10 mL        LABS:                        10.4   9.12  )-----------( 475      ( 19 Dec 2022 11:44 )             32.2         132<L>  |  99  |  12  ----------------------------<  153<H>  4.0   |  25  |  0.88    Ca    8.6      19 Dec 2022 11:44    TPro  8.0  /  Alb  3.0<L>  /  TBili  0.7  /  DBili  x   /  AST  36  /  ALT  40  /  AlkPhos  160<H>        Urinalysis Basic - ( 19 Dec 2022 04:15 )    Color: Yellow / Appearance: Clear / S.025 / pH: x  Gluc: x / Ketone: NEGATIVE  / Bili: Negative / Urobili: 2.0 E.U./dL   Blood: x / Protein: 30 mg/dL / Nitrite: NEGATIVE   Leuk Esterase: NEGATIVE / RBC: < 5 /HPF / WBC < 5 /HPF   Sq Epi: x / Non Sq Epi: 0-5 /HPF / Bacteria: Present /HPF      CAPILLARY BLOOD GLUCOSE            Urinalysis Basic - ( 19 Dec 2022 04:15 )    Color: Yellow / Appearance: Clear / S.025 / pH: x  Gluc: x / Ketone: NEGATIVE  / Bili: Negative / Urobili: 2.0 E.U./dL   Blood: x / Protein: 30 mg/dL / Nitrite: NEGATIVE   Leuk Esterase: NEGATIVE / RBC: < 5 /HPF / WBC < 5 /HPF   Sq Epi: x / Non Sq Epi: 0-5 /HPF / Bacteria: Present /HPF          Culture - Blood (collected 22 @ 22:49)  Source: .Blood Blood  Preliminary Report (22 @ 13:00):    No growth at 12 hours    Culture - Blood (collected 22 @ 22:49)  Source: .Blood Blood  Preliminary Report (22 @ 13:00):    No growth at 12 hours        RADIOLOGY & ADDITIONAL TESTS:    Imaging personally reviewed and radiologists report reviewed     Consultant(s) Notes Reviewed    PHYSICAL EXAM:  GENERAL: not in distress   HEAD, EYES: EOMI, PERRLA, conjunctiva and sclera clear  ENMT:  Moist mucous membranes, Good dentition, No lesions  NECK: Supple, No JVD, Normal thyroid  NERVOUS SYSTEM:  Alert & Oriented X3, Good concentration; Motor Strength 5/5 B/L upper and lower extremities; DTRs 2+ intact and symmetric  CHEST/LUNG: Clear to auscultation  bilaterally; No rales, rhonchi, wheezing,   HEART: Tachycardia  No murmurs, rubs, or gallops  ABDOMEN: Soft, Nontender, Nondistended; Bowel sounds present  EXTREMITIES:  Left Shoulder swollen and mobility restricted   LYMPH: No lymphadenopathy noted  SKIN: No rashes or lesions    Care Discussed with Primary team

## 2022-12-19 NOTE — OCCUPATIONAL THERAPY INITIAL EVALUATION ADULT - MODIFIED CLINICAL TEST OF SENSORY INTEGRATION IN BALANCE TEST
Pt able to ambulate ~15'x2 without AD with supervision, no LOB noted, HR tachy from 100s at rest to 120s with activity, ortho team and RN aware.

## 2022-12-19 NOTE — OCCUPATIONAL THERAPY INITIAL EVALUATION ADULT - LEVEL OF INDEPENDENCE: SUPINE/SIT, REHAB EVAL
HOB raised to 30 degrees, assist to bring trunk to upright position via R HHA/minimum assist (75% patients effort)

## 2022-12-19 NOTE — OCCUPATIONAL THERAPY INITIAL EVALUATION ADULT - PERTINENT HX OF CURRENT PROBLEM, REHAB EVAL
53 y/o male with obesity, no known significant PMHx (doesn't see doctors frequently), presented to [facility] ER 12/15 with atraumatic left shoulder pain and swelling x approximately 1 week, found to have septic L shoulder joint and was sent to [facility] ER for ortho admission, but in the ER was noted to be in SVT to 200s (resolved after adenosine 12mg IV x2 followed by Diltiazem 20mg IV x2), and admitted to cardiac tele for further management of SVT and eventual L shoulder washout with ortho.

## 2022-12-20 LAB
ANION GAP SERPL CALC-SCNC: 10 MMOL/L — SIGNIFICANT CHANGE UP (ref 5–17)
BUN SERPL-MCNC: 10 MG/DL — SIGNIFICANT CHANGE UP (ref 7–23)
CALCIUM SERPL-MCNC: 8.5 MG/DL — SIGNIFICANT CHANGE UP (ref 8.4–10.5)
CHLORIDE SERPL-SCNC: 100 MMOL/L — SIGNIFICANT CHANGE UP (ref 96–108)
CO2 SERPL-SCNC: 23 MMOL/L — SIGNIFICANT CHANGE UP (ref 22–31)
CREAT SERPL-MCNC: 0.75 MG/DL — SIGNIFICANT CHANGE UP (ref 0.5–1.3)
CULTURE RESULTS: SIGNIFICANT CHANGE UP
EGFR: 109 ML/MIN/1.73M2 — SIGNIFICANT CHANGE UP
GLUCOSE SERPL-MCNC: 104 MG/DL — HIGH (ref 70–99)
HCT VFR BLD CALC: 33.4 % — LOW (ref 39–50)
HGB BLD-MCNC: 10.7 G/DL — LOW (ref 13–17)
MCHC RBC-ENTMCNC: 27 PG — SIGNIFICANT CHANGE UP (ref 27–34)
MCHC RBC-ENTMCNC: 32 GM/DL — SIGNIFICANT CHANGE UP (ref 32–36)
MCV RBC AUTO: 84.1 FL — SIGNIFICANT CHANGE UP (ref 80–100)
NRBC # BLD: 0 /100 WBCS — SIGNIFICANT CHANGE UP (ref 0–0)
OSMOLALITY UR: 501 MOSM/KG — SIGNIFICANT CHANGE UP (ref 300–900)
PLATELET # BLD AUTO: 435 K/UL — HIGH (ref 150–400)
POTASSIUM SERPL-MCNC: 4.4 MMOL/L — SIGNIFICANT CHANGE UP (ref 3.5–5.3)
POTASSIUM SERPL-SCNC: 4.4 MMOL/L — SIGNIFICANT CHANGE UP (ref 3.5–5.3)
RBC # BLD: 3.97 M/UL — LOW (ref 4.2–5.8)
RBC # FLD: 15.7 % — HIGH (ref 10.3–14.5)
SODIUM SERPL-SCNC: 133 MMOL/L — LOW (ref 135–145)
SODIUM UR-SCNC: 126 MMOL/L — SIGNIFICANT CHANGE UP
SPECIMEN SOURCE: SIGNIFICANT CHANGE UP
WBC # BLD: 8.39 K/UL — SIGNIFICANT CHANGE UP (ref 3.8–10.5)
WBC # FLD AUTO: 8.39 K/UL — SIGNIFICANT CHANGE UP (ref 3.8–10.5)

## 2022-12-20 PROCEDURE — 99233 SBSQ HOSP IP/OBS HIGH 50: CPT

## 2022-12-20 PROCEDURE — 99232 SBSQ HOSP IP/OBS MODERATE 35: CPT

## 2022-12-20 RX ORDER — IBUPROFEN 200 MG
600 TABLET ORAL EVERY 8 HOURS
Refills: 0 | Status: DISCONTINUED | OUTPATIENT
Start: 2022-12-20 | End: 2022-12-21

## 2022-12-20 RX ORDER — NIFEDIPINE 30 MG
30 TABLET, EXTENDED RELEASE 24 HR ORAL ONCE
Refills: 0 | Status: COMPLETED | OUTPATIENT
Start: 2022-12-20 | End: 2022-12-20

## 2022-12-20 RX ORDER — NIFEDIPINE 30 MG
60 TABLET, EXTENDED RELEASE 24 HR ORAL DAILY
Refills: 0 | Status: DISCONTINUED | OUTPATIENT
Start: 2022-12-21 | End: 2022-12-27

## 2022-12-20 RX ADMIN — Medication 30 MILLIGRAM(S): at 16:17

## 2022-12-20 RX ADMIN — OXYCODONE HYDROCHLORIDE 10 MILLIGRAM(S): 5 TABLET ORAL at 11:25

## 2022-12-20 RX ADMIN — OXYCODONE HYDROCHLORIDE 10 MILLIGRAM(S): 5 TABLET ORAL at 01:40

## 2022-12-20 RX ADMIN — SODIUM CHLORIDE 160 MILLILITER(S): 9 INJECTION INTRAMUSCULAR; INTRAVENOUS; SUBCUTANEOUS at 20:52

## 2022-12-20 RX ADMIN — OXYCODONE HYDROCHLORIDE 10 MILLIGRAM(S): 5 TABLET ORAL at 05:45

## 2022-12-20 RX ADMIN — OXYCODONE HYDROCHLORIDE 10 MILLIGRAM(S): 5 TABLET ORAL at 01:02

## 2022-12-20 RX ADMIN — Medication 600 MILLIGRAM(S): at 18:00

## 2022-12-20 RX ADMIN — OXYCODONE HYDROCHLORIDE 10 MILLIGRAM(S): 5 TABLET ORAL at 10:24

## 2022-12-20 RX ADMIN — DAPTOMYCIN 136 MILLIGRAM(S): 500 INJECTION, POWDER, LYOPHILIZED, FOR SOLUTION INTRAVENOUS at 22:07

## 2022-12-20 RX ADMIN — OXYCODONE HYDROCHLORIDE 10 MILLIGRAM(S): 5 TABLET ORAL at 06:30

## 2022-12-20 RX ADMIN — Medication 975 MILLIGRAM(S): at 10:24

## 2022-12-20 RX ADMIN — Medication 600 MILLIGRAM(S): at 16:59

## 2022-12-20 RX ADMIN — Medication 975 MILLIGRAM(S): at 11:25

## 2022-12-20 RX ADMIN — Medication 30 MILLIGRAM(S): at 12:12

## 2022-12-20 RX ADMIN — SODIUM CHLORIDE 160 MILLILITER(S): 9 INJECTION INTRAMUSCULAR; INTRAVENOUS; SUBCUTANEOUS at 05:57

## 2022-12-20 RX ADMIN — Medication 50 MILLIGRAM(S): at 12:12

## 2022-12-20 NOTE — PROGRESS NOTE ADULT - ASSESSMENT
52 year old male admitted for septic joint     Impression and Plan   # Sepsis   # Septic Left shoulder Joint s/p Washout 12/17   - IV Daptomycin , MRSA in Culture from OR , patient still febrile   - Follow Blood cx   - tyelenol  and motrin for fever  - Recommend venous duplex to r/o DVT LUE     # SVT   -s/p adenosine , continue metoprolol xl and nifedipine     # Hyponatremia   - Check Urine Na , Urine Osm     # DVT prophylaxis

## 2022-12-20 NOTE — PROGRESS NOTE ADULT - SUBJECTIVE AND OBJECTIVE BOX
Ortho Progress Note    Procedure: L shoulder arthroscopic washout  Surgeon: Sulma    Pt comfortable without complaints, pain controlled. Tmax 101.3 yesterday 0900    Denies CP, SOB, N/V, numbness/tingling     Vital Signs Last 24 Hrs  T(C): 37.5 (20 Dec 2022 05:51), Max: 38.5 (19 Dec 2022 09:00)  T(F): 99.5 (20 Dec 2022 05:51), Max: 101.3 (19 Dec 2022 09:00)  HR: 108 (20 Dec 2022 05:51) (104 - 160)  BP: 141/74 (20 Dec 2022 05:51) (133/84 - 160/76)  BP(mean): --  RR: 18 (20 Dec 2022 05:51) (16 - 20)  SpO2: 100% (20 Dec 2022 05:51) (96% - 100%)    Parameters below as of 20 Dec 2022 05:51  Patient On (Oxygen Delivery Method): room air        Physical Exam:  General: Pt Alert and oriented, NAD  L shoulder DSG C/D/I  Pulses: 2+ radial pulses, wwp, cap refill <3 sec  Sensation: SILT in axillary/radial/median/ulnar distributions  Motor: axillary/AIN/PIN/ulnar firing. Pain with FF to 40                          10.7   8.39  )-----------( 435      ( 20 Dec 2022 06:11 )             33.4   Culture - Abscess with Gram Stain (12.17.22 @ 09:23)    Gram Stain:   No organisms seen  Moderate White blood cells    -  Clindamycin: S 0.5    -  Daptomycin: S 1    -  Erythromycin: S <=0.25    -  Linezolid: S 2    -  Oxacillin: R >2    -  Rifampin: S <=1 Should not be used as monotherapy    -  Tetracycline: R >8    -  Trimethoprim/Sulfamethoxazole: S 2/38    -  Vancomycin: S 2    -  Vancomycin: S 1.5    Specimen Source: .Abscess 3. Left shoulder abscess    Culture Results:   Rare Methicillin Resistant Staphylococcus aureus  Result called to and read back by_ Ms. DINO Mckenzie RN  12/18/2022 15:12:12    Organism Identification: Methicillin resistant Staphylococcus aureus  Methicillin resistant Staphylococcus aureus    Organism: Methicillin resistant Staphylococcus aureus    Organism: Methicillin resistant Staphylococcus aureus    Method Type: ETEST    Method Type: GREGOR        A/P: 52yMale s/p L shoulder arthroscopic washout  - Stable  - Pain Control  - f/u AM labs  - DVT ppx: SCDs  - Post op abx: dapto  - ID consult  - PT, WBS: WBAT  - Dispo: pending ID recs

## 2022-12-20 NOTE — PROGRESS NOTE ADULT - SUBJECTIVE AND OBJECTIVE BOX
Patient is a 52y old  Male who presents with a chief complaint of septic L shoulder (20 Dec 2022 12:14)        SUBJECTIVE:  Patient was seen and examined at bedside.    Overnight Events : still having intermittent fevers , complaints of left shoulder and arm pain       Review of systems: 12 point Review of systems negative unless otherwise documented elsewhere in note.     Diet, DASH/TLC:   Sodium & Cholesterol Restricted (22 @ 17:32) [Active]      MEDICATIONS:  MEDICATIONS  (STANDING):  DAPTOmycin IVPB 900 milliGRAM(s) IV Intermittent every 24 hours  influenza   Vaccine 0.5 milliLiter(s) IntraMuscular once  metoprolol succinate ER 50 milliGRAM(s) Oral every 24 hours  NIFEdipine XL 30 milliGRAM(s) Oral every 24 hours  ondansetron Injectable 4 milliGRAM(s) IV Push once  sodium chloride 0.9%. 1000 milliLiter(s) (160 mL/Hr) IV Continuous <Continuous>    MEDICATIONS  (PRN):  acetaminophen     Tablet .. 975 milliGRAM(s) Oral every 8 hours PRN Temp greater or equal to 38C (100.4F)  benzocaine 15 mG/menthol 3.6 mG Lozenge 1 Lozenge Oral four times a day PRN Sore Throat  HYDROmorphone  Injectable 1 milliGRAM(s) IV Push every 4 hours PRN Severe Pain (7 - 10)  ibuprofen  Tablet. 600 milliGRAM(s) Oral every 8 hours PRN Temp greater or equal to 38C (100.4F)  melatonin 5 milliGRAM(s) Oral at bedtime PRN Sleep  oxyCODONE    IR 5 milliGRAM(s) Oral every 4 hours PRN Moderate Pain (4 - 6)  oxyCODONE    IR 10 milliGRAM(s) Oral every 4 hours PRN Severe Pain (7 - 10)      Allergies    No Known Allergies    Intolerances        OBJECTIVE:  Vital Signs Last 24 Hrs  T(C): 37.4 (20 Dec 2022 12:11), Max: 38.2 (19 Dec 2022 23:00)  T(F): 99.4 (20 Dec 2022 12:11), Max: 100.7 (19 Dec 2022 23:00)  HR: 99 (20 Dec 2022 12:11) (99 - 117)  BP: 133/76 (20 Dec 2022 12:11) (133/76 - 161/87)  BP(mean): --  RR: 18 (20 Dec 2022 12:11) (16 - 18)  SpO2: 100% (20 Dec 2022 12:11) (96% - 100%)    Parameters below as of 20 Dec 2022 12:11  Patient On (Oxygen Delivery Method): room air      I&O's Summary    19 Dec 2022 07:01  -  20 Dec 2022 07:00  --------------------------------------------------------  IN: 2330 mL / OUT: 3330 mL / NET: -1000 mL    20 Dec 2022 07:01  -  20 Dec 2022 13:46  --------------------------------------------------------  IN: 160 mL / OUT: 0 mL / NET: 160 mL        PHYSICAL EXAM:  Gen: Resting in bed at time of exam, not in distress   HEENT: moist mucosa, no lesions   Neck: supple, trachea at midline  CV: RRR, +S1/S2  Pulm: no wheezing , no crackles  no increase in work of breathing  Abd: soft, NTND  Skin: warm and dry, no new rashes   Ext: left shoulder , arm , fore arm  ,wrist and hand swollen, warm , tender , Range of motion limited due to pain   Neuro: AOx3, no gross focal neurological deficits  Psych: affect and behavior appropriate, pleasant at time of interview    LABS:                        10.7   8.39  )-----------( 435      ( 20 Dec 2022 06:11 )             33.4     12-20    133<L>  |  100  |  10  ----------------------------<  104<H>  4.4   |  23  |  0.75    Ca    8.5      20 Dec 2022 06:11    TPro  8.0  /  Alb  3.0<L>  /  TBili  0.7  /  DBili  x   /  AST  36  /  ALT  40  /  AlkPhos  160<H>  12-19    LIVER FUNCTIONS - ( 19 Dec 2022 11:44 )  Alb: 3.0 g/dL / Pro: 8.0 g/dL / ALK PHOS: 160 U/L / ALT: 40 U/L / AST: 36 U/L / GGT: x             CAPILLARY BLOOD GLUCOSE        Urinalysis Basic - ( 19 Dec 2022 04:15 )    Color: Yellow / Appearance: Clear / S.025 / pH: x  Gluc: x / Ketone: NEGATIVE  / Bili: Negative / Urobili: 2.0 E.U./dL   Blood: x / Protein: 30 mg/dL / Nitrite: NEGATIVE   Leuk Esterase: NEGATIVE / RBC: < 5 /HPF / WBC < 5 /HPF   Sq Epi: x / Non Sq Epi: 0-5 /HPF / Bacteria: Present /HPF        MICRODATA:    Culture - Blood (collected 18 Dec 2022 22:49)  Source: .Blood Blood  Preliminary Report (20 Dec 2022 01:00):    No growth at 1 day.    Culture - Blood (collected 18 Dec 2022 22:49)  Source: .Blood Blood  Preliminary Report (20 Dec 2022 01:00):    No growth at 1 day.        RADIOLOGY/OTHER STUDIES:

## 2022-12-20 NOTE — PROGRESS NOTE ADULT - SUBJECTIVE AND OBJECTIVE BOX
INTERVAL EVENTS: Overall fever curve coming down on daptomycin. Denies any chest pain/ SOB or palpitations.     PAST MEDICAL & SURGICAL HISTORY:  No pertinent past medical history    Obesity    No significant past surgical history        MEDICATIONS  (STANDING):  DAPTOmycin IVPB 900 milliGRAM(s) IV Intermittent every 24 hours  influenza   Vaccine 0.5 milliLiter(s) IntraMuscular once  metoprolol succinate ER 50 milliGRAM(s) Oral every 24 hours  NIFEdipine XL 30 milliGRAM(s) Oral every 24 hours  ondansetron Injectable 4 milliGRAM(s) IV Push once  sodium chloride 0.9%. 1000 milliLiter(s) (160 mL/Hr) IV Continuous <Continuous>    MEDICATIONS  (PRN):  acetaminophen     Tablet .. 975 milliGRAM(s) Oral every 8 hours PRN Temp greater or equal to 38C (100.4F)  benzocaine 15 mG/menthol 3.6 mG Lozenge 1 Lozenge Oral four times a day PRN Sore Throat  HYDROmorphone  Injectable 1 milliGRAM(s) IV Push every 4 hours PRN Severe Pain (7 - 10)  melatonin 5 milliGRAM(s) Oral at bedtime PRN Sleep  oxyCODONE    IR 10 milliGRAM(s) Oral every 4 hours PRN Severe Pain (7 - 10)  oxyCODONE    IR 5 milliGRAM(s) Oral every 4 hours PRN Moderate Pain (4 - 6)    Vital Signs Last 24 Hrs  T(C): 38 (20 Dec 2022 09:11), Max: 38.2 (19 Dec 2022 23:00)  T(F): 100.4 (20 Dec 2022 09:11), Max: 100.7 (19 Dec 2022 23:00)  HR: 110 (20 Dec 2022 09:11) (104 - 117)  BP: 161/87 (20 Dec 2022 09:11) (133/84 - 161/87)  BP(mean): --  RR: 18 (20 Dec 2022 09:11) (16 - 18)  SpO2: 100% (20 Dec 2022 09:11) (96% - 100%)    Parameters below as of 20 Dec 2022 09:11  Patient On (Oxygen Delivery Method): room air        PHYSICAL EXAM:  GEN: Awake, comfortable. NAD.   HEENT: NCAT, PERRL, EOMI. Mucosa moist. No JVD.   RESP: CTA b/l  CV: tachycardic, normal s1/s2. No m/r/g.  ABD: Soft, NTND. BS+  EXT: Warm. No edema, clubbing, or cyanosis.   NEURO: AAOx3. No focal deficits.    LABS:                        10.7   8.39  )-----------( 435      ( 20 Dec 2022 06:11 )             33.4     12-20    133<L>  |  100  |  10  ----------------------------<  104<H>  4.4   |  23  |  0.75    Ca    8.5      20 Dec 2022 06:11    TPro  8.0  /  Alb  3.0<L>  /  TBili  0.7  /  DBili  x   /  AST  36  /  ALT  40  /  AlkPhos  160<H>  12-    CARDIAC MARKERS ( 19 Dec 2022 05:30 )  x     / x     / 232 U/L / x     / x            Urinalysis Basic - ( 19 Dec 2022 04:15 )    Color: Yellow / Appearance: Clear / S.025 / pH: x  Gluc: x / Ketone: NEGATIVE  / Bili: Negative / Urobili: 2.0 E.U./dL   Blood: x / Protein: 30 mg/dL / Nitrite: NEGATIVE   Leuk Esterase: NEGATIVE / RBC: < 5 /HPF / WBC < 5 /HPF   Sq Epi: x / Non Sq Epi: 0-5 /HPF / Bacteria: Present /HPF      I&O's Summary    19 Dec 2022 07:  -  20 Dec 2022 07:00  --------------------------------------------------------  IN: 2330 mL / OUT: 3330 mL / NET: -1000 mL    20 Dec 2022 07:01  -  20 Dec 2022 10:19  --------------------------------------------------------  IN: 160 mL / OUT: 0 mL / NET: 160 mL        < from: TTE Echo Complete w/ Contrast w/ Doppler (16. @ 15:22) >  CONCLUSIONS:     1. Technically difficult study. Very limited exam-tech notes patient is   sitting up during imaging.   2. Mild symmetric left ventricular hypertrophy.   3. Normal LV systolic function.   4. The right ventricle is not well visualized.   5. Limited valvular evaluation.   6. Trivial pericardial effusion.    < end of copied text >

## 2022-12-20 NOTE — PROGRESS NOTE ADULT - ASSESSMENT
51 y/o male with obesity, no known significant PMHx presented to [facility] ER 12/15 with atraumatic left shoulder pain and swelling x approximately 1 week, found to have septic L shoulder joint but in the ER was noted to be in SVT to 200s, admitted to cardiac tele for further management of SVT. Patient was then moved to Orthopedic service for washout on 12/17. Cardiology following for SVT.     #SVT  Patient with no past history of SVT. 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 Was seen by EP and planned for possible outpatient ablation   - ECG: Repeat ECG with normal sinus rhythm  - Tele this AM:   - TTE: Normal LV fx, mild LVH, trivial effusion  - Sinus tachycardia in the setting of fever. Would control underlying etiology.   - c/w Toprol 50mg qd for now     #HTN   - Started on nifedipine 30mg qd this admission  - Please increase nifedipine to     INCOMPLETE  53 y/o male with obesity, no known significant PMHx presented to [facility] ER 12/15 with atraumatic left shoulder pain and swelling x approximately 1 week, found to have septic L shoulder joint but in the ER was noted to be in SVT to 200s, admitted to cardiac tele for further management of SVT. Patient was then moved to Orthopedic service for washout on 12/17. Cardiology following for SVT.     #SVT  Patient with no past history of SVT. 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 Was seen by EP and planned for possible outpatient ablation   - ECG: Repeat ECG with normal sinus rhythm  - Tele this AM: sinus tachycardia upto 130's   - TTE: Normal LV fx, mild LVH, trivial effusion  - Sinus tachycardia in the setting of fever. Would control underlying etiology.   - c/w Toprol 50mg qd for now     #HTN   - Started on nifedipine 30mg qd this admission  - Please increase nifedipine to 60mg qd     Cardiology to follow  51 y/o male with obesity, no known significant PMHx presented to [facility] ER 12/15 with atraumatic left shoulder pain and swelling x approximately 1 week, found to have septic L shoulder joint but in the ER was noted to be in SVT to 200s, admitted to cardiac tele for further management of SVT. Patient was then moved to Orthopedic service for washout on 12/17. Cardiology following for SVT.     #SVT  Patient with no past history of SVT. 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 Was seen by EP and planned for possible outpatient ablation   - ECG: Repeat ECG with normal sinus rhythm  - Tele this AM: sinus tachycardia upto 130's   - TTE: Normal LV fx, mild LVH, trivial effusion  - Sinus tachycardia in the setting of fever. Would control underlying etiology.   - c/w Toprol 50mg qd for now     #HTN   - Started on nifedipine 30mg qd this admission  - Please increase nifedipine to 60mg qd     Cardiology to sign off. Please make an appointment with EP upon discharge.

## 2022-12-20 NOTE — PROGRESS NOTE ADULT - ASSESSMENT
52M h/o obesity p/w L shoulder pain and swelling x 1 week, found to have L shoulder septic arthritis.  Now s/p washout on 12/17.  OR culture growing MRSA.  Still intermittently febrile, although fever curve downtrending and WBC normal.       - cont daptomycin 900mg IV q24h  - Place single lumen PICC line once afebrile >24h  - Duration of antibiotics is 4 weeks ( 12/18/22 - 1/14/23 ) to treat septic arthritis of L shoulder   - Weekly labs: CMP, CBC, ESR, CRP, CK faxed to ID office at [phone]  - Patient to follow up with Dr. rojas in 2 weeks ([address] [place] [address], [phone]), ID office will call patient to schedule       Team 2 will follow you.  Case d/w primary team.    Digna Rojas MD, MS  Infectious Disease attending  work cell [phone]   For any questions during evening/weekend/holiday, please page ID on call        52M h/o obesity p/w L shoulder pain and swelling x 1 week, found to have L shoulder septic arthritis.  Now s/p washout on 12/17.  OR culture growing MRSA.  Still intermittently febrile, although fever curve downtrending and WBC normal.  His entire L arm is also very swollen - recommend r/o DVT.     - cont daptomycin 900mg IV q24h  - obtain LUE Duplex to r/o DVT   - Place single lumen PICC line once afebrile >24h  - Duration of antibiotics is 4 weeks ( 12/18/22 - 1/14/23 ) to treat septic arthritis of L shoulder   - Weekly labs: CMP, CBC, ESR, CRP, CK faxed to ID office at [phone]  - Patient to follow up with Dr. rojas in 2 weeks ([address] [place] [address], [phone]), ID office will call patient to schedule       Team 2 will follow you.  Case d/w primary team.    Digna Rojas MD, MS  Infectious Disease attending  work cell [phone]   For any questions during evening/weekend/holiday, please page ID on call

## 2022-12-20 NOTE — PROGRESS NOTE ADULT - SUBJECTIVE AND OBJECTIVE BOX
INFECTIOUS DISEASES CONSULT FOLLOW-UP NOTE    INTERVAL HPI/OVERNIGHT EVENTS:  febrile to 100.4  reports improving L shoulder pain  L arm significantly swollen   denied n/v/d/ abdominal pain     ROS:   Constitutional, eyes, ENT, cardiovascular, respiratory, gastrointestinal, genitourinary, integumentary, neurological, psychiatric and heme/lymph are otherwise negative other than noted above       ANTIBIOTICS/RELEVANT:    MEDICATIONS  (STANDING):  DAPTOmycin IVPB 900 milliGRAM(s) IV Intermittent every 24 hours  influenza   Vaccine 0.5 milliLiter(s) IntraMuscular once  metoprolol succinate ER 50 milliGRAM(s) Oral every 24 hours  NIFEdipine XL 30 milliGRAM(s) Oral every 24 hours  ondansetron Injectable 4 milliGRAM(s) IV Push once  sodium chloride 0.9%. 1000 milliLiter(s) (160 mL/Hr) IV Continuous <Continuous>    MEDICATIONS  (PRN):  acetaminophen     Tablet .. 975 milliGRAM(s) Oral every 8 hours PRN Temp greater or equal to 38C (100.4F)  benzocaine 15 mG/menthol 3.6 mG Lozenge 1 Lozenge Oral four times a day PRN Sore Throat  HYDROmorphone  Injectable 1 milliGRAM(s) IV Push every 4 hours PRN Severe Pain (7 - 10)  melatonin 5 milliGRAM(s) Oral at bedtime PRN Sleep  oxyCODONE    IR 5 milliGRAM(s) Oral every 4 hours PRN Moderate Pain (4 - 6)  oxyCODONE    IR 10 milliGRAM(s) Oral every 4 hours PRN Severe Pain (7 - 10)        Vital Signs Last 24 Hrs  T(C): 38 (20 Dec 2022 09:11), Max: 38.2 (19 Dec 2022 23:00)  T(F): 100.4 (20 Dec 2022 09:11), Max: 100.7 (19 Dec 2022 23:00)  HR: 110 (20 Dec 2022 09:11) (104 - 117)  BP: 161/87 (20 Dec 2022 09:11) (137/74 - 161/87)  BP(mean): --  RR: 18 (20 Dec 2022 09:11) (16 - 18)  SpO2: 100% (20 Dec 2022 09:11) (96% - 100%)    Parameters below as of 20 Dec 2022 09:11  Patient On (Oxygen Delivery Method): room air        22 @ 07:01  -  22 @ 07:00  --------------------------------------------------------  IN: 2330 mL / OUT: 3330 mL / NET: -1000 mL    22 @ 07:01  -  22 @ 11:31  --------------------------------------------------------  IN: 160 mL / OUT: 0 mL / NET: 160 mL      PHYSICAL EXAM:  Constitutional: alert, NAD  Eyes: the sclera and conjunctiva were normal.   ENT: the ears and nose were normal in appearance.   Neck: the appearance of the neck was normal and the neck was supple.   Pulmonary: no respiratory distress and lungs were clear to auscultation bilaterally.   Heart: heart rate was normal and rhythm regular, normal S1 and S2  Vascular:. there was no peripheral edema  Abdomen: normal bowel sounds, soft, non-tender  Neurological: no focal deficits.   Ext: L shoulder covered with dressing           LABS:                        10.7   8.39  )-----------( 435      ( 20 Dec 2022 06:11 )             33.4     12-    133<L>  |  100  |  10  ----------------------------<  104<H>  4.4   |  23  |  0.75    Ca    8.5      20 Dec 2022 06:11    TPro  8.0  /  Alb  3.0<L>  /  TBili  0.7  /  DBili  x   /  AST  36  /  ALT  40  /  AlkPhos  160<H>  12      Urinalysis Basic - ( 19 Dec 2022 04:15 )    Color: Yellow / Appearance: Clear / S.025 / pH: x  Gluc: x / Ketone: NEGATIVE  / Bili: Negative / Urobili: 2.0 E.U./dL   Blood: x / Protein: 30 mg/dL / Nitrite: NEGATIVE   Leuk Esterase: NEGATIVE / RBC: < 5 /HPF / WBC < 5 /HPF   Sq Epi: x / Non Sq Epi: 0-5 /HPF / Bacteria: Present /HPF        MICROBIOLOGY:   BCx ngtd x 2   L shoulder abscess AFB: p x 3   L shoulder abscess fungal: p x 3   L shoulder abscess 1: MRSA   L shoulder abscess 2: p   L shoulder abscess 3: MRSA (S to vanc, clinda, dapto, linez, rif, bact)  12/15 BCx ngtd x 2  12/15 BCx ngtd x 2      RADIOLOGY & ADDITIONAL STUDIES:  CT shoulder L 12/15  IMPRESSION:    Nonspecific large glenohumeral joint effusion. If there is clinical   concern for septic arthritis, joint aspiration should be performed.    Foci of soft tissue gas within the lateral deltoid muscle, which may be   related to a gas-forming/necrotizing infection or be related to recent   injection. Clinical correlation recommended.    No acute displaced fracture. Inferior subluxation of the humeral head at   the glenohumeral joint, likely related to the patient's large   glenohumeral joint effusion.    Indeterminate hypoattenuating nodule within the right lobe of the   thyroid. Recommend further evaluation with nonemergent thyroid ultrasound

## 2022-12-20 NOTE — PROGRESS NOTE ADULT - SUBJECTIVE AND OBJECTIVE BOX
Pt seen and examined at bedside. At 24:00 endorses sudden onset of decreased sensation of left forearm. Denies pain. Pt states ibuprofen controls fevers he has had in the past at home. Pain well controlled. Tmax 101.7F.     Vital Signs Last 24 Hrs  T(C): 38 (12-20-22 @ 09:11), Max: 38 (12-20-22 @ 09:11)  T(F): 100.4 (12-20-22 @ 09:11), Max: 100.4 (12-20-22 @ 09:11)  HR: 110 (12-20-22 @ 09:11) (110 - 110)  BP: 161/87 (12-20-22 @ 09:11) (161/87 - 161/87)  BP(mean): --  RR: 18 (12-20-22 @ 09:11) (18 - 18)  SpO2: 100% (12-20-22 @ 09:11) (100% - 100%)  I&O's Summary    19 Dec 2022 07:01  -  20 Dec 2022 07:00  --------------------------------------------------------  IN: 2330 mL / OUT: 3330 mL / NET: -1000 mL    20 Dec 2022 07:01  -  20 Dec 2022 12:17  --------------------------------------------------------  IN: 160 mL / OUT: 0 mL / NET: 160 mL        General: Pt Alert and oriented, NAD  DSG C/D/I- tape  Skin: warm to touch, +mild swelling of left upper extremity  Pulses: radial pulse 2+ bilaterally  Sensation: decreased sensation over lateral aspect of left forearm,   Motor: 5/5 bilateral upper extremities                            10.7   8.39  )-----------( 435      ( 20 Dec 2022 06:11 )             33.4     12-20    133<L>  |  100  |  10  ----------------------------<  104<H>  4.4   |  23  |  0.75    Ca    8.5      20 Dec 2022 06:11    TPro  8.0  /  Alb  3.0<L>  /  TBili  0.7  /  DBili  x   /  AST  36  /  ALT  40  /  AlkPhos  160<H>  12-19      A/P: 52yMale POD4 left shoulder washout, with ongoing fever workup, with tachycardia overnight  - Fever: continue tylenol for fever; +/- ibuprofen, follow up blood cultures, follow up repeat labs  - Appreciate Cardio recs, will continue nifedipine and toprol; control fever  -Appreciate ID recs, will continue dapto; PICC to be placed when afebrile >24hrs, abx until 1/14/23  - Stable  - Pain Control  - DVT ppx: SCDs  - PT, WBS: WBAT  - Dispo: pending fever/infection optimization    Ortho Pager [phone] Pt seen and examined at bedside. At 24:00 endorses sudden onset of decreased sensation of left forearm. Denies pain. Pt states ibuprofen controls fevers he has had in the past at home. Pain well controlled. Tmax 101.7F.     Vital Signs Last 24 Hrs  T(C): 38 (12-20-22 @ 09:11), Max: 38 (12-20-22 @ 09:11)  T(F): 100.4 (12-20-22 @ 09:11), Max: 100.4 (12-20-22 @ 09:11)  HR: 110 (12-20-22 @ 09:11) (110 - 110)  BP: 161/87 (12-20-22 @ 09:11) (161/87 - 161/87)  BP(mean): --  RR: 18 (12-20-22 @ 09:11) (18 - 18)  SpO2: 100% (12-20-22 @ 09:11) (100% - 100%)  I&O's Summary    19 Dec 2022 07:01  -  20 Dec 2022 07:00  --------------------------------------------------------  IN: 2330 mL / OUT: 3330 mL / NET: -1000 mL    20 Dec 2022 07:01  -  20 Dec 2022 12:17  --------------------------------------------------------  IN: 160 mL / OUT: 0 mL / NET: 160 mL        General: Pt Alert and oriented, NAD  DSG C/D/I- tape  Skin: warm to touch, +mild swelling of left upper extremity  Pulses: radial pulse 2+ bilaterally  Sensation: decreased sensation over lateral aspect of left forearm,   Motor: 5/5 bilateral upper extremities                            10.7   8.39  )-----------( 435      ( 20 Dec 2022 06:11 )             33.4     12-20    133<L>  |  100  |  10  ----------------------------<  104<H>  4.4   |  23  |  0.75    Ca    8.5      20 Dec 2022 06:11    TPro  8.0  /  Alb  3.0<L>  /  TBili  0.7  /  DBili  x   /  AST  36  /  ALT  40  /  AlkPhos  160<H>  12-19      A/P: 52yMale POD4 left shoulder washout, with ongoing fever workup, with tachycardia overnight  - Fever: continue tylenol for fever; +/- ibuprofen, follow up blood cultures, follow up repeat labs  - Appreciate Cardio recs, will continue nifedipine and toprol; control fever  -Appreciate ID recs, will continue dapto; PICC to be placed when afebrile >24hrs, abx until 1/14/23  - Left upper extremity Doppler US ordered  - Stable  - Pain Control  - DVT ppx: SCDs  - PT, WBS: WBAT  - Dispo: pending fever/infection optimization    Ortho Pager [phone] Pt seen and examined at bedside.  Pt complaining of numbness to lateral left forearm secondary to swelling. Denies pain. Pt states ibuprofen controls fevers he has had in the past at home. Pain well controlled.     Vital Signs Last 24 Hrs  T(C): 38 (12-20-22 @ 09:11), Max: 38 (12-20-22 @ 09:11)  T(F): 100.4 (12-20-22 @ 09:11), Max: 100.4 (12-20-22 @ 09:11)  HR: 110 (12-20-22 @ 09:11) (110 - 110)  BP: 161/87 (12-20-22 @ 09:11) (161/87 - 161/87)  BP(mean): --  RR: 18 (12-20-22 @ 09:11) (18 - 18)  SpO2: 100% (12-20-22 @ 09:11) (100% - 100%)  I&O's Summary    19 Dec 2022 07:01  -  20 Dec 2022 07:00  --------------------------------------------------------  IN: 2330 mL / OUT: 3330 mL / NET: -1000 mL    20 Dec 2022 07:01  -  20 Dec 2022 12:17  --------------------------------------------------------  IN: 160 mL / OUT: 0 mL / NET: 160 mL        General: Pt Alert and oriented, NAD  DSG C/D/I- tape  Skin: warm to touch, diffuse swelling of left upper extremity  Pulses: radial pulse 2+ bilaterally, cap refill <3sec  Sensation: decreased sensation over lateral aspect of left forearm,   Motor: 5/5 bilateral upper extremities                            10.7   8.39  )-----------( 435      ( 20 Dec 2022 06:11 )             33.4     12-20    133<L>  |  100  |  10  ----------------------------<  104<H>  4.4   |  23  |  0.75    Ca    8.5      20 Dec 2022 06:11    TPro  8.0  /  Alb  3.0<L>  /  TBili  0.7  /  DBili  x   /  AST  36  /  ALT  40  /  AlkPhos  160<H>  12-19      A/P: 52yMale POD4 left shoulder washout, with ongoing fever workup, with tachycardia overnight  - Fever: continue tylenol for fever; ibuprofen added for second line fever control, follow up blood cultures  - Appreciate Cardio recs, will continue nifedipine and toprol; control fever  -Appreciate ID recs, will continue dapto; PICC to be placed when afebrile >24hrs, abx until 1/14/23  - Follow up LUE doppler  - Stable  - Pain Control  - DVT ppx: SCDs  - PT, WBS: WBAT  - Dispo: pending fever/infection optimization    Ortho Pager [phone]

## 2022-12-21 LAB
ANION GAP SERPL CALC-SCNC: 9 MMOL/L — SIGNIFICANT CHANGE UP (ref 5–17)
BASOPHILS # BLD AUTO: 0.03 K/UL — SIGNIFICANT CHANGE UP (ref 0–0.2)
BASOPHILS NFR BLD AUTO: 0.4 % — SIGNIFICANT CHANGE UP (ref 0–2)
BUN SERPL-MCNC: 10 MG/DL — SIGNIFICANT CHANGE UP (ref 7–23)
CALCIUM SERPL-MCNC: 8.2 MG/DL — LOW (ref 8.4–10.5)
CHLORIDE SERPL-SCNC: 102 MMOL/L — SIGNIFICANT CHANGE UP (ref 96–108)
CO2 SERPL-SCNC: 23 MMOL/L — SIGNIFICANT CHANGE UP (ref 22–31)
CREAT SERPL-MCNC: 0.77 MG/DL — SIGNIFICANT CHANGE UP (ref 0.5–1.3)
CRP SERPL-MCNC: 236.8 MG/L — HIGH (ref 0–4)
CULTURE RESULTS: SIGNIFICANT CHANGE UP
EGFR: 108 ML/MIN/1.73M2 — SIGNIFICANT CHANGE UP
EOSINOPHIL # BLD AUTO: 0.12 K/UL — SIGNIFICANT CHANGE UP (ref 0–0.5)
EOSINOPHIL NFR BLD AUTO: 1.6 % — SIGNIFICANT CHANGE UP (ref 0–6)
ERYTHROCYTE [SEDIMENTATION RATE] IN BLOOD: >130 MM/HR — HIGH
GLUCOSE SERPL-MCNC: 109 MG/DL — HIGH (ref 70–99)
HCT VFR BLD CALC: 30.4 % — LOW (ref 39–50)
HGB BLD-MCNC: 9.8 G/DL — LOW (ref 13–17)
IMM GRANULOCYTES NFR BLD AUTO: 0.1 % — SIGNIFICANT CHANGE UP (ref 0–0.9)
LYMPHOCYTES # BLD AUTO: 0.92 K/UL — LOW (ref 1–3.3)
LYMPHOCYTES # BLD AUTO: 12.3 % — LOW (ref 13–44)
MCHC RBC-ENTMCNC: 26.5 PG — LOW (ref 27–34)
MCHC RBC-ENTMCNC: 32.2 GM/DL — SIGNIFICANT CHANGE UP (ref 32–36)
MCV RBC AUTO: 82.2 FL — SIGNIFICANT CHANGE UP (ref 80–100)
MONOCYTES # BLD AUTO: 0.77 K/UL — SIGNIFICANT CHANGE UP (ref 0–0.9)
MONOCYTES NFR BLD AUTO: 10.3 % — SIGNIFICANT CHANGE UP (ref 2–14)
NEUTROPHILS # BLD AUTO: 5.6 K/UL — SIGNIFICANT CHANGE UP (ref 1.8–7.4)
NEUTROPHILS NFR BLD AUTO: 75.3 % — SIGNIFICANT CHANGE UP (ref 43–77)
NRBC # BLD: 0 /100 WBCS — SIGNIFICANT CHANGE UP (ref 0–0)
PLATELET # BLD AUTO: 496 K/UL — HIGH (ref 150–400)
POTASSIUM SERPL-MCNC: 3.9 MMOL/L — SIGNIFICANT CHANGE UP (ref 3.5–5.3)
POTASSIUM SERPL-SCNC: 3.9 MMOL/L — SIGNIFICANT CHANGE UP (ref 3.5–5.3)
RBC # BLD: 3.7 M/UL — LOW (ref 4.2–5.8)
RBC # FLD: 15.4 % — HIGH (ref 10.3–14.5)
SODIUM SERPL-SCNC: 134 MMOL/L — LOW (ref 135–145)
SPECIMEN SOURCE: SIGNIFICANT CHANGE UP
WBC # BLD: 7.45 K/UL — SIGNIFICANT CHANGE UP (ref 3.8–10.5)
WBC # FLD AUTO: 7.45 K/UL — SIGNIFICANT CHANGE UP (ref 3.8–10.5)

## 2022-12-21 PROCEDURE — 93971 EXTREMITY STUDY: CPT | Mod: 26,LT

## 2022-12-21 PROCEDURE — 99232 SBSQ HOSP IP/OBS MODERATE 35: CPT

## 2022-12-21 PROCEDURE — 99233 SBSQ HOSP IP/OBS HIGH 50: CPT

## 2022-12-21 RX ORDER — IBUPROFEN 200 MG
600 TABLET ORAL EVERY 6 HOURS
Refills: 0 | Status: DISCONTINUED | OUTPATIENT
Start: 2022-12-21 | End: 2022-12-27

## 2022-12-21 RX ORDER — ENOXAPARIN SODIUM 100 MG/ML
40 INJECTION SUBCUTANEOUS EVERY 24 HOURS
Refills: 0 | Status: DISCONTINUED | OUTPATIENT
Start: 2022-12-21 | End: 2022-12-21

## 2022-12-21 RX ORDER — SENNA PLUS 8.6 MG/1
2 TABLET ORAL AT BEDTIME
Refills: 0 | Status: DISCONTINUED | OUTPATIENT
Start: 2022-12-21 | End: 2022-12-27

## 2022-12-21 RX ORDER — LACTULOSE 10 G/15ML
10 SOLUTION ORAL AT BEDTIME
Refills: 0 | Status: DISCONTINUED | OUTPATIENT
Start: 2022-12-21 | End: 2022-12-27

## 2022-12-21 RX ORDER — ENOXAPARIN SODIUM 100 MG/ML
40 INJECTION SUBCUTANEOUS EVERY 12 HOURS
Refills: 0 | Status: DISCONTINUED | OUTPATIENT
Start: 2022-12-21 | End: 2022-12-27

## 2022-12-21 RX ORDER — MAGNESIUM HYDROXIDE 400 MG/1
30 TABLET, CHEWABLE ORAL AT BEDTIME
Refills: 0 | Status: DISCONTINUED | OUTPATIENT
Start: 2022-12-21 | End: 2022-12-27

## 2022-12-21 RX ORDER — ACETAMINOPHEN 500 MG
975 TABLET ORAL EVERY 8 HOURS
Refills: 0 | Status: DISCONTINUED | OUTPATIENT
Start: 2022-12-21 | End: 2022-12-27

## 2022-12-21 RX ADMIN — Medication 600 MILLIGRAM(S): at 11:33

## 2022-12-21 RX ADMIN — Medication 975 MILLIGRAM(S): at 00:16

## 2022-12-21 RX ADMIN — Medication 50 MILLIGRAM(S): at 11:33

## 2022-12-21 RX ADMIN — OXYCODONE HYDROCHLORIDE 10 MILLIGRAM(S): 5 TABLET ORAL at 22:35

## 2022-12-21 RX ADMIN — OXYCODONE HYDROCHLORIDE 10 MILLIGRAM(S): 5 TABLET ORAL at 21:49

## 2022-12-21 RX ADMIN — DAPTOMYCIN 136 MILLIGRAM(S): 500 INJECTION, POWDER, LYOPHILIZED, FOR SOLUTION INTRAVENOUS at 23:26

## 2022-12-21 RX ADMIN — Medication 975 MILLIGRAM(S): at 01:22

## 2022-12-21 RX ADMIN — Medication 975 MILLIGRAM(S): at 21:49

## 2022-12-21 RX ADMIN — Medication 975 MILLIGRAM(S): at 22:35

## 2022-12-21 RX ADMIN — MAGNESIUM HYDROXIDE 30 MILLILITER(S): 400 TABLET, CHEWABLE ORAL at 23:26

## 2022-12-21 RX ADMIN — LACTULOSE 10 GRAM(S): 10 SOLUTION ORAL at 23:26

## 2022-12-21 RX ADMIN — Medication 600 MILLIGRAM(S): at 23:26

## 2022-12-21 RX ADMIN — SENNA PLUS 2 TABLET(S): 8.6 TABLET ORAL at 23:26

## 2022-12-21 RX ADMIN — Medication 975 MILLIGRAM(S): at 05:31

## 2022-12-21 RX ADMIN — OXYCODONE HYDROCHLORIDE 10 MILLIGRAM(S): 5 TABLET ORAL at 07:30

## 2022-12-21 RX ADMIN — OXYCODONE HYDROCHLORIDE 10 MILLIGRAM(S): 5 TABLET ORAL at 06:56

## 2022-12-21 RX ADMIN — Medication 600 MILLIGRAM(S): at 04:42

## 2022-12-21 RX ADMIN — Medication 600 MILLIGRAM(S): at 17:53

## 2022-12-21 RX ADMIN — Medication 60 MILLIGRAM(S): at 05:31

## 2022-12-21 RX ADMIN — Medication 975 MILLIGRAM(S): at 15:38

## 2022-12-21 NOTE — PROGRESS NOTE ADULT - SUBJECTIVE AND OBJECTIVE BOX
Patient is a 52y old  Male who presents with a chief complaint of septic L shoulder (20 Dec 2022 12:14)        SUBJECTIVE:  Patient was seen and examined at bedside.    Overnight Events : still having intermittent fevers , complaints of left shoulder and arm pain       Review of systems: 12 point Review of systems negative unless otherwise documented elsewhere in note.     Diet, DASH/TLC:   Sodium & Cholesterol Restricted (22 @ 17:32) [Active]      MEDICATIONS:  MEDICATIONS  (STANDING):  DAPTOmycin IVPB 900 milliGRAM(s) IV Intermittent every 24 hours  influenza   Vaccine 0.5 milliLiter(s) IntraMuscular once  metoprolol succinate ER 50 milliGRAM(s) Oral every 24 hours  NIFEdipine XL 30 milliGRAM(s) Oral every 24 hours  ondansetron Injectable 4 milliGRAM(s) IV Push once  sodium chloride 0.9%. 1000 milliLiter(s) (160 mL/Hr) IV Continuous <Continuous>    MEDICATIONS  (PRN):  acetaminophen     Tablet .. 975 milliGRAM(s) Oral every 8 hours PRN Temp greater or equal to 38C (100.4F)  benzocaine 15 mG/menthol 3.6 mG Lozenge 1 Lozenge Oral four times a day PRN Sore Throat  HYDROmorphone  Injectable 1 milliGRAM(s) IV Push every 4 hours PRN Severe Pain (7 - 10)  ibuprofen  Tablet. 600 milliGRAM(s) Oral every 8 hours PRN Temp greater or equal to 38C (100.4F)  melatonin 5 milliGRAM(s) Oral at bedtime PRN Sleep  oxyCODONE    IR 5 milliGRAM(s) Oral every 4 hours PRN Moderate Pain (4 - 6)  oxyCODONE    IR 10 milliGRAM(s) Oral every 4 hours PRN Severe Pain (7 - 10)      Allergies    No Known Allergies    Intolerances        OBJECTIVE:  Vital Signs Last 24 Hrs  T(C): 36.4 (21 Dec 2022 11:33), Max: 38.3 (21 Dec 2022 00:07)  T(F): 97.6 (21 Dec 2022 11:33), Max: 101 (21 Dec 2022 00:07)  HR: 106 (21 Dec 2022 12:00) (86 - 113)  BP: 134/76 (21 Dec 2022 12:00) (116/70 - 150/83)  BP(mean): --  RR: 18 (21 Dec 2022 08:40) (18 - 20)  SpO2: 100% (21 Dec 2022 08:40) (98% - 100%)    Parameters below as of 21 Dec 2022 08:40  Patient On (Oxygen Delivery Method): room air          PHYSICAL EXAM:  Gen: Resting in bed at time of exam, not in distress   HEENT: moist mucosa, no lesions   Neck: supple, trachea at midline  CV: RRR, +S1/S2  Pulm: no wheezing , no crackles  no increase in work of breathing  Abd: soft, NTND  Skin: warm and dry, no new rashes   Ext: left shoulder , arm , fore arm  ,wrist and hand swollen, warm , tender , Range of motion limited due to pain   Neuro: AOx3, no gross focal neurological deficits  Psych: affect and behavior appropriate, pleasant at time of interview    LABS:                                   9.8    7.45  )-----------( 496      ( 21 Dec 2022 05:30 )             30.4     12-21    134<L>  |  102  |  10  ----------------------------<  109<H>  3.9   |  23  |  0.77    Ca    8.2<L>      21 Dec 2022 05:30        CAPILLARY BLOOD GLUCOSE        Urinalysis Basic - ( 19 Dec 2022 04:15 )    Color: Yellow / Appearance: Clear / S.025 / pH: x  Gluc: x / Ketone: NEGATIVE  / Bili: Negative / Urobili: 2.0 E.U./dL   Blood: x / Protein: 30 mg/dL / Nitrite: NEGATIVE   Leuk Esterase: NEGATIVE / RBC: < 5 /HPF / WBC < 5 /HPF   Sq Epi: x / Non Sq Epi: 0-5 /HPF / Bacteria: Present /HPF          Culture - Blood (collected 18 Dec 2022 22:49)  Source: .Blood Blood  Preliminary Report (21 Dec 2022 01:00):    No growth at 2 days.    Culture - Blood (collected 18 Dec 2022 22:49)  Source: .Blood Blood  Preliminary Report (21 Dec 2022 01:00):    No growth at 2 days.          RADIOLOGY/OTHER STUDIES:

## 2022-12-21 NOTE — DIETITIAN INITIAL EVALUATION ADULT - ADD RECOMMEND
1) Continue on DASH TLC diet  2) Honor pts food preferences as able  3) Encourage protein options 2/2 increased needs post-op  4) Monitor BMP, lytes, replete prn

## 2022-12-21 NOTE — DIETITIAN INITIAL EVALUATION ADULT - PERTINENT LABORATORY DATA
12-21    134<L>  |  102  |  10  ----------------------------<  109<H>  3.9   |  23  |  0.77    Ca    8.2<L>      21 Dec 2022 05:30    A1C with Estimated Average Glucose Result: 5.7 % (12-17-22 @ 08:14)

## 2022-12-21 NOTE — DIETITIAN INITIAL EVALUATION ADULT - PERTINENT MEDS FT
MEDICATIONS  (STANDING):  acetaminophen     Tablet .. 975 milliGRAM(s) Oral every 8 hours  DAPTOmycin IVPB 900 milliGRAM(s) IV Intermittent every 24 hours  ibuprofen  Tablet. 600 milliGRAM(s) Oral every 6 hours  influenza   Vaccine 0.5 milliLiter(s) IntraMuscular once  metoprolol succinate ER 50 milliGRAM(s) Oral every 24 hours  NIFEdipine XL 60 milliGRAM(s) Oral daily  ondansetron Injectable 4 milliGRAM(s) IV Push once  sodium chloride 0.9%. 1000 milliLiter(s) (160 mL/Hr) IV Continuous <Continuous>    MEDICATIONS  (PRN):  benzocaine 15 mG/menthol 3.6 mG Lozenge 1 Lozenge Oral four times a day PRN Sore Throat  HYDROmorphone  Injectable 1 milliGRAM(s) IV Push every 4 hours PRN Severe Pain (7 - 10)  melatonin 5 milliGRAM(s) Oral at bedtime PRN Sleep  oxyCODONE    IR 5 milliGRAM(s) Oral every 4 hours PRN Moderate Pain (4 - 6)  oxyCODONE    IR 10 milliGRAM(s) Oral every 4 hours PRN Severe Pain (7 - 10)  
No

## 2022-12-21 NOTE — DIETITIAN INITIAL EVALUATION ADULT - OTHER CALCULATIONS
lbs. %%. IBW used to calculate energy needs due to pt's current body weight exceeding 120% of IBW. Needs adjusted for wt and age, post op demands

## 2022-12-21 NOTE — PROGRESS NOTE ADULT - ASSESSMENT
52 year old male admitted for septic joint     Impression and Plan   # Sepsis   # Septic Left shoulder Joint s/p Washout 12/17   - IV Daptomycin , MRSA in Culture from OR , patient still febrile   - Blood cx negative x 2 days   - tyelenol  and motrin for fever  - Recommend venous duplex to r/o DVT LUE , if negative will need to consider repeat washout of shoulder     # SVT   -s/p adenosine , continue metoprolol xl and nifedipine     # Hyponatremia   - asymptomatic , Na around 133- 135 consistently     # DVT prophylaxis

## 2022-12-21 NOTE — PROGRESS NOTE ADULT - SUBJECTIVE AND OBJECTIVE BOX
INFECTIOUS DISEASES CONSULT FOLLOW-UP NOTE    INTERVAL HPI/OVERNIGHT EVENTS:  patient febrile to 101 overnight  reports L shoulder pain but better than before  denied n/v/d, abdominal pain     ROS:   Constitutional, eyes, ENT, cardiovascular, respiratory, gastrointestinal, genitourinary, integumentary, neurological, psychiatric and heme/lymph are otherwise negative other than noted above       ANTIBIOTICS/RELEVANT:    MEDICATIONS  (STANDING):  acetaminophen     Tablet .. 975 milliGRAM(s) Oral every 8 hours  DAPTOmycin IVPB 900 milliGRAM(s) IV Intermittent every 24 hours  ibuprofen  Tablet. 600 milliGRAM(s) Oral every 6 hours  influenza   Vaccine 0.5 milliLiter(s) IntraMuscular once  metoprolol succinate ER 50 milliGRAM(s) Oral every 24 hours  NIFEdipine XL 60 milliGRAM(s) Oral daily  ondansetron Injectable 4 milliGRAM(s) IV Push once  sodium chloride 0.9%. 1000 milliLiter(s) (160 mL/Hr) IV Continuous <Continuous>    MEDICATIONS  (PRN):  benzocaine 15 mG/menthol 3.6 mG Lozenge 1 Lozenge Oral four times a day PRN Sore Throat  HYDROmorphone  Injectable 1 milliGRAM(s) IV Push every 4 hours PRN Severe Pain (7 - 10)  melatonin 5 milliGRAM(s) Oral at bedtime PRN Sleep  oxyCODONE    IR 5 milliGRAM(s) Oral every 4 hours PRN Moderate Pain (4 - 6)  oxyCODONE    IR 10 milliGRAM(s) Oral every 4 hours PRN Severe Pain (7 - 10)        Vital Signs Last 24 Hrs  T(C): 36.8 (21 Dec 2022 08:40), Max: 38.3 (21 Dec 2022 00:07)  T(F): 98.3 (21 Dec 2022 08:40), Max: 101 (21 Dec 2022 00:07)  HR: 94 (21 Dec 2022 08:40) (94 - 113)  BP: 116/70 (21 Dec 2022 08:40) (116/70 - 150/83)  BP(mean): --  RR: 18 (21 Dec 2022 08:40) (18 - 20)  SpO2: 100% (21 Dec 2022 08:40) (98% - 100%)    Parameters below as of 21 Dec 2022 08:40  Patient On (Oxygen Delivery Method): room air        12-20-22 @ 07:01  -  12-21-22 @ 07:00  --------------------------------------------------------  IN: 1890 mL / OUT: 4175 mL / NET: -2285 mL      PHYSICAL EXAM:  Constitutional: alert, NAD  Eyes: the sclera and conjunctiva were normal.   ENT: the ears and nose were normal in appearance.   Neck: the appearance of the neck was normal and the neck was supple.   Pulmonary: no respiratory distress and lungs were clear to auscultation bilaterally.   Heart: heart rate was normal and rhythm regular, normal S1 and S2  Vascular:. there was no peripheral edema  Abdomen: normal bowel sounds, soft, non-tender  Neurological: no focal deficits.   Ext: L shoulder covered with dressing, L arm swelling             LABS:                        9.8    7.45  )-----------( 496      ( 21 Dec 2022 05:30 )             30.4     12-21    134<L>  |  102  |  10  ----------------------------<  109<H>  3.9   |  23  |  0.77    Ca    8.2<L>      21 Dec 2022 05:30            MICROBIOLOGY:  12/18 BCx ngtd x 2  12/17 L shoulder abscess AFB: p x 3  12/17 L shoulder abscess fungal: p x 3  12/17 L shoulder abscess 1: MRSA  12/17 L shoulder abscess 2: p  12/17 L shoulder abscess 3: MRSA (S to vanc, clinda, dapto, linez, rif, bact)  12/15 BCx ngtd x 2  12/15 BCx ngtd x 2      RADIOLOGY & ADDITIONAL STUDIES:  CT shoulder L 12/15  IMPRESSION:    Nonspecific large glenohumeral joint effusion. If there is clinical   concern for septic arthritis, joint aspiration should be performed.    Foci of soft tissue gas within the lateral deltoid muscle, which may be   related to a gas-forming/necrotizing infection or be related to recent   injection. Clinical correlation recommended.    No acute displaced fracture. Inferior subluxation of the humeral head at   the glenohumeral joint, likely related to the patient's large   glenohumeral joint effusion.    Indeterminate hypoattenuating nodule within the right lobe of the   thyroid. Recommend further evaluation with nonemergent thyroid ultrasound

## 2022-12-21 NOTE — PROGRESS NOTE ADULT - ASSESSMENT
52M h/o obesity p/w L shoulder pain and swelling x 1 week, found to have L shoulder septic arthritis.  Now s/p washout on 12/17.  OR culture growing MRSA.  Still intermittently febrile, although fever curve downtrending and WBC normal.   Source of fever likely due to DVT vs L shoulder septic arthritis.  Case d/w Dr. Ozuna - will first r/o DVT and if still febrile and DVT neg, then L shoulder may need to be washed out.     - cont daptomycin 900mg IV q24h  - obtain LUE Duplex to r/o DVT   - consider L shoulder aspiration or washout if DVT neg and he continues to spike fever  - Place single lumen PICC line once afebrile >24h  - Duration of antibiotics is 4 weeks ( 12/18/22 - 1/14/23 ) to treat septic arthritis of L shoulder   - Weekly labs: CMP, CBC, ESR, CRP, CK faxed to ID office at [phone]  - Patient to follow up with Dr. rojas in 2 weeks ([address] [place] [address], [phone]), ID office will call patient to schedule       Team 2 will follow you.  Dr. France will take over the care tomorrow.  Case d/w primary team.    Digna Rojas MD, MS  Infectious Disease attending  work cell [phone]   For any questions during evening/weekend/holiday, please page ID on call

## 2022-12-21 NOTE — DIETITIAN INITIAL EVALUATION ADULT - OTHER INFO
53 y/o male with obesity, no known significant PMHx (doesn't see doctors frequently), presented to [facility] ER 12/15 with atraumatic left shoulder pain and swelling x approximately 1 week, found to have septic L shoulder joint and was sent to [facility] ER for ortho admission, but in the ER was noted to be in SVT to 200s (resolved after adenosine 12mg IV x2 followed by Diltiazem 20mg IV x2), and admitted to cardiac tele for further management of SVT and eventual L shoulder washout with ortho. Pt s/p L shoulder arthroscopic washout on 12/17.    Patient seen room, resting in bed. Currently on a DASH/TLC diet and Endorsing a good appetite, consuming >50% meals, but no breakfast this morning as pt was tired and wanted to sleep. Discussed current diet, reviewed importance, encourage low sodium, low fat/cholesterol options, noted [employer] bag on table. Denies N/V/D/C. LBM 12/19. NKFA or intolerances. Does not adhere to any particular dietary restrictions. Denies weight changes PTA. Skin: Fernando 19, surgical incision noted. No PU, noted edema 1+ to L arm and L shoulder. GI WNL per flowsheet. Education provided on increased protein needs post-op. RD to follow.

## 2022-12-21 NOTE — PROGRESS NOTE ADULT - SUBJECTIVE AND OBJECTIVE BOX
Ortho Progress Note    Procedure: L shoulder arthroscopic washout  Surgeon: Sulma    Pt comfortable without complaints, pain controlled. Tmax 101    Denies CP, SOB, N/V, numbness/tingling     Vital Signs Last 24 Hrs  T(C): 37.6 (21 Dec 2022 06:53), Max: 38.3 (21 Dec 2022 00:07)  T(F): 99.7 (21 Dec 2022 06:53), Max: 101 (21 Dec 2022 00:07)  HR: 107 (21 Dec 2022 05:30) (99 - 113)  BP: 134/77 (21 Dec 2022 06:53) (133/76 - 150/83)  BP(mean): --  RR: 18 (21 Dec 2022 05:30) (18 - 20)  SpO2: 100% (21 Dec 2022 05:30) (98% - 100%)    Parameters below as of 21 Dec 2022 05:30  Patient On (Oxygen Delivery Method): room air        Physical Exam:  General: Pt Alert and oriented, NAD  L shoulder DSG C/D/I  Pulses: 2+ radial pulses, wwp, cap refill <3 sec  Sensation: SILT in axillary/radial/median/ulnar distributions  Motor: axillary/AIN/PIN/ulnar firing. Pain with FF to 40       Culture - Abscess with Gram Stain (12.17.22 @ 09:23)    Gram Stain:   No organisms seen  Moderate White blood cells    -  Clindamycin: S 0.5    -  Daptomycin: S 1    -  Erythromycin: S <=0.25    -  Linezolid: S 2    -  Oxacillin: R >2    -  Rifampin: S <=1 Should not be used as monotherapy    -  Tetracycline: R >8    -  Trimethoprim/Sulfamethoxazole: S 2/38    -  Vancomycin: S 2    -  Vancomycin: S 1.5    Specimen Source: .Abscess 3. Left shoulder abscess    Culture Results:   Rare Methicillin Resistant Staphylococcus aureus  Result called to and read back by_ Ms. DINO Mckenzie RN  12/18/2022 15:12:12    Organism Identification: Methicillin resistant Staphylococcus aureus  Methicillin resistant Staphylococcus aureus    Organism: Methicillin resistant Staphylococcus aureus    Organism: Methicillin resistant Staphylococcus aureus    Method Type: ETEST    Method Type: GREGOR                              9.8    7.45  )-----------( 496      ( 21 Dec 2022 05:30 )             30.4       A/P: 52yMale s/p L shoulder arthroscopic washout  - Stable  - Pain Control  - f/u AM labs  - DVT ppx: SCDs  - Post op abx: dapto  - ID consult  - PT, WBS: WBAT  - Dispo: pending PICC needs to be afebrile for 24h

## 2022-12-22 ENCOUNTER — TRANSCRIPTION ENCOUNTER (OUTPATIENT)
Age: 52
End: 2022-12-22

## 2022-12-22 PROCEDURE — 99232 SBSQ HOSP IP/OBS MODERATE 35: CPT

## 2022-12-22 RX ORDER — DAPTOMYCIN 500 MG/10ML
1000 INJECTION, POWDER, LYOPHILIZED, FOR SOLUTION INTRAVENOUS EVERY 24 HOURS
Refills: 0 | Status: DISCONTINUED | OUTPATIENT
Start: 2022-12-22 | End: 2022-12-27

## 2022-12-22 RX ADMIN — Medication 975 MILLIGRAM(S): at 21:01

## 2022-12-22 RX ADMIN — Medication 600 MILLIGRAM(S): at 11:58

## 2022-12-22 RX ADMIN — DAPTOMYCIN 140 MILLIGRAM(S): 500 INJECTION, POWDER, LYOPHILIZED, FOR SOLUTION INTRAVENOUS at 23:19

## 2022-12-22 RX ADMIN — Medication 60 MILLIGRAM(S): at 06:58

## 2022-12-22 RX ADMIN — Medication 50 MILLIGRAM(S): at 11:58

## 2022-12-22 RX ADMIN — ENOXAPARIN SODIUM 40 MILLIGRAM(S): 100 INJECTION SUBCUTANEOUS at 06:58

## 2022-12-22 RX ADMIN — LACTULOSE 10 GRAM(S): 10 SOLUTION ORAL at 22:10

## 2022-12-22 RX ADMIN — Medication 975 MILLIGRAM(S): at 06:58

## 2022-12-22 RX ADMIN — OXYCODONE HYDROCHLORIDE 5 MILLIGRAM(S): 5 TABLET ORAL at 02:45

## 2022-12-22 RX ADMIN — Medication 600 MILLIGRAM(S): at 17:23

## 2022-12-22 RX ADMIN — MAGNESIUM HYDROXIDE 30 MILLILITER(S): 400 TABLET, CHEWABLE ORAL at 22:10

## 2022-12-22 RX ADMIN — SENNA PLUS 2 TABLET(S): 8.6 TABLET ORAL at 21:01

## 2022-12-22 RX ADMIN — Medication 975 MILLIGRAM(S): at 14:06

## 2022-12-22 RX ADMIN — OXYCODONE HYDROCHLORIDE 5 MILLIGRAM(S): 5 TABLET ORAL at 01:54

## 2022-12-22 RX ADMIN — Medication 600 MILLIGRAM(S): at 07:16

## 2022-12-22 RX ADMIN — ENOXAPARIN SODIUM 40 MILLIGRAM(S): 100 INJECTION SUBCUTANEOUS at 17:25

## 2022-12-22 NOTE — PROGRESS NOTE ADULT - SUBJECTIVE AND OBJECTIVE BOX
Patient is a 52y old  Male who presents with a chief complaint of septic L shoulder (20 Dec 2022 12:14)        SUBJECTIVE:  Patient was seen and examined at bedside.    Overnight Events : still having intermittent fevers , complaints of left shoulder and arm pain , but trend of tmax is lower each time       Review of systems: 12 point Review of systems negative unless otherwise documented elsewhere in note.     Diet, DASH/TLC:   Sodium & Cholesterol Restricted (22 @ 17:32) [Active]      MEDICATIONS:  MEDICATIONS  (STANDING):  DAPTOmycin IVPB 900 milliGRAM(s) IV Intermittent every 24 hours  influenza   Vaccine 0.5 milliLiter(s) IntraMuscular once  metoprolol succinate ER 50 milliGRAM(s) Oral every 24 hours  NIFEdipine XL 30 milliGRAM(s) Oral every 24 hours  ondansetron Injectable 4 milliGRAM(s) IV Push once  sodium chloride 0.9%. 1000 milliLiter(s) (160 mL/Hr) IV Continuous <Continuous>    MEDICATIONS  (PRN):  acetaminophen     Tablet .. 975 milliGRAM(s) Oral every 8 hours PRN Temp greater or equal to 38C (100.4F)  benzocaine 15 mG/menthol 3.6 mG Lozenge 1 Lozenge Oral four times a day PRN Sore Throat  HYDROmorphone  Injectable 1 milliGRAM(s) IV Push every 4 hours PRN Severe Pain (7 - 10)  ibuprofen  Tablet. 600 milliGRAM(s) Oral every 8 hours PRN Temp greater or equal to 38C (100.4F)  melatonin 5 milliGRAM(s) Oral at bedtime PRN Sleep  oxyCODONE    IR 5 milliGRAM(s) Oral every 4 hours PRN Moderate Pain (4 - 6)  oxyCODONE    IR 10 milliGRAM(s) Oral every 4 hours PRN Severe Pain (7 - 10)      Allergies    No Known Allergies    Intolerances        OBJECTIVE:  Vital Signs Last 24 Hrs  T(C): 37.2 (22 Dec 2022 12:09), Max: 38.1 (22 Dec 2022 07:05)  T(F): 99 (22 Dec 2022 12:09), Max: 100.5 (22 Dec 2022 07:05)  HR: 104 (22 Dec 2022 08:36) (89 - 104)  BP: 136/70 (22 Dec 2022 08:36) (124/79 - 138/85)  BP(mean): --  RR: 18 (22 Dec 2022 08:36) (17 - 18)  SpO2: 95% (22 Dec 2022 08:36) (95% - 99%)    Parameters below as of 22 Dec 2022 08:36  Patient On (Oxygen Delivery Method): room air              PHYSICAL EXAM:  Gen: Resting in bed at time of exam, not in distress   HEENT: moist mucosa, no lesions   Neck: supple, trachea at midline  CV: RRR, +S1/S2  Pulm: no wheezing , no crackles  no increase in work of breathing  Abd: soft, NTND  Skin: warm and dry, no new rashes   Ext: left shoulder , arm , fore arm  ,wrist and hand swollen, warm , tender , Range of motion limited due to pain   Neuro: AOx3, no gross focal neurological deficits  Psych: affect and behavior appropriate, pleasant at time of interview    LABS:                                              9.8    7.45  )-----------( 496      ( 21 Dec 2022 05:30 )             30.4     12-21    134<L>  |  102  |  10  ----------------------------<  109<H>  3.9   |  23  |  0.77    Ca    8.2<L>      21 Dec 2022 05:30          CAPILLARY BLOOD GLUCOSE        Urinalysis Basic - ( 19 Dec 2022 04:15 )    Color: Yellow / Appearance: Clear / S.025 / pH: x  Gluc: x / Ketone: NEGATIVE  / Bili: Negative / Urobili: 2.0 E.U./dL   Blood: x / Protein: 30 mg/dL / Nitrite: NEGATIVE   Leuk Esterase: NEGATIVE / RBC: < 5 /HPF / WBC < 5 /HPF   Sq Epi: x / Non Sq Epi: 0-5 /HPF / Bacteria: Present /HPF          Culture - Blood (collected 18 Dec 2022 22:49)  Source: .Blood Blood  Preliminary Report (21 Dec 2022 01:00):    No growth at 2 days.    Culture - Blood (collected 18 Dec 2022 22:49)  Source: .Blood Blood  Preliminary Report (21 Dec 2022 01:00):    No growth at 2 days.          RADIOLOGY/OTHER STUDIES:

## 2022-12-22 NOTE — DISCHARGE NOTE PROVIDER - NSDCFUADDINST_GEN_ALL_CORE_FT
Keep left shoulder dressing clean/dry.   No strenuous activity, heavy lifting, driving or returning to work until cleared by MD.  You are being followed by infectious disease for long term antibiotic therapy. Weekly lab-work will be faxed to Dr. Rojas. Follow up with Dr. Rojas as directed.   Try to have regular bowel movements, take stool softener or laxative if necessary.  Call to schedule an appt with Dr. Ozuna for follow up, if you have staples or sutures they will be removed in office.  Contact your doctor if you experience: fever greater than 101.5, chills, chest pain, difficulty breathing, redness or excessive drainage around the incision, other concerns.  Follow up with your primary care provider.   You were seen by the cardiology and electrophysiology teams during your admission who recommend you to follow up with electrophysiology upon discharge - you can make an appointment with Dr. Alexys Puente [follow up information is included in discharge packet]  Keep left shoulder dressing clean/dry.   No strenuous activity, heavy lifting, driving or returning to work until cleared by MD.  You are being followed by infectious disease for long term antibiotic therapy. Weekly lab-work will be faxed to Dr. Rojas. Follow up with Dr. Rojas as directed.   You were discharged on Daptomycin 1gram IV every 24 hours   - Duration of antibiotics is 4 weeks ( 12/18/22 - 1/14/23 ) to treat septic arthritis of L shoulder   Weekly labs: CMP, CBC, ESR, CRP, CK faxed to ID office at [phone]    Try to have regular bowel movements, take stool softener or laxative if necessary.  Call to schedule an appt with Dr. Ozuna for follow up, if you have staples or sutures they will be removed in office.  Contact your doctor if you experience: fever greater than 101.5, chills, chest pain, difficulty breathing, redness or excessive drainage around the incision, other concerns.  Follow up with your primary care provider.   You had a Left upper extremity duplex which was negative for DVT   You were seen by the cardiology and electrophysiology teams during your admission who recommend you to follow up with electrophysiology upon discharge - you can make an appointment with Dr. Alexys Puente [follow up information is included in discharge packet]

## 2022-12-22 NOTE — DISCHARGE NOTE PROVIDER - PROVIDER TOKENS
PROVIDER:[TOKEN:[[id number]]],PROVIDER:[TOKEN:[[id number]]] PROVIDER:[TOKEN:[[id number]]],PROVIDER:[TOKEN:[[id number]]],PROVIDER:[TOKEN:[[id number]]]

## 2022-12-22 NOTE — DISCHARGE NOTE PROVIDER - CARE PROVIDER_API CALL
Iain Ozuna)  Orthopaedic Surgery; Sports Medicine  [address], [address] [place] [address]  Phone: [phone]  Fax: ()-  Follow Up Time:     Digna Rojas)  Infectious Disease; Internal Medicine  [address] [place] [address]  Phone: [phone]  Fax: [phone]  Follow Up Time:    Iain Ozuna)  Orthopaedic Surgery; Sports Medicine  [address], [address] [place] [address]  Phone: [phone]  Fax: ()-  Follow Up Time:     Digna Rojas)  Infectious Disease; Internal Medicine  [address] [place] [address]  Phone: [phone]  Fax: [phone]  Follow Up Time:     Alexys Puente)  Cardiac Electrophysiology; Cardiovascular Disease; Internal Medicine  [address] [place] [address]  Phone: [phone]  Fax: [phone]  Follow Up Time:

## 2022-12-22 NOTE — PROGRESS NOTE ADULT - ASSESSMENT
52 year old male admitted for septic joint     Impression and Plan   # Sepsis   # Septic Left shoulder Joint s/p Washout 12/17   - IV Daptomycin , MRSA in Culture from OR , patient still febrile   - Blood cx negative x 3 days   - tyelenol  and motrin for fever  - Venous duplex negative   - if afebrile for 24 hours can place PICC per ID recommendations     # SVT   -s/p adenosine , continue metoprolol xl and nifedipine     # Hyponatremia   - asymptomatic , Na around 133- 135 consistently     # DVT prophylaxis - Lovenox

## 2022-12-22 NOTE — PROGRESS NOTE ADULT - SUBJECTIVE AND OBJECTIVE BOX
INTERVAL HPI/OVERNIGHT EVENTS: Unable to move L shoulder although pain improved. Improving fever curve.    CONSTITUTIONAL:  Negative fever or chills, feels well, good appetite  EYES:  Negative  blurry vision or double vision  CARDIOVASCULAR:  Negative for chest pain or palpitations  RESPIRATORY:  Negative for cough, wheezing, or SOB   GASTROINTESTINAL:  Negative for nausea, vomiting, diarrhea, constipation, or abdominal pain  GENITOURINARY:  Negative frequency, urgency or dysuria  NEUROLOGIC:  No headache, confusion, dizziness, lightheadedness      ANTIBIOTICS/RELEVANT:    MEDICATIONS  (STANDING):  acetaminophen     Tablet .. 975 milliGRAM(s) Oral every 8 hours  DAPTOmycin IVPB 1000 milliGRAM(s) IV Intermittent every 24 hours  enoxaparin Injectable 40 milliGRAM(s) SubCutaneous every 12 hours  ibuprofen  Tablet. 600 milliGRAM(s) Oral every 6 hours  influenza   Vaccine 0.5 milliLiter(s) IntraMuscular once  lactulose Syrup 10 Gram(s) Oral at bedtime  magnesium hydroxide Suspension 30 milliLiter(s) Oral at bedtime  metoprolol succinate ER 50 milliGRAM(s) Oral every 24 hours  NIFEdipine XL 60 milliGRAM(s) Oral daily  ondansetron Injectable 4 milliGRAM(s) IV Push once  senna 2 Tablet(s) Oral at bedtime  sodium chloride 0.9%. 1000 milliLiter(s) (160 mL/Hr) IV Continuous <Continuous>    MEDICATIONS  (PRN):  benzocaine 15 mG/menthol 3.6 mG Lozenge 1 Lozenge Oral four times a day PRN Sore Throat  HYDROmorphone  Injectable 1 milliGRAM(s) IV Push every 4 hours PRN Severe Pain (7 - 10)  melatonin 5 milliGRAM(s) Oral at bedtime PRN Sleep  oxyCODONE    IR 5 milliGRAM(s) Oral every 4 hours PRN Moderate Pain (4 - 6)  oxyCODONE    IR 10 milliGRAM(s) Oral every 4 hours PRN Severe Pain (7 - 10)        Vital Signs Last 24 Hrs  T(C): 37.2 (22 Dec 2022 12:09), Max: 38.1 (22 Dec 2022 07:05)  T(F): 99 (22 Dec 2022 12:09), Max: 100.5 (22 Dec 2022 07:05)  HR: 104 (22 Dec 2022 08:36) (89 - 104)  BP: 136/70 (22 Dec 2022 08:36) (124/79 - 138/85)  BP(mean): --  RR: 18 (22 Dec 2022 08:36) (17 - 18)  SpO2: 95% (22 Dec 2022 08:36) (95% - 99%)    Parameters below as of 22 Dec 2022 08:36  Patient On (Oxygen Delivery Method): room air        PHYSICAL EXAM:  Constitutional: NAD  Eyes: FLORY, EOMI  Ear/Nose/Throat: no oral lesion, no sinus tenderness on percussion	  Neck: no JVD, no lymphadenopathy, supple  Respiratory: CTA rocio  Cardiovascular: S1S2 RRR, no murmurs  Gastrointestinal:soft, (+) BS, no HSM  Extremities: LUE edema  Vascular: DP Pulse:	right normal; left normal      LABS:                        9.8    7.45  )-----------( 496      ( 21 Dec 2022 05:30 )             30.4     12-21    134<L>  |  102  |  10  ----------------------------<  109<H>  3.9   |  23  |  0.77    Ca    8.2<L>      21 Dec 2022 05:30            MICROBIOLOGY: reviewed    RADIOLOGY & ADDITIONAL STUDIES: reviewed

## 2022-12-22 NOTE — DISCHARGE NOTE PROVIDER - NSDCCPTREATMENT_GEN_ALL_CORE_FT
PRINCIPAL PROCEDURE  Procedure: Irrigation and debridement, bone, shoulder  Findings and Treatment:        PRINCIPAL PROCEDURE  Procedure: Irrigation and debridement, bone, shoulder  Findings and Treatment: septic arthritis

## 2022-12-22 NOTE — PROGRESS NOTE ADULT - SUBJECTIVE AND OBJECTIVE BOX
Ortho Progress Note    Procedure: L shoulder arthroscopic washout  Surgeon: Sulma    Pt comfortable without complaints, pain controlled. LUE swollen, doppler negative    Denies CP, SOB, N/V, numbness/tingling     Vital Signs Last 24 Hrs  T(C): 36.7 (22 Dec 2022 01:44), Max: 37.1 (21 Dec 2022 21:46)  T(F): 98.1 (22 Dec 2022 01:44), Max: 98.7 (21 Dec 2022 21:46)  HR: 96 (22 Dec 2022 01:44) (86 - 106)  BP: 127/68 (22 Dec 2022 01:44) (116/70 - 138/72)  BP(mean): --  RR: 18 (22 Dec 2022 01:44) (17 - 18)  SpO2: 96% (22 Dec 2022 01:44) (96% - 100%)    Parameters below as of 22 Dec 2022 01:44  Patient On (Oxygen Delivery Method): room air    Physical Exam:  General: Pt Alert and oriented, NAD  L shoulder DSG C/D/I  Pulses: 2+ radial pulses, wwp, cap refill <3 sec  Sensation: SILT in axillary/radial/median/ulnar distributions  Motor: axillary/AIN/PIN/ulnar firing. Pain with FF to 40       Culture - Abscess with Gram Stain (12.17.22 @ 09:23)    Gram Stain:   No organisms seen  Moderate White blood cells    -  Clindamycin: S 0.5    -  Daptomycin: S 1    -  Erythromycin: S <=0.25    -  Linezolid: S 2    -  Oxacillin: R >2    -  Rifampin: S <=1 Should not be used as monotherapy    -  Tetracycline: R >8    -  Trimethoprim/Sulfamethoxazole: S 2/38    -  Vancomycin: S 2    -  Vancomycin: S 1.5    Specimen Source: .Abscess 3. Left shoulder abscess    Culture Results:   Rare Methicillin Resistant Staphylococcus aureus  Result called to and read back by_ Ms. DINO Mckenzie RN  12/18/2022 15:12:12    Organism Identification: Methicillin resistant Staphylococcus aureus  Methicillin resistant Staphylococcus aureus    Organism: Methicillin resistant Staphylococcus aureus    Organism: Methicillin resistant Staphylococcus aureus    Method Type: ETEST    Method Type: GREGOR                          9.8    7.45  )-----------( 496      ( 21 Dec 2022 05:30 )             30.4       A/P: 52yMale s/p L shoulder arthroscopic washout  - Stable  - Pain Control  - f/u AM labs  - DVT ppx: SCDs  - Post op abx: dapto  - ID consult  - PT, WBS: WBAT  - Dispo: pending PICC

## 2022-12-22 NOTE — PROGRESS NOTE ADULT - SUBJECTIVE AND OBJECTIVE BOX
Orthopaedic Surgery Progress Note    Subjective:     Patient seen and examined. Patient has some left shoulder pain but improving.   Denies chest pain, shortness of breath, nausea/vomiting, numbness/tingling.    Objective:    Vital Signs Last 24 Hrs  T(C): 37.2 (12-22-22 @ 12:09), Max: 37.8 (12-22-22 @ 08:36)  T(F): 99 (12-22-22 @ 12:09), Max: 100.1 (12-22-22 @ 08:36)  HR: 104 (12-22-22 @ 08:36) (104 - 104)  BP: 136/70 (12-22-22 @ 08:36) (136/70 - 136/70)  BP(mean): --  RR: 18 (12-22-22 @ 08:36) (18 - 18)  SpO2: 95% (12-22-22 @ 08:36) (95% - 95%)  AVSS    PE:  General: Patient alert and oriented, NAD  Dressing: Clean/dry/intact left upper extremity dressing to shoulder   Sensation: intact to left upper extremity   Motor: firing biceps/triceps left upper extremity                         9.8    7.45  )-----------( 496      ( 21 Dec 2022 05:30 )             30.4   12-21    134<L>  |  102  |  10  ----------------------------<  109<H>  3.9   |  23  |  0.77    Ca    8.2<L>      21 Dec 2022 05:30        A/P: 52yMale POD#5 s/p left shoulder I+D   1. Pain control as needed  2. DVT prophylaxis:  lovenox   3. PT, weight-bearing status: wbat LUE   4. appreciate ID recommendations - currently on daptomycin - will require PICC placement for long term antibiotic therapy. Per ID can place PICC after 24 hours afebrile. Patient spike temp to 100.5 this AM.   5. Appreciate ID recommendations.

## 2022-12-22 NOTE — DISCHARGE NOTE PROVIDER - HOSPITAL COURSE
Admitted to [facility] cardiology service on 12/16/22  Surgery - left shoulder irrigation and debridement on 12/17/22, transferred to orthopedic service post-operatively   Janna-op Antibiotics  Pain control  DVT prophylaxis  OOB/occupational therapy  Infectious disease consult Admitted to [facility] cardiology service on 12/16/22  Surgery - left shoulder irrigation and debridement on 12/17/22, transferred to orthopedic service post-operatively   Janna-op Antibiotics  Pain control  DVT prophylaxis  OOB/occupational therapy  Infectious disease consult     PICC line placement Admitted to [facility] cardiology service on 12/16/22  Surgery - left shoulder irrigation and debridement on 12/17/22, transferred to orthopedic service post-operatively   Janna-op Antibiotics  Pain control  DVT prophylaxis  OOB/occupational therapy  PICC line placement     Cardiology-   51 y/o male with obesity, no known significant PMHx presented to [facility] ER 12/15 with atraumatic left shoulder pain and swelling x approximately 1 week, found to have septic L shoulder joint but in the ER was noted to be in SVT to 200s, admitted to cardiac tele for management of SVT and then transferred to Orthopedic service for washout on 12/17. Cardiology reconsulted for SVT this morning.     #SVT  Patient with no past history of SVT. 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 Was seen by EP and planned for possible outpatient ablation   - Tele: notable for SVT upto 170-180's initially. With vagal maneuver's --> noted to be in sinus tachycardia 140's-150's  - ECG: sinus tachycardia to 140's with first degree AVB  - TTE: Normal LV fx, mild LVH, trivial effusion  - Would check rectal temperature and rule out other causes such as PE   - Recommend lopressor 50mg BID   #HTN  - Continue nifedipine to 60mg qd   Cardiology to follow     Medicine recs-  52 year old male admitted for septic joint   Impression and Plan   # Sepsis   # Septic Left shoulder Joint s/p Washout 12/17   - IV Daptomycin , MRSA in Culture from OR ,   - Blood cx negative x 4 days   - repeat blood cx from 12/24 negative x 36 hours   - Venous duplex negative   - F/u ID recommendations   - Febrile 12/24   - other than left shoulder , no skin , pulm, gi , gu symptoms of infection   - PICC in right upper Extremity    # SVT -s/p adenosine , continue metoprolol xl and nifedipine   # Hyponatremia - asymptomatic , Na around 133- 135 consistently   # DVT prophylaxis - Lovenox     ID recs-  52M h/o obesity p/w L shoulder pain and swelling x 1 week, found to have L shoulder septic arthritis.  Now s/p washout on 12/17.  OR culture growing MRSA.  Still intermittently febrile, although fever curve downtrending and WBC normal.   Source of fever likely due to DVT vs L shoulder septic arthritis.  Case d/w Dr. Ozuna - will first r/o DVT and if still febrile and DVT neg, then L shoulder may need to be washed out.     - cont daptomycin 1000mg IV q24h  - obtain LUE Duplex to r/o DVT   - consider L shoulder aspiration or washout if DVT neg and he continues to spike fever  - Place single lumen PICC line once afebrile >24h  - Duration of antibiotics is 4 weeks ( 12/18/22 - 1/14/23 ) to treat septic arthritis of L shoulder   - Weekly labs: CMP, CBC, ESR, CRP, CK faxed to ID office at [phone]  - Patient to follow up with Dr. segovia in 2 weeks ([address] [place] [address], [phone]), ID office will call patient to schedule     12-23 PICC placement     LUE doppler- IMPRESSION:  No evidence of left upper extremity deep venous thrombosis.

## 2022-12-22 NOTE — DISCHARGE NOTE PROVIDER - NSDCMRMEDTOKEN_GEN_ALL_CORE_FT
DAPTOmycin 900 mg IV q24 hours (Start 12/18/22-01/14/23) :   heparin solution 100u/mL: 5mL flush per protocol :   saline flush: 5mL flush pre/post IV infusion :   Weekly labs CBC, CMP, ESR, CRP CK, fax results to  (Dr. Rojas, ID):    DAPTOmycin 1000mg IV q24 hours (Start 12/18/22-01/14/23) :   heparin solution 100u/mL: 5mL flush per protocol :   saline flush: 5mL flush pre/post IV infusion :   Weekly labs CBC, CMP, ESR, CRP CK, fax results to  (Dr. Rojas, ID):    acetaminophen 325 mg oral tablet: 3 tab(s) orally every 8 hours, As Needed  DAPTOmycin 500 mg intravenous injection: 1000 milligram(s) intravenous every 24 hours  metoprolol tartrate 50 mg oral tablet: 1 tab(s) orally 2 times a day MDD:2  NIFEdipine 60 mg oral tablet, extended release: 1 tab(s) orally once a day MDD:1  oxyCODONE 5 mg oral tablet: 1 tab(s) orally every 6 hours, As Needed -severe Pain (4 - 6) MDD:4  senna leaf extract oral tablet: 2 tab(s) orally once a day (at bedtime)

## 2022-12-22 NOTE — PROGRESS NOTE ADULT - ASSESSMENT
52M h/o obesity p/w L shoulder pain and swelling x 1 week, found to have L shoulder septic arthritis.  Now s/p washout on 12/17.  OR culture growing MRSA. Fever curve improving. Unable to abduct L shoulder.  - ongoing assessment of L shoulder exam to determine need for another washout  - trend CRP daily with AM labs for now  - continue daptomycin--adjust to 1g IV q24h  - anticipate single lumen PICC

## 2022-12-22 NOTE — DISCHARGE NOTE PROVIDER - NSDCCPCAREPLAN_GEN_ALL_CORE_FT
PRINCIPAL DISCHARGE DIAGNOSIS  Diagnosis: Septic arthritis of shoulder, left  Assessment and Plan of Treatment:       SECONDARY DISCHARGE DIAGNOSES  Diagnosis: Septic arthritis of shoulder, left  Assessment and Plan of Treatment:      PRINCIPAL DISCHARGE DIAGNOSIS  Diagnosis: Septic arthritis of shoulder, left  Assessment and Plan of Treatment: Left shoulder arthroscopy washout      SECONDARY DISCHARGE DIAGNOSES  Diagnosis: Septic arthritis of shoulder, left  Assessment and Plan of Treatment:

## 2022-12-22 NOTE — DISCHARGE NOTE PROVIDER - CARE PROVIDERS DIRECT ADDRESSES
,DirectAddress_Unknown,DirectAddress_Unknown ,DirectAddress_Unknown,DirectAddress_Unknown,[email or web address]

## 2022-12-23 LAB
ANION GAP SERPL CALC-SCNC: 10 MMOL/L — SIGNIFICANT CHANGE UP (ref 5–17)
BUN SERPL-MCNC: 11 MG/DL — SIGNIFICANT CHANGE UP (ref 7–23)
CALCIUM SERPL-MCNC: 8.8 MG/DL — SIGNIFICANT CHANGE UP (ref 8.4–10.5)
CHLORIDE SERPL-SCNC: 100 MMOL/L — SIGNIFICANT CHANGE UP (ref 96–108)
CO2 SERPL-SCNC: 25 MMOL/L — SIGNIFICANT CHANGE UP (ref 22–31)
CREAT SERPL-MCNC: 0.73 MG/DL — SIGNIFICANT CHANGE UP (ref 0.5–1.3)
CRP SERPL-MCNC: 236.2 MG/L — HIGH (ref 0–4)
EGFR: 109 ML/MIN/1.73M2 — SIGNIFICANT CHANGE UP
ERYTHROCYTE [SEDIMENTATION RATE] IN BLOOD: >130 MM/HR — HIGH
GLUCOSE SERPL-MCNC: 104 MG/DL — HIGH (ref 70–99)
HCT VFR BLD CALC: 29.5 % — LOW (ref 39–50)
HGB BLD-MCNC: 9.7 G/DL — LOW (ref 13–17)
MCHC RBC-ENTMCNC: 26.7 PG — LOW (ref 27–34)
MCHC RBC-ENTMCNC: 32.9 GM/DL — SIGNIFICANT CHANGE UP (ref 32–36)
MCV RBC AUTO: 81.3 FL — SIGNIFICANT CHANGE UP (ref 80–100)
NRBC # BLD: 0 /100 WBCS — SIGNIFICANT CHANGE UP (ref 0–0)
PLATELET # BLD AUTO: 535 K/UL — HIGH (ref 150–400)
POTASSIUM SERPL-MCNC: 3.7 MMOL/L — SIGNIFICANT CHANGE UP (ref 3.5–5.3)
POTASSIUM SERPL-SCNC: 3.7 MMOL/L — SIGNIFICANT CHANGE UP (ref 3.5–5.3)
RBC # BLD: 3.63 M/UL — LOW (ref 4.2–5.8)
RBC # FLD: 15.4 % — HIGH (ref 10.3–14.5)
SARS-COV-2 RNA SPEC QL NAA+PROBE: SIGNIFICANT CHANGE UP
SODIUM SERPL-SCNC: 135 MMOL/L — SIGNIFICANT CHANGE UP (ref 135–145)
WBC # BLD: 7.71 K/UL — SIGNIFICANT CHANGE UP (ref 3.8–10.5)
WBC # FLD AUTO: 7.71 K/UL — SIGNIFICANT CHANGE UP (ref 3.8–10.5)

## 2022-12-23 PROCEDURE — 99232 SBSQ HOSP IP/OBS MODERATE 35: CPT

## 2022-12-23 PROCEDURE — 36573 INSJ PICC RS&I 5 YR+: CPT

## 2022-12-23 RX ORDER — OXYCODONE HYDROCHLORIDE 5 MG/1
5 TABLET ORAL EVERY 4 HOURS
Refills: 0 | Status: DISCONTINUED | OUTPATIENT
Start: 2022-12-23 | End: 2022-12-27

## 2022-12-23 RX ORDER — OXYCODONE HYDROCHLORIDE 5 MG/1
10 TABLET ORAL EVERY 4 HOURS
Refills: 0 | Status: DISCONTINUED | OUTPATIENT
Start: 2022-12-23 | End: 2022-12-27

## 2022-12-23 RX ORDER — CHLORHEXIDINE GLUCONATE 213 G/1000ML
1 SOLUTION TOPICAL
Refills: 0 | Status: DISCONTINUED | OUTPATIENT
Start: 2022-12-23 | End: 2022-12-27

## 2022-12-23 RX ADMIN — DAPTOMYCIN 140 MILLIGRAM(S): 500 INJECTION, POWDER, LYOPHILIZED, FOR SOLUTION INTRAVENOUS at 22:33

## 2022-12-23 RX ADMIN — SENNA PLUS 2 TABLET(S): 8.6 TABLET ORAL at 22:34

## 2022-12-23 RX ADMIN — OXYCODONE HYDROCHLORIDE 10 MILLIGRAM(S): 5 TABLET ORAL at 12:00

## 2022-12-23 RX ADMIN — ENOXAPARIN SODIUM 40 MILLIGRAM(S): 100 INJECTION SUBCUTANEOUS at 05:58

## 2022-12-23 RX ADMIN — OXYCODONE HYDROCHLORIDE 10 MILLIGRAM(S): 5 TABLET ORAL at 21:39

## 2022-12-23 RX ADMIN — Medication 600 MILLIGRAM(S): at 18:37

## 2022-12-23 RX ADMIN — Medication 975 MILLIGRAM(S): at 22:34

## 2022-12-23 RX ADMIN — ENOXAPARIN SODIUM 40 MILLIGRAM(S): 100 INJECTION SUBCUTANEOUS at 18:38

## 2022-12-23 RX ADMIN — LACTULOSE 10 GRAM(S): 10 SOLUTION ORAL at 22:33

## 2022-12-23 RX ADMIN — Medication 600 MILLIGRAM(S): at 05:59

## 2022-12-23 RX ADMIN — Medication 975 MILLIGRAM(S): at 05:59

## 2022-12-23 RX ADMIN — Medication 600 MILLIGRAM(S): at 11:31

## 2022-12-23 RX ADMIN — Medication 600 MILLIGRAM(S): at 00:42

## 2022-12-23 RX ADMIN — Medication 600 MILLIGRAM(S): at 23:56

## 2022-12-23 RX ADMIN — OXYCODONE HYDROCHLORIDE 10 MILLIGRAM(S): 5 TABLET ORAL at 20:47

## 2022-12-23 RX ADMIN — Medication 60 MILLIGRAM(S): at 05:59

## 2022-12-23 RX ADMIN — Medication 600 MILLIGRAM(S): at 06:40

## 2022-12-23 RX ADMIN — OXYCODONE HYDROCHLORIDE 10 MILLIGRAM(S): 5 TABLET ORAL at 11:31

## 2022-12-23 RX ADMIN — Medication 50 MILLIGRAM(S): at 11:31

## 2022-12-23 RX ADMIN — MAGNESIUM HYDROXIDE 30 MILLILITER(S): 400 TABLET, CHEWABLE ORAL at 22:34

## 2022-12-23 RX ADMIN — Medication 975 MILLIGRAM(S): at 15:07

## 2022-12-23 RX ADMIN — Medication 975 MILLIGRAM(S): at 06:40

## 2022-12-23 RX ADMIN — Medication 600 MILLIGRAM(S): at 23:28

## 2022-12-23 RX ADMIN — Medication 975 MILLIGRAM(S): at 23:26

## 2022-12-23 NOTE — PROGRESS NOTE ADULT - ASSESSMENT
52 year old male admitted for septic joint     Impression and Plan   # Sepsis   # Septic Left shoulder Joint s/p Washout 12/17   - IV Daptomycin , MRSA in Culture from OR , Afebrile since 12/22 7 am   - Blood cx negative x 4 days   - tyelenol  and motrin for fever  - Venous duplex negative   - if afebrile for 24 hours can place PICC per ID recommendations     # SVT   -s/p adenosine , continue metoprolol xl and nifedipine     # Hyponatremia   - asymptomatic , Na around 133- 135 consistently     # DVT prophylaxis - Lovenox

## 2022-12-23 NOTE — PROGRESS NOTE ADULT - ASSESSMENT
52M h/o obesity p/w L shoulder pain and swelling x 1 week, found to have L shoulder septic arthritis.  Now s/p washout on 12/17.  OR culture growing MRSA. Fever curve improving. Unable to abduct L shoulder. Persistently elevated CRP despite targeted antibiotic therapy.  - ongoing assessment of L shoulder exam to determine need for another washout  - trend CRP daily with AM labs for now  - continue daptomycin 1g IV q24h  - anticipate single lumen PICC    Dr. Constantino to cover this evening thru Monday  ID Team 2

## 2022-12-23 NOTE — PROGRESS NOTE ADULT - SUBJECTIVE AND OBJECTIVE BOX
INTERVAL HPI/OVERNIGHT EVENTS: No more fevers. Still unable to move L shoulder.    CONSTITUTIONAL:  Negative fever or chills, feels well, good appetite  EYES:  Negative  blurry vision or double vision  CARDIOVASCULAR:  Negative for chest pain or palpitations  RESPIRATORY:  Negative for cough, wheezing, or SOB   GASTROINTESTINAL:  Negative for nausea, vomiting, diarrhea, constipation, or abdominal pain  GENITOURINARY:  Negative frequency, urgency or dysuria  NEUROLOGIC:  No headache, confusion, dizziness, lightheadedness      ANTIBIOTICS/RELEVANT:    MEDICATIONS  (STANDING):  acetaminophen     Tablet .. 975 milliGRAM(s) Oral every 8 hours  chlorhexidine 2% Cloths 1 Application(s) Topical <User Schedule>  DAPTOmycin IVPB 1000 milliGRAM(s) IV Intermittent every 24 hours  enoxaparin Injectable 40 milliGRAM(s) SubCutaneous every 12 hours  ibuprofen  Tablet. 600 milliGRAM(s) Oral every 6 hours  influenza   Vaccine 0.5 milliLiter(s) IntraMuscular once  lactulose Syrup 10 Gram(s) Oral at bedtime  magnesium hydroxide Suspension 30 milliLiter(s) Oral at bedtime  metoprolol succinate ER 50 milliGRAM(s) Oral every 24 hours  NIFEdipine XL 60 milliGRAM(s) Oral daily  ondansetron Injectable 4 milliGRAM(s) IV Push once  senna 2 Tablet(s) Oral at bedtime  sodium chloride 0.9%. 1000 milliLiter(s) (160 mL/Hr) IV Continuous <Continuous>    MEDICATIONS  (PRN):  benzocaine 15 mG/menthol 3.6 mG Lozenge 1 Lozenge Oral four times a day PRN Sore Throat  HYDROmorphone  Injectable 1 milliGRAM(s) IV Push every 4 hours PRN Severe Pain (7 - 10)  melatonin 5 milliGRAM(s) Oral at bedtime PRN Sleep  oxyCODONE    IR 10 milliGRAM(s) Oral every 4 hours PRN Severe Pain (7 - 10)  oxyCODONE    IR 5 milliGRAM(s) Oral every 4 hours PRN Moderate Pain (4 - 6)        Vital Signs Last 24 Hrs  T(C): 36.9 (23 Dec 2022 08:51), Max: 37.4 (23 Dec 2022 01:21)  T(F): 98.5 (23 Dec 2022 08:51), Max: 99.4 (23 Dec 2022 01:21)  HR: 100 (23 Dec 2022 08:51) (87 - 109)  BP: 130/72 (23 Dec 2022 08:51) (130/72 - 156/86)  BP(mean): --  RR: 18 (23 Dec 2022 08:51) (16 - 20)  SpO2: 97% (23 Dec 2022 08:51) (95% - 100%)    Parameters below as of 23 Dec 2022 08:51  Patient On (Oxygen Delivery Method): room air        PHYSICAL EXAM:  Constitutional: NAD  Eyes: FLORY, EOMI  Ear/Nose/Throat: no oral lesion, no sinus tenderness on percussion	  Neck: no JVD, no lymphadenopathy, supple  Respiratory: CTA rocio  Cardiovascular: S1S2 RRR, no murmurs  Gastrointestinal:soft, (+) BS, no HSM  Extremities: LUE edema; unable to abduct L shoulder  Vascular: DP Pulse:	right normal; left normal      LABS:                        9.7    7.71  )-----------( 535      ( 23 Dec 2022 05:30 )             29.5     12-23    135  |  100  |  11  ----------------------------<  104<H>  3.7   |  25  |  0.73    Ca    8.8      23 Dec 2022 05:30            MICROBIOLOGY: reviewed    RADIOLOGY & ADDITIONAL STUDIES: reviewed

## 2022-12-23 NOTE — PROGRESS NOTE ADULT - SUBJECTIVE AND OBJECTIVE BOX
Patient is a 52y old  Male who presents with a chief complaint of septic L shoulder (20 Dec 2022 12:14)        SUBJECTIVE:  Patient was seen and examined at bedside.    Overnight Events : last febrile episode  -7:05 .5        Review of systems: 12 point Review of systems negative unless otherwise documented elsewhere in note.     Diet, DASH/TLC:   Sodium & Cholesterol Restricted (22 @ 17:32) [Active]      MEDICATIONS:  MEDICATIONS  (STANDING):  acetaminophen     Tablet .. 975 milliGRAM(s) Oral every 8 hours  chlorhexidine 2% Cloths 1 Application(s) Topical <User Schedule>  DAPTOmycin IVPB 1000 milliGRAM(s) IV Intermittent every 24 hours  enoxaparin Injectable 40 milliGRAM(s) SubCutaneous every 12 hours  ibuprofen  Tablet. 600 milliGRAM(s) Oral every 6 hours  influenza   Vaccine 0.5 milliLiter(s) IntraMuscular once  lactulose Syrup 10 Gram(s) Oral at bedtime  magnesium hydroxide Suspension 30 milliLiter(s) Oral at bedtime  metoprolol succinate ER 50 milliGRAM(s) Oral every 24 hours  NIFEdipine XL 60 milliGRAM(s) Oral daily  ondansetron Injectable 4 milliGRAM(s) IV Push once  senna 2 Tablet(s) Oral at bedtime  sodium chloride 0.9%. 1000 milliLiter(s) (160 mL/Hr) IV Continuous <Continuous>    MEDICATIONS  (PRN):  benzocaine 15 mG/menthol 3.6 mG Lozenge 1 Lozenge Oral four times a day PRN Sore Throat  HYDROmorphone  Injectable 1 milliGRAM(s) IV Push every 4 hours PRN Severe Pain (7 - 10)  melatonin 5 milliGRAM(s) Oral at bedtime PRN Sleep  oxyCODONE    IR 5 milliGRAM(s) Oral every 4 hours PRN Moderate Pain (4 - 6)  oxyCODONE    IR 10 milliGRAM(s) Oral every 4 hours PRN Severe Pain (7 - 10)        Allergies    No Known Allergies    Intolerances        OBJECTIVE:  Vital Signs Last 24 Hrs  T(C): 36.9 (23 Dec 2022 08:51), Max: 37.4 (23 Dec 2022 01:21)  T(F): 98.5 (23 Dec 2022 08:51), Max: 99.4 (23 Dec 2022 01:21)  HR: 100 (23 Dec 2022 08:51) (87 - 109)  BP: 130/72 (23 Dec 2022 08:51) (130/72 - 156/86)  BP(mean): --  RR: 18 (23 Dec 2022 08:51) (16 - 20)  SpO2: 97% (23 Dec 2022 08:51) (95% - 100%)    Parameters below as of 23 Dec 2022 08:51  Patient On (Oxygen Delivery Method): room air                  PHYSICAL EXAM:  Gen: Resting in bed at time of exam, not in distress   HEENT: moist mucosa, no lesions   Neck: supple, trachea at midline  CV: RRR, +S1/S2  Pulm: no wheezing , no crackles  no increase in work of breathing  Abd: soft, NTND  Skin: warm and dry, no new rashes   Ext: left shoulder , arm , fore arm  ,wrist and hand swollen, warm , tender , Range of motion limited due to pain   Neuro: AOx3, no gross focal neurological deficits  Psych: affect and behavior appropriate, pleasant at time of interview    LABS:                                                         9.7    7.71  )-----------( 535      ( 23 Dec 2022 05:30 )             29.5     12-    135  |  100  |  11  ----------------------------<  104<H>  3.7   |  25  |  0.73    Ca    8.8      23 Dec 2022 05:30            Urinalysis Basic - ( 19 Dec 2022 04:15 )    Color: Yellow / Appearance: Clear / S.025 / pH: x  Gluc: x / Ketone: NEGATIVE  / Bili: Negative / Urobili: 2.0 E.U./dL   Blood: x / Protein: 30 mg/dL / Nitrite: NEGATIVE   Leuk Esterase: NEGATIVE / RBC: < 5 /HPF / WBC < 5 /HPF   Sq Epi: x / Non Sq Epi: 0-5 /HPF / Bacteria: Present /HPF          Culture - Blood (collected 18 Dec 2022 22:49)  Source: .Blood Blood  Preliminary Report (21 Dec 2022 01:00):    No growth at 2 days.    Culture - Blood (collected 18 Dec 2022 22:49)  Source: .Blood Blood  Preliminary Report (21 Dec 2022 01:00):    No growth at 2 days.          RADIOLOGY/OTHER STUDIES:

## 2022-12-23 NOTE — PROGRESS NOTE ADULT - SUBJECTIVE AND OBJECTIVE BOX
Ortho Progress Note    Procedure: L shoulder arthroscopic washout  Surgeon: Sulma    Pt comfortable without complaints, pain controlled. LUE swollen, doppler negative    Denies CP, SOB, N/V, numbness/tingling     Vital Signs Last 24 Hrs  T(C): 37.4 (23 Dec 2022 06:11), Max: 37.4 (23 Dec 2022 01:21)  T(F): 99.3 (23 Dec 2022 06:11), Max: 99.4 (23 Dec 2022 01:21)  HR: 98 (23 Dec 2022 06:11) (87 - 109)  BP: 134/71 (23 Dec 2022 06:11) (134/71 - 156/86)  BP(mean): --  RR: 16 (23 Dec 2022 06:11) (16 - 20)  SpO2: 96% (23 Dec 2022 06:11) (95% - 100%)    Parameters below as of 23 Dec 2022 06:11  Patient On (Oxygen Delivery Method): room air      Physical Exam:  General: Pt Alert and oriented, NAD  L shoulder DSG C/D/I  Pulses: 2+ radial pulses, wwp, cap refill <3 sec  Sensation: SILT in axillary/radial/median/ulnar distributions  Motor: axillary/AIN/PIN/ulnar firing. Pain with FF to 40       Culture - Abscess with Gram Stain (12.17.22 @ 09:23)    Gram Stain:   No organisms seen  Moderate White blood cells    -  Clindamycin: S 0.5    -  Daptomycin: S 1    -  Erythromycin: S <=0.25    -  Linezolid: S 2    -  Oxacillin: R >2    -  Rifampin: S <=1 Should not be used as monotherapy    -  Tetracycline: R >8    -  Trimethoprim/Sulfamethoxazole: S 2/38    -  Vancomycin: S 2    -  Vancomycin: S 1.5    Specimen Source: .Abscess 3. Left shoulder abscess    Culture Results:   Rare Methicillin Resistant Staphylococcus aureus  Result called to and read back by_ Ms. DINO Mckenzie RN  12/18/2022 15:12:12    Organism Identification: Methicillin resistant Staphylococcus aureus  Methicillin resistant Staphylococcus aureus    Organism: Methicillin resistant Staphylococcus aureus    Organism: Methicillin resistant Staphylococcus aureus    Method Type: ETEST    Method Type: GREGOR                            9.7    7.71  )-----------( 535      ( 23 Dec 2022 05:30 )             29.5       A/P: 52yMale s/p L shoulder arthroscopic washout  - Stable  - Pain Control  - f/u AM labs  - DVT ppx: SCDs  - Post op abx: dapto increased per ID recs  - PT, WBS: WBAT  - Dispo: pending PICC , must be afebrile for 24h

## 2022-12-24 LAB
CRP SERPL-MCNC: 216.8 MG/L — HIGH (ref 0–4)
CULTURE RESULTS: SIGNIFICANT CHANGE UP
CULTURE RESULTS: SIGNIFICANT CHANGE UP
FLUAV AG NPH QL: SIGNIFICANT CHANGE UP
FLUBV AG NPH QL: SIGNIFICANT CHANGE UP
RSV RNA NPH QL NAA+NON-PROBE: SIGNIFICANT CHANGE UP
SARS-COV-2 RNA SPEC QL NAA+PROBE: SIGNIFICANT CHANGE UP
SPECIMEN SOURCE: SIGNIFICANT CHANGE UP
SPECIMEN SOURCE: SIGNIFICANT CHANGE UP

## 2022-12-24 PROCEDURE — 99232 SBSQ HOSP IP/OBS MODERATE 35: CPT

## 2022-12-24 RX ADMIN — OXYCODONE HYDROCHLORIDE 5 MILLIGRAM(S): 5 TABLET ORAL at 09:40

## 2022-12-24 RX ADMIN — Medication 975 MILLIGRAM(S): at 05:49

## 2022-12-24 RX ADMIN — LACTULOSE 10 GRAM(S): 10 SOLUTION ORAL at 22:31

## 2022-12-24 RX ADMIN — ENOXAPARIN SODIUM 40 MILLIGRAM(S): 100 INJECTION SUBCUTANEOUS at 05:48

## 2022-12-24 RX ADMIN — Medication 600 MILLIGRAM(S): at 18:15

## 2022-12-24 RX ADMIN — OXYCODONE HYDROCHLORIDE 5 MILLIGRAM(S): 5 TABLET ORAL at 08:40

## 2022-12-24 RX ADMIN — Medication 50 MILLIGRAM(S): at 12:18

## 2022-12-24 RX ADMIN — Medication 975 MILLIGRAM(S): at 22:30

## 2022-12-24 RX ADMIN — DAPTOMYCIN 140 MILLIGRAM(S): 500 INJECTION, POWDER, LYOPHILIZED, FOR SOLUTION INTRAVENOUS at 22:30

## 2022-12-24 RX ADMIN — OXYCODONE HYDROCHLORIDE 10 MILLIGRAM(S): 5 TABLET ORAL at 15:54

## 2022-12-24 RX ADMIN — Medication 975 MILLIGRAM(S): at 13:58

## 2022-12-24 RX ADMIN — ENOXAPARIN SODIUM 40 MILLIGRAM(S): 100 INJECTION SUBCUTANEOUS at 18:15

## 2022-12-24 RX ADMIN — Medication 600 MILLIGRAM(S): at 23:41

## 2022-12-24 RX ADMIN — Medication 60 MILLIGRAM(S): at 05:49

## 2022-12-24 RX ADMIN — Medication 600 MILLIGRAM(S): at 12:18

## 2022-12-24 RX ADMIN — OXYCODONE HYDROCHLORIDE 10 MILLIGRAM(S): 5 TABLET ORAL at 16:54

## 2022-12-24 RX ADMIN — MAGNESIUM HYDROXIDE 30 MILLILITER(S): 400 TABLET, CHEWABLE ORAL at 22:31

## 2022-12-24 RX ADMIN — CHLORHEXIDINE GLUCONATE 1 APPLICATION(S): 213 SOLUTION TOPICAL at 05:49

## 2022-12-24 RX ADMIN — Medication 975 MILLIGRAM(S): at 06:00

## 2022-12-24 RX ADMIN — Medication 600 MILLIGRAM(S): at 05:48

## 2022-12-24 RX ADMIN — Medication 600 MILLIGRAM(S): at 06:00

## 2022-12-24 RX ADMIN — SENNA PLUS 2 TABLET(S): 8.6 TABLET ORAL at 22:31

## 2022-12-24 NOTE — PROGRESS NOTE ADULT - SUBJECTIVE AND OBJECTIVE BOX
INTERVAL HPI/OVERNIGHT EVENTS:  T 100.5 early this morning.  Is unable to abduct arm, currently 4/5 pain.      CONSTITUTIONAL:  No night sweats  EYES:  No photophobia or visual changes  CARDIOVASCULAR:  No chest pain  RESPIRATORY:  No cough, wheezing, or SOB   GASTROINTESTINAL:  No nausea, vomiting, diarrhea, constipation, or abdominal pain  GENITOURINARY:  No frequency, urgency, dysuria or hematuria  NEUROLOGIC:  No headache, lightheadedness      ANTIBIOTICS/RELEVANT:          Vital Signs Last 24 Hrs  T(C): 36.5 (24 Dec 2022 17:36), Max: 38.1 (24 Dec 2022 05:01)  T(F): 97.7 (24 Dec 2022 17:36), Max: 100.5 (24 Dec 2022 05:01)  HR: 87 (24 Dec 2022 17:36) (87 - 110)  BP: 134/78 (24 Dec 2022 17:36) (124/91 - 159/81)  BP(mean): --  RR: 18 (24 Dec 2022 17:36) (16 - 18)  SpO2: 95% (24 Dec 2022 17:36) (95% - 98%)    Parameters below as of 24 Dec 2022 17:36  Patient On (Oxygen Delivery Method): room air        PHYSICAL EXAM:  Constitutional:  Awake, conversant  Eyes:  Sclerae anicteric, conjunctivae clear, PERRL  Ear/Nose/Throat:  No nasal exudate or sinus tenderness;  No buccal mucosal lesions, no pharyngeal erythema or exudate	  Neck:  Supple, no adenopathy  Respiratory:  Clear bilaterally  Cardiovascular:  RRR, S1S2, no murmur appreciated  Gastrointestinal:  Symmetric, normoactive BS, soft, NT, no masses, guarding or rebound.  No HSM  Extremities:  No edema.  L shoulder wrapped - unable to abduct      LABS:                        9.7    7.71  )-----------( 535      ( 23 Dec 2022 05:30 )             29.5         12-23    135  |  100  |  11  ----------------------------<  104<H>  3.7   |  25  |  0.73    Ca    8.8      23 Dec 2022 05:30      C-Reactive Protein, Serum: 216.8 mg/L (12.24.22 @ 10:00)  C-Reactive Protein, Serum: 236.2 mg/L (12.23.22 @ 05:30)        MICROBIOLOGY:        RADIOLOGY & ADDITIONAL STUDIES:

## 2022-12-24 NOTE — PROGRESS NOTE ADULT - ASSESSMENT
52M h/o obesity p/w L shoulder pain and swelling x 1 week, found to have L shoulder septic arthritis.  Now s/p washout on 12/17.  OR culture growing MRSA. Fever curve had been improving but again with fever this morning. Unable to abduct L shoulder; LUE edema. Persistently elevated CRP despite targeted antibiotic therapy.  - monitor for recurrent fever  - ongoing assessment of L shoulder exam to determine need for another washout  - trend CRP daily with AM labs for now  - continue daptomycin 1g IV q24h  - anticipate single lumen PICC  Recommendations discussed with primary team.  Will follow with you -team 2.

## 2022-12-24 NOTE — PROGRESS NOTE ADULT - SUBJECTIVE AND OBJECTIVE BOX
Patient is a 52y old  Male who presents with a chief complaint of septic L shoulder (20 Dec 2022 12:14)        SUBJECTIVE:  Patient was seen and examined at bedside.    Overnight Events : Unable to move his left shoulder , had an episode of fever this morning, has no resp, gi , gu , symptoms       Review of systems: 12 point Review of systems negative unless otherwise documented elsewhere in note.     Diet, DASH/TLC:   Sodium & Cholesterol Restricted (22 @ 17:32) [Active]      MEDICATIONS:  MEDICATIONS  (STANDING):  acetaminophen     Tablet .. 975 milliGRAM(s) Oral every 8 hours  chlorhexidine 2% Cloths 1 Application(s) Topical <User Schedule>  DAPTOmycin IVPB 1000 milliGRAM(s) IV Intermittent every 24 hours  enoxaparin Injectable 40 milliGRAM(s) SubCutaneous every 12 hours  ibuprofen  Tablet. 600 milliGRAM(s) Oral every 6 hours  influenza   Vaccine 0.5 milliLiter(s) IntraMuscular once  lactulose Syrup 10 Gram(s) Oral at bedtime  magnesium hydroxide Suspension 30 milliLiter(s) Oral at bedtime  metoprolol succinate ER 50 milliGRAM(s) Oral every 24 hours  NIFEdipine XL 60 milliGRAM(s) Oral daily  ondansetron Injectable 4 milliGRAM(s) IV Push once  senna 2 Tablet(s) Oral at bedtime  sodium chloride 0.9%. 1000 milliLiter(s) (160 mL/Hr) IV Continuous <Continuous>    MEDICATIONS  (PRN):  benzocaine 15 mG/menthol 3.6 mG Lozenge 1 Lozenge Oral four times a day PRN Sore Throat  HYDROmorphone  Injectable 1 milliGRAM(s) IV Push every 4 hours PRN Severe Pain (7 - 10)  melatonin 5 milliGRAM(s) Oral at bedtime PRN Sleep  oxyCODONE    IR 5 milliGRAM(s) Oral every 4 hours PRN Moderate Pain (4 - 6)  oxyCODONE    IR 10 milliGRAM(s) Oral every 4 hours PRN Severe Pain (7 - 10)        Allergies    No Known Allergies    Intolerances        OBJECTIVE:  Vital Signs Last 24 Hrs  T(C): 36.8 (24 Dec 2022 12:24), Max: 38.1 (24 Dec 2022 05:01)  T(F): 98.2 (24 Dec 2022 12:24), Max: 100.5 (24 Dec 2022 05:01)  HR: 98 (24 Dec 2022 12:24) (88 - 110)  BP: 124/91 (24 Dec 2022 12:24) (124/91 - 159/81)  BP(mean): --  RR: 16 (24 Dec 2022 12:24) (16 - 18)  SpO2: 98% (24 Dec 2022 12:24) (97% - 100%)    Parameters below as of 24 Dec 2022 12:24  Patient On (Oxygen Delivery Method): room air                      PHYSICAL EXAM:  Gen: Resting in bed at time of exam, not in distress   HEENT: moist mucosa, no lesions   Neck: supple, trachea at midline  CV: RRR, +S1/S2  Pulm: no wheezing , no crackles  no increase in work of breathing  Abd: soft, NTND  Skin: warm and dry, no new rashes   Ext: left shoulder , arm , fore arm  ,wrist and hand swollen, warm , tender , Range of motion limited due to pain   Neuro: AOx3, no gross focal neurological deficits  Psych: affect and behavior appropriate, pleasant at time of interview    LABS:                                                         9.7    7.71  )-----------( 535      ( 23 Dec 2022 05:30 )             29.5     12-    135  |  100  |  11  ----------------------------<  104<H>  3.7   |  25  |  0.73    Ca    8.8      23 Dec 2022 05:30            Urinalysis Basic - ( 19 Dec 2022 04:15 )    Color: Yellow / Appearance: Clear / S.025 / pH: x  Gluc: x / Ketone: NEGATIVE  / Bili: Negative / Urobili: 2.0 E.U./dL   Blood: x / Protein: 30 mg/dL / Nitrite: NEGATIVE   Leuk Esterase: NEGATIVE / RBC: < 5 /HPF / WBC < 5 /HPF   Sq Epi: x / Non Sq Epi: 0-5 /HPF / Bacteria: Present /HPF          Culture - Blood (collected 18 Dec 2022 22:49)  Source: .Blood Blood  Preliminary Report (21 Dec 2022 01:00):    No growth at 2 days.    Culture - Blood (collected 18 Dec 2022 22:49)  Source: .Blood Blood  Preliminary Report (21 Dec 2022 01:00):    No growth at 2 days.          RADIOLOGY/OTHER STUDIES:

## 2022-12-24 NOTE — PROGRESS NOTE ADULT - ASSESSMENT
52 year old male admitted for septic joint     Impression and Plan   # Sepsis   # Septic Left shoulder Joint s/p Washout 12/17   - IV Daptomycin , MRSA in Culture from OR ,   - Blood cx negative x 4 days   - tyelenol  and motrin for fever  - Venous duplex negative   - F/u ID recommendations   - Febrile 12/24   - other than left shoulder , no skin , pulm, gi , gu symptoms of infection     # SVT   -s/p adenosine , continue metoprolol xl and nifedipine     # Hyponatremia   - asymptomatic , Na around 133- 135 consistently     # DVT prophylaxis - Lovenox

## 2022-12-24 NOTE — PROGRESS NOTE ADULT - SUBJECTIVE AND OBJECTIVE BOX
Ortho Progress Note    Procedure: L shoulder arthroscopic washout  Surgeon: Sulma    Pt comfortable without complaints, pain controlled.   Denies CP, SOB, N/V, numbness/tingling     Vital Signs Last 24 Hrs  T(C): 38.1 (24 Dec 2022 05:01), Max: 38.1 (24 Dec 2022 05:01)  T(F): 100.5 (24 Dec 2022 05:01), Max: 100.5 (24 Dec 2022 05:01)  HR: 110 (24 Dec 2022 05:01) (88 - 110)  BP: 159/81 (24 Dec 2022 05:01) (128/75 - 159/81)  BP(mean): --  RR: 18 (24 Dec 2022 05:01) (17 - 18)  SpO2: 97% (24 Dec 2022 05:01) (95% - 100%)    Parameters below as of 24 Dec 2022 05:01  Patient On (Oxygen Delivery Method): room air          Physical Exam:  General: Pt Alert and oriented, NAD  L shoulder DSG C/D/I  Pulses: 2+ radial pulses, wwp, cap refill <3 sec  Sensation: SILT in axillary/radial/median/ulnar distributions  Motor: axillary/AIN/PIN/ulnar firing. Pain with FF to 40           A/P: 52yMale s/p L shoulder arthroscopic washout  - Stable  - Pain Control  - f/u AM labs  - DVT ppx: SCDs  - Post op abx: dapto increased per ID recs  - PT, WBS: WBAT  - Dispo: pending PICC , must be afebrile for 24h - febrile again 12/24

## 2022-12-25 LAB
ANION GAP SERPL CALC-SCNC: 8 MMOL/L — SIGNIFICANT CHANGE UP (ref 5–17)
BUN SERPL-MCNC: 12 MG/DL — SIGNIFICANT CHANGE UP (ref 7–23)
CALCIUM SERPL-MCNC: 9.2 MG/DL — SIGNIFICANT CHANGE UP (ref 8.4–10.5)
CHLORIDE SERPL-SCNC: 101 MMOL/L — SIGNIFICANT CHANGE UP (ref 96–108)
CO2 SERPL-SCNC: 27 MMOL/L — SIGNIFICANT CHANGE UP (ref 22–31)
CREAT SERPL-MCNC: 0.82 MG/DL — SIGNIFICANT CHANGE UP (ref 0.5–1.3)
EGFR: 106 ML/MIN/1.73M2 — SIGNIFICANT CHANGE UP
GLUCOSE SERPL-MCNC: 112 MG/DL — HIGH (ref 70–99)
POTASSIUM SERPL-MCNC: 4.4 MMOL/L — SIGNIFICANT CHANGE UP (ref 3.5–5.3)
POTASSIUM SERPL-SCNC: 4.4 MMOL/L — SIGNIFICANT CHANGE UP (ref 3.5–5.3)
SODIUM SERPL-SCNC: 136 MMOL/L — SIGNIFICANT CHANGE UP (ref 135–145)

## 2022-12-25 PROCEDURE — 99232 SBSQ HOSP IP/OBS MODERATE 35: CPT

## 2022-12-25 RX ADMIN — Medication 600 MILLIGRAM(S): at 11:10

## 2022-12-25 RX ADMIN — Medication 975 MILLIGRAM(S): at 05:41

## 2022-12-25 RX ADMIN — DAPTOMYCIN 140 MILLIGRAM(S): 500 INJECTION, POWDER, LYOPHILIZED, FOR SOLUTION INTRAVENOUS at 22:16

## 2022-12-25 RX ADMIN — Medication 60 MILLIGRAM(S): at 05:42

## 2022-12-25 RX ADMIN — ENOXAPARIN SODIUM 40 MILLIGRAM(S): 100 INJECTION SUBCUTANEOUS at 17:02

## 2022-12-25 RX ADMIN — Medication 600 MILLIGRAM(S): at 17:01

## 2022-12-25 RX ADMIN — CHLORHEXIDINE GLUCONATE 1 APPLICATION(S): 213 SOLUTION TOPICAL at 09:47

## 2022-12-25 RX ADMIN — OXYCODONE HYDROCHLORIDE 10 MILLIGRAM(S): 5 TABLET ORAL at 11:15

## 2022-12-25 RX ADMIN — ENOXAPARIN SODIUM 40 MILLIGRAM(S): 100 INJECTION SUBCUTANEOUS at 05:42

## 2022-12-25 RX ADMIN — OXYCODONE HYDROCHLORIDE 10 MILLIGRAM(S): 5 TABLET ORAL at 12:15

## 2022-12-25 RX ADMIN — Medication 975 MILLIGRAM(S): at 22:14

## 2022-12-25 RX ADMIN — Medication 975 MILLIGRAM(S): at 15:28

## 2022-12-25 RX ADMIN — Medication 50 MILLIGRAM(S): at 11:10

## 2022-12-25 RX ADMIN — Medication 600 MILLIGRAM(S): at 05:41

## 2022-12-25 NOTE — PROGRESS NOTE ADULT - ASSESSMENT
52M h/o obesity p/w L shoulder pain and swelling x 1 week, found to have L shoulder septic arthritis.  Now s/p washout on 12/17.  OR culture growing MRSA. Fever curve had been improving but again with fever this morning. Unable to abduct L shoulder; LUE edema. Had low grade fever X 1 on 12/24.  Persistently elevated CRP despite targeted antibiotic therapy.  - monitor for recurrent fever  - F/U blood culture X 1 from 12/24  - ongoing assessment of L shoulder exam to determine need for another washout  - trend CRP daily with AM labs for now  - continue daptomycin 1g IV q24h  Recommendations discussed with primary team.  Will follow with you -team 2.  I will cover through 12/26, Dr. France will resume care 12/27.

## 2022-12-25 NOTE — PROGRESS NOTE ADULT - SUBJECTIVE AND OBJECTIVE BOX
Patient is a 52y old  Male who presents with a chief complaint of septic L shoulder (20 Dec 2022 12:14)        SUBJECTIVE:  Patient was seen and examined at bedside.    Overnight Events : Unable to move his left shoulder , last febrile episode 5 am on  ,  , has no resp, gi , gu , symptoms       Review of systems: 12 point Review of systems negative unless otherwise documented elsewhere in note.     Diet, DASH/TLC:   Sodium & Cholesterol Restricted (22 @ 17:32) [Active]      MEDICATIONS:  MEDICATIONS  (STANDING):  acetaminophen     Tablet .. 975 milliGRAM(s) Oral every 8 hours  chlorhexidine 2% Cloths 1 Application(s) Topical <User Schedule>  DAPTOmycin IVPB 1000 milliGRAM(s) IV Intermittent every 24 hours  enoxaparin Injectable 40 milliGRAM(s) SubCutaneous every 12 hours  ibuprofen  Tablet. 600 milliGRAM(s) Oral every 6 hours  influenza   Vaccine 0.5 milliLiter(s) IntraMuscular once  lactulose Syrup 10 Gram(s) Oral at bedtime  magnesium hydroxide Suspension 30 milliLiter(s) Oral at bedtime  metoprolol succinate ER 50 milliGRAM(s) Oral every 24 hours  NIFEdipine XL 60 milliGRAM(s) Oral daily  ondansetron Injectable 4 milliGRAM(s) IV Push once  senna 2 Tablet(s) Oral at bedtime  sodium chloride 0.9%. 1000 milliLiter(s) (160 mL/Hr) IV Continuous <Continuous>    MEDICATIONS  (PRN):  benzocaine 15 mG/menthol 3.6 mG Lozenge 1 Lozenge Oral four times a day PRN Sore Throat  HYDROmorphone  Injectable 1 milliGRAM(s) IV Push every 4 hours PRN Severe Pain (7 - 10)  melatonin 5 milliGRAM(s) Oral at bedtime PRN Sleep  oxyCODONE    IR 5 milliGRAM(s) Oral every 4 hours PRN Moderate Pain (4 - 6)  oxyCODONE    IR 10 milliGRAM(s) Oral every 4 hours PRN Severe Pain (7 - 10)        Allergies    No Known Allergies    Intolerances        OBJECTIVE:  Vital Signs Last 24 Hrs  T(C): 36.4 (25 Dec 2022 08:08), Max: 36.8 (24 Dec 2022 20:11)  T(F): 97.5 (25 Dec 2022 08:08), Max: 98.2 (24 Dec 2022 20:11)  HR: 92 (25 Dec 2022 11:50) (85 - 96)  BP: 145/85 (25 Dec 2022 11:50) (133/82 - 145/85)  BP(mean): --  RR: 17 (25 Dec 2022 11:50) (16 - 19)  SpO2: 97% (25 Dec 2022 11:50) (95% - 99%)    Parameters below as of 25 Dec 2022 11:50  Patient On (Oxygen Delivery Method): room air                        PHYSICAL EXAM:  Gen: Resting in bed at time of exam, not in distress   HEENT: moist mucosa, no lesions   Neck: supple, trachea at midline  CV: RRR, +S1/S2  Pulm: no wheezing , no crackles  no increase in work of breathing  Abd: soft, NTND  Skin: warm and dry, no new rashes   Ext: left shoulder , arm , fore arm  ,wrist and hand swollen, warm , tender , Range of motion limited due to pain   Neuro: AOx3, no gross focal neurological deficits  Psych: affect and behavior appropriate, pleasant at time of interview    LABS:             No new labs today             Urinalysis Basic - ( 19 Dec 2022 04:15 )    Color: Yellow / Appearance: Clear / S.025 / pH: x  Gluc: x / Ketone: NEGATIVE  / Bili: Negative / Urobili: 2.0 E.U./dL   Blood: x / Protein: 30 mg/dL / Nitrite: NEGATIVE   Leuk Esterase: NEGATIVE / RBC: < 5 /HPF / WBC < 5 /HPF   Sq Epi: x / Non Sq Epi: 0-5 /HPF / Bacteria: Present /HPF          Culture - Blood (collected 18 Dec 2022 22:49)  Source: .Blood Blood  Preliminary Report (21 Dec 2022 01:00):    No growth at 2 days.    Culture - Blood (collected 18 Dec 2022 22:49)  Source: .Blood Blood  Preliminary Report (21 Dec 2022 01:00):    No growth at 2 days.          RADIOLOGY/OTHER STUDIES:

## 2022-12-25 NOTE — PROGRESS NOTE ADULT - SUBJECTIVE AND OBJECTIVE BOX
Ortho Progress Note    Procedure: L shoulder arthroscopic washout  Surgeon: Sulma    Pt comfortable without complaints, pain controlled.   Denies CP, SOB, N/V, numbness/tingling     Vital Signs Last 24 Hrs  T(C): 36.5 (25 Dec 2022 04:47), Max: 37 (24 Dec 2022 08:37)  T(F): 97.7 (25 Dec 2022 04:47), Max: 98.6 (24 Dec 2022 08:37)  HR: 90 (25 Dec 2022 04:47) (87 - 103)  BP: 133/82 (25 Dec 2022 04:47) (124/91 - 139/85)  BP(mean): --  RR: 18 (25 Dec 2022 04:47) (16 - 19)  SpO2: 96% (25 Dec 2022 04:47) (95% - 99%)    Parameters below as of 25 Dec 2022 04:47  Patient On (Oxygen Delivery Method): room air    Physical Exam:  General: Pt Alert and oriented, NAD  L shoulder DSG C/D/I  Pulses: 2+ radial pulses, wwp, cap refill <3 sec  Sensation: SILT in axillary/radial/median/ulnar distributions  Motor: axillary/AIN/PIN/ulnar firing. Pain with FF to 40           A/P: 52yMale s/p L shoulder arthroscopic washout. 24 hours afebrile, will follow up labs in AM.   - Stable  - Pain Control  - f/u AM labs  - DVT ppx: SCDs  - Post op abx: dapto increased per ID recs  - PT, WBS: WBAT  - Dispo: pending PICC , must be afebrile for 24h

## 2022-12-25 NOTE — PROGRESS NOTE ADULT - SUBJECTIVE AND OBJECTIVE BOX
INTERVAL HPI/OVERNIGHT EVENTS:  Coverage for Dr. [place].  Tm 98.2.  Remains unable to move his shoulder.  Pain is 5/10 in severity    CONSTITUTIONAL:  No fever, chills, night sweats  EYES:  No photophobia or visual changes  CARDIOVASCULAR:  No chest pain  RESPIRATORY:  No cough, wheezing, or SOB   GASTROINTESTINAL:  No nausea, vomiting, diarrhea, constipation, or abdominal pain  GENITOURINARY:  No frequency, urgency, dysuria or hematuria  NEUROLOGIC:  No headache, lightheadedness      ANTIBIOTICS/RELEVANT:          Vital Signs Last 24 Hrs  T(C): 36.4 (25 Dec 2022 11:50), Max: 36.8 (24 Dec 2022 20:11)  T(F): 97.6 (25 Dec 2022 11:50), Max: 98.2 (24 Dec 2022 20:11)  HR: 91 (25 Dec 2022 19:22) (85 - 96)  BP: 131/75 (25 Dec 2022 19:22) (131/75 - 145/85)  BP(mean): --  RR: 18 (25 Dec 2022 19:22) (16 - 19)  SpO2: 99% (25 Dec 2022 19:22) (96% - 99%)    Parameters below as of 25 Dec 2022 19:22  Patient On (Oxygen Delivery Method): room air        PHYSICAL EXAM:  Constitutional:  Awake, conversant  Eyes:  Sclerae anicteric, conjunctivae clear, PERRL  Ear/Nose/Throat:  No nasal exudate or sinus tenderness;  No buccal mucosal lesions, no pharyngeal erythema or exudate	  Neck:  Supple, no adenopathy  Respiratory:  Clear bilaterally  Cardiovascular:  RRR, S1S2, no murmur appreciated  Gastrointestinal:  Symmetric, normoactive BS, soft, NT, no masses, guarding or rebound.  No HSM  Extremities:  No edema.  L shoulder wrapped - unable to abduct.  RUE PICC    LABS:        12-25    136  |  101  |  12  ----------------------------<  112<H>  4.4   |  27  |  0.82    Ca    9.2      25 Dec 2022 07:31            MICROBIOLOGY:  Culture Results:   No growth at 12 hours (12-24-22 @ 23:28)        RADIOLOGY & ADDITIONAL STUDIES:

## 2022-12-25 NOTE — PROGRESS NOTE ADULT - ASSESSMENT
52 year old male admitted for septic joint     Impression and Plan   # Sepsis   # Septic Left shoulder Joint s/p Washout 12/17   - IV Daptomycin , MRSA in Culture from OR ,   - Blood cx negative x 4 days   - repeat blood cx from 12/24 negative x 12 hours   - Venous duplex negative   - F/u ID recommendations   - Febrile 12/24   - other than left shoulder , no skin , pulm, gi , gu symptoms of infection   - Needs PICC , can place if afebrile for 48 hours , confirm with ID     # SVT   -s/p adenosine , continue metoprolol xl and nifedipine     # Hyponatremia   - asymptomatic , Na around 133- 135 consistently     # DVT prophylaxis - Lovenox

## 2022-12-26 LAB
ANION GAP SERPL CALC-SCNC: 12 MMOL/L — SIGNIFICANT CHANGE UP (ref 5–17)
BUN SERPL-MCNC: 14 MG/DL — SIGNIFICANT CHANGE UP (ref 7–23)
CALCIUM SERPL-MCNC: 8.7 MG/DL — SIGNIFICANT CHANGE UP (ref 8.4–10.5)
CHLORIDE SERPL-SCNC: 103 MMOL/L — SIGNIFICANT CHANGE UP (ref 96–108)
CO2 SERPL-SCNC: 22 MMOL/L — SIGNIFICANT CHANGE UP (ref 22–31)
CREAT SERPL-MCNC: 0.84 MG/DL — SIGNIFICANT CHANGE UP (ref 0.5–1.3)
CRP SERPL-MCNC: 105.6 MG/L — HIGH (ref 0–4)
EGFR: 105 ML/MIN/1.73M2 — SIGNIFICANT CHANGE UP
GLUCOSE SERPL-MCNC: 146 MG/DL — HIGH (ref 70–99)
HCT VFR BLD CALC: 31.2 % — LOW (ref 39–50)
HGB BLD-MCNC: 9.9 G/DL — LOW (ref 13–17)
MCHC RBC-ENTMCNC: 26.5 PG — LOW (ref 27–34)
MCHC RBC-ENTMCNC: 31.7 GM/DL — LOW (ref 32–36)
MCV RBC AUTO: 83.6 FL — SIGNIFICANT CHANGE UP (ref 80–100)
NRBC # BLD: 0 /100 WBCS — SIGNIFICANT CHANGE UP (ref 0–0)
PLATELET # BLD AUTO: 669 K/UL — HIGH (ref 150–400)
POTASSIUM SERPL-MCNC: 3.7 MMOL/L — SIGNIFICANT CHANGE UP (ref 3.5–5.3)
POTASSIUM SERPL-SCNC: 3.7 MMOL/L — SIGNIFICANT CHANGE UP (ref 3.5–5.3)
RBC # BLD: 3.73 M/UL — LOW (ref 4.2–5.8)
RBC # FLD: 15.1 % — HIGH (ref 10.3–14.5)
SODIUM SERPL-SCNC: 137 MMOL/L — SIGNIFICANT CHANGE UP (ref 135–145)
WBC # BLD: 6.77 K/UL — SIGNIFICANT CHANGE UP (ref 3.8–10.5)
WBC # FLD AUTO: 6.77 K/UL — SIGNIFICANT CHANGE UP (ref 3.8–10.5)

## 2022-12-26 PROCEDURE — 99232 SBSQ HOSP IP/OBS MODERATE 35: CPT

## 2022-12-26 RX ADMIN — Medication 975 MILLIGRAM(S): at 05:20

## 2022-12-26 RX ADMIN — SENNA PLUS 2 TABLET(S): 8.6 TABLET ORAL at 21:36

## 2022-12-26 RX ADMIN — Medication 600 MILLIGRAM(S): at 00:05

## 2022-12-26 RX ADMIN — Medication 50 MILLIGRAM(S): at 11:29

## 2022-12-26 RX ADMIN — ENOXAPARIN SODIUM 40 MILLIGRAM(S): 100 INJECTION SUBCUTANEOUS at 05:21

## 2022-12-26 RX ADMIN — MAGNESIUM HYDROXIDE 30 MILLILITER(S): 400 TABLET, CHEWABLE ORAL at 21:37

## 2022-12-26 RX ADMIN — CHLORHEXIDINE GLUCONATE 1 APPLICATION(S): 213 SOLUTION TOPICAL at 09:51

## 2022-12-26 RX ADMIN — ENOXAPARIN SODIUM 40 MILLIGRAM(S): 100 INJECTION SUBCUTANEOUS at 17:32

## 2022-12-26 RX ADMIN — Medication 600 MILLIGRAM(S): at 11:29

## 2022-12-26 RX ADMIN — Medication 600 MILLIGRAM(S): at 17:32

## 2022-12-26 RX ADMIN — Medication 975 MILLIGRAM(S): at 21:36

## 2022-12-26 RX ADMIN — Medication 975 MILLIGRAM(S): at 14:07

## 2022-12-26 RX ADMIN — OXYCODONE HYDROCHLORIDE 5 MILLIGRAM(S): 5 TABLET ORAL at 03:52

## 2022-12-26 RX ADMIN — OXYCODONE HYDROCHLORIDE 10 MILLIGRAM(S): 5 TABLET ORAL at 15:00

## 2022-12-26 RX ADMIN — Medication 600 MILLIGRAM(S): at 13:11

## 2022-12-26 RX ADMIN — DAPTOMYCIN 140 MILLIGRAM(S): 500 INJECTION, POWDER, LYOPHILIZED, FOR SOLUTION INTRAVENOUS at 22:48

## 2022-12-26 RX ADMIN — OXYCODONE HYDROCHLORIDE 10 MILLIGRAM(S): 5 TABLET ORAL at 14:43

## 2022-12-26 RX ADMIN — Medication 600 MILLIGRAM(S): at 05:20

## 2022-12-26 RX ADMIN — LACTULOSE 10 GRAM(S): 10 SOLUTION ORAL at 21:37

## 2022-12-26 RX ADMIN — OXYCODONE HYDROCHLORIDE 5 MILLIGRAM(S): 5 TABLET ORAL at 03:06

## 2022-12-26 RX ADMIN — Medication 60 MILLIGRAM(S): at 05:20

## 2022-12-26 RX ADMIN — Medication 600 MILLIGRAM(S): at 18:20

## 2022-12-26 RX ADMIN — Medication 975 MILLIGRAM(S): at 13:32

## 2022-12-26 NOTE — PROGRESS NOTE ADULT - SUBJECTIVE AND OBJECTIVE BOX
Ortho Progress Note    Procedure: L shoulder arthroscopic washout  Surgeon: Sulma    Pt comfortable without complaints, pain controlled. Afebrile the past 2 days.  Denies CP, SOB, N/V, numbness/tingling     Vital Signs Last 24 Hrs  T(C): 36.6 (26 Dec 2022 04:51), Max: 36.8 (25 Dec 2022 20:20)  T(F): 97.9 (26 Dec 2022 04:51), Max: 98.2 (25 Dec 2022 20:20)  HR: 92 (26 Dec 2022 04:51) (82 - 92)  BP: 131/88 (26 Dec 2022 04:51) (126/84 - 145/85)  BP(mean): --  RR: 18 (26 Dec 2022 04:51) (16 - 18)  SpO2: 92% (26 Dec 2022 04:51) (92% - 99%)    Parameters below as of 26 Dec 2022 04:51  Patient On (Oxygen Delivery Method): room air      Physical Exam:  General: Pt Alert and oriented, NAD  L shoulder DSG C/D/I  Pulses: 2+ radial pulses, wwp, cap refill <3 sec  Sensation: SILT in axillary/radial/median/ulnar distributions  Motor: axillary/AIN/PIN/ulnar firing.  PROM 90 FF, 80 Abd  AROM 40 FF, 40 abd 2/2 pain           A/P: 52y Male s/p L shoulder arthroscopic washout. 48 hours afebrile, will follow up labs in AM.   - Stable  - Pain Control  - f/u AM labs  - DVT ppx: SCDs  - Post op abx: dapto increased per ID recs  - PT, WBS: WBAT  - Trend Daily CRP  - Dispo: pending Home infusion services and ID clearance

## 2022-12-26 NOTE — PROGRESS NOTE ADULT - SUBJECTIVE AND OBJECTIVE BOX
Patient is a 52y old  Male who presents with a chief complaint of septic L shoulder (20 Dec 2022 12:14)        SUBJECTIVE:  Patient was seen and examined at bedside.    Overnight Events : Unable to move his left shoulder , last febrile episode 5 am on  ,  , has no resp, gi , gu , symptoms       Review of systems: 12 point Review of systems negative unless otherwise documented elsewhere in note.     Diet, DASH/TLC:   Sodium & Cholesterol Restricted (22 @ 17:32) [Active]      MEDICATIONS:  MEDICATIONS  (STANDING):  acetaminophen     Tablet .. 975 milliGRAM(s) Oral every 8 hours  chlorhexidine 2% Cloths 1 Application(s) Topical <User Schedule>  DAPTOmycin IVPB 1000 milliGRAM(s) IV Intermittent every 24 hours  enoxaparin Injectable 40 milliGRAM(s) SubCutaneous every 12 hours  ibuprofen  Tablet. 600 milliGRAM(s) Oral every 6 hours  influenza   Vaccine 0.5 milliLiter(s) IntraMuscular once  lactulose Syrup 10 Gram(s) Oral at bedtime  magnesium hydroxide Suspension 30 milliLiter(s) Oral at bedtime  metoprolol succinate ER 50 milliGRAM(s) Oral every 24 hours  NIFEdipine XL 60 milliGRAM(s) Oral daily  ondansetron Injectable 4 milliGRAM(s) IV Push once  senna 2 Tablet(s) Oral at bedtime  sodium chloride 0.9%. 1000 milliLiter(s) (160 mL/Hr) IV Continuous <Continuous>    MEDICATIONS  (PRN):  benzocaine 15 mG/menthol 3.6 mG Lozenge 1 Lozenge Oral four times a day PRN Sore Throat  HYDROmorphone  Injectable 1 milliGRAM(s) IV Push every 4 hours PRN Severe Pain (7 - 10)  melatonin 5 milliGRAM(s) Oral at bedtime PRN Sleep  oxyCODONE    IR 5 milliGRAM(s) Oral every 4 hours PRN Moderate Pain (4 - 6)  oxyCODONE    IR 10 milliGRAM(s) Oral every 4 hours PRN Severe Pain (7 - 10)        Allergies    No Known Allergies    Intolerances        OBJECTIVE:  Vital Signs Last 24 Hrs  T(C): 36.6 (26 Dec 2022 04:51), Max: 36.8 (25 Dec 2022 20:20)  T(F): 97.9 (26 Dec 2022 04:51), Max: 98.2 (25 Dec 2022 20:20)  HR: 99 (26 Dec 2022 11:32) (82 - 99)  BP: 127/92 (26 Dec 2022 11:32) (126/84 - 134/68)  BP(mean): --  RR: 18 (26 Dec 2022 11:32) (18 - 18)  SpO2: 99% (26 Dec 2022 11:32) (92% - 99%)    Parameters below as of 26 Dec 2022 11:32  Patient On (Oxygen Delivery Method): room air                            PHYSICAL EXAM:  Gen: Resting in bed at time of exam, not in distress   HEENT: moist mucosa, no lesions   Neck: supple, trachea at midline  CV: RRR, +S1/S2  Pulm: no wheezing , no crackles  no increase in work of breathing  Abd: soft, NTND  Skin: warm and dry, no new rashes   Ext: left shoulder , arm , fore arm  ,wrist and hand swollen, warm , tender , Range of motion limited due to pain   Neuro: AOx3, no gross focal neurological deficits  Psych: affect and behavior appropriate, pleasant at time of interview    LABS:             No new labs today             Urinalysis Basic - ( 19 Dec 2022 04:15 )    Color: Yellow / Appearance: Clear / S.025 / pH: x  Gluc: x / Ketone: NEGATIVE  / Bili: Negative / Urobili: 2.0 E.U./dL   Blood: x / Protein: 30 mg/dL / Nitrite: NEGATIVE   Leuk Esterase: NEGATIVE / RBC: < 5 /HPF / WBC < 5 /HPF   Sq Epi: x / Non Sq Epi: 0-5 /HPF / Bacteria: Present /HPF          Culture - Blood (collected 18 Dec 2022 22:49)  Source: .Blood Blood  Preliminary Report (21 Dec 2022 01:00):    No growth at 2 days.    Culture - Blood (collected 18 Dec 2022 22:49)  Source: .Blood Blood  Preliminary Report (21 Dec 2022 01:00):    No growth at 2 days.          RADIOLOGY/OTHER STUDIES:

## 2022-12-26 NOTE — PROGRESS NOTE ADULT - ASSESSMENT
52 year old male admitted for septic joint     Impression and Plan   # Sepsis   # Septic Left shoulder Joint s/p Washout 12/17   - IV Daptomycin , MRSA in Culture from OR ,   - Blood cx negative x 4 days   - repeat blood cx from 12/24 negative x 36 hours   - Venous duplex negative   - F/u ID recommendations   - Febrile 12/24   - other than left shoulder , no skin , pulm, gi , gu symptoms of infection   - PICC in right upper Extremity      # SVT   -s/p adenosine , continue metoprolol xl and nifedipine     # Hyponatremia   - asymptomatic , Na around 133- 135 consistently     # DVT prophylaxis - Lovenox     Dispo : HOme when home anbx infusion is arranged

## 2022-12-27 ENCOUNTER — TRANSCRIPTION ENCOUNTER (OUTPATIENT)
Age: 52
End: 2022-12-27

## 2022-12-27 VITALS
DIASTOLIC BLOOD PRESSURE: 80 MMHG | HEART RATE: 124 BPM | RESPIRATION RATE: 20 BRPM | OXYGEN SATURATION: 98 % | TEMPERATURE: 98 F | SYSTOLIC BLOOD PRESSURE: 131 MMHG

## 2022-12-27 LAB
ANION GAP SERPL CALC-SCNC: 9 MMOL/L — SIGNIFICANT CHANGE UP (ref 5–17)
BUN SERPL-MCNC: 12 MG/DL — SIGNIFICANT CHANGE UP (ref 7–23)
CALCIUM SERPL-MCNC: 9.3 MG/DL — SIGNIFICANT CHANGE UP (ref 8.4–10.5)
CHLORIDE SERPL-SCNC: 101 MMOL/L — SIGNIFICANT CHANGE UP (ref 96–108)
CO2 SERPL-SCNC: 25 MMOL/L — SIGNIFICANT CHANGE UP (ref 22–31)
CREAT SERPL-MCNC: 0.79 MG/DL — SIGNIFICANT CHANGE UP (ref 0.5–1.3)
CRP SERPL-MCNC: 85.3 MG/L — HIGH (ref 0–4)
EGFR: 107 ML/MIN/1.73M2 — SIGNIFICANT CHANGE UP
GLUCOSE SERPL-MCNC: 88 MG/DL — SIGNIFICANT CHANGE UP (ref 70–99)
HCT VFR BLD CALC: 32.8 % — LOW (ref 39–50)
HGB BLD-MCNC: 10.3 G/DL — LOW (ref 13–17)
MCHC RBC-ENTMCNC: 26.1 PG — LOW (ref 27–34)
MCHC RBC-ENTMCNC: 31.4 GM/DL — LOW (ref 32–36)
MCV RBC AUTO: 83.2 FL — SIGNIFICANT CHANGE UP (ref 80–100)
NRBC # BLD: 0 /100 WBCS — SIGNIFICANT CHANGE UP (ref 0–0)
PLATELET # BLD AUTO: 705 K/UL — HIGH (ref 150–400)
POTASSIUM SERPL-MCNC: 4.3 MMOL/L — SIGNIFICANT CHANGE UP (ref 3.5–5.3)
POTASSIUM SERPL-SCNC: 4.3 MMOL/L — SIGNIFICANT CHANGE UP (ref 3.5–5.3)
RBC # BLD: 3.94 M/UL — LOW (ref 4.2–5.8)
RBC # FLD: 15.1 % — HIGH (ref 10.3–14.5)
SODIUM SERPL-SCNC: 135 MMOL/L — SIGNIFICANT CHANGE UP (ref 135–145)
WBC # BLD: 6.55 K/UL — SIGNIFICANT CHANGE UP (ref 3.8–10.5)
WBC # FLD AUTO: 6.55 K/UL — SIGNIFICANT CHANGE UP (ref 3.8–10.5)

## 2022-12-27 PROCEDURE — C1751: CPT

## 2022-12-27 PROCEDURE — 80048 BASIC METABOLIC PNL TOTAL CA: CPT

## 2022-12-27 PROCEDURE — 97161 PT EVAL LOW COMPLEX 20 MIN: CPT

## 2022-12-27 PROCEDURE — 87205 SMEAR GRAM STAIN: CPT

## 2022-12-27 PROCEDURE — 84484 ASSAY OF TROPONIN QUANT: CPT

## 2022-12-27 PROCEDURE — 93971 EXTREMITY STUDY: CPT

## 2022-12-27 PROCEDURE — 36415 COLL VENOUS BLD VENIPUNCTURE: CPT

## 2022-12-27 PROCEDURE — 97116 GAIT TRAINING THERAPY: CPT

## 2022-12-27 PROCEDURE — 85025 COMPLETE CBC W/AUTO DIFF WBC: CPT

## 2022-12-27 PROCEDURE — 85027 COMPLETE CBC AUTOMATED: CPT

## 2022-12-27 PROCEDURE — 86140 C-REACTIVE PROTEIN: CPT

## 2022-12-27 PROCEDURE — 85652 RBC SED RATE AUTOMATED: CPT

## 2022-12-27 PROCEDURE — 85730 THROMBOPLASTIN TIME PARTIAL: CPT

## 2022-12-27 PROCEDURE — 99291 CRITICAL CARE FIRST HOUR: CPT

## 2022-12-27 PROCEDURE — 86901 BLOOD TYPING SEROLOGIC RH(D): CPT

## 2022-12-27 PROCEDURE — 87070 CULTURE OTHR SPECIMN AEROBIC: CPT

## 2022-12-27 PROCEDURE — 87116 MYCOBACTERIA CULTURE: CPT

## 2022-12-27 PROCEDURE — 81001 URINALYSIS AUTO W/SCOPE: CPT

## 2022-12-27 PROCEDURE — 84300 ASSAY OF URINE SODIUM: CPT

## 2022-12-27 PROCEDURE — U0005: CPT

## 2022-12-27 PROCEDURE — 87206 SMEAR FLUORESCENT/ACID STAI: CPT

## 2022-12-27 PROCEDURE — 83036 HEMOGLOBIN GLYCOSYLATED A1C: CPT

## 2022-12-27 PROCEDURE — 97112 NEUROMUSCULAR REEDUCATION: CPT

## 2022-12-27 PROCEDURE — U0003: CPT

## 2022-12-27 PROCEDURE — 87181 SC STD AGAR DILUTION PER AGT: CPT

## 2022-12-27 PROCEDURE — 80202 ASSAY OF VANCOMYCIN: CPT

## 2022-12-27 PROCEDURE — 36573 INSJ PICC RS&I 5 YR+: CPT

## 2022-12-27 PROCEDURE — 99232 SBSQ HOSP IP/OBS MODERATE 35: CPT

## 2022-12-27 PROCEDURE — 82553 CREATINE MB FRACTION: CPT

## 2022-12-27 PROCEDURE — 87040 BLOOD CULTURE FOR BACTERIA: CPT

## 2022-12-27 PROCEDURE — 87637 SARSCOV2&INF A&B&RSV AMP PRB: CPT

## 2022-12-27 PROCEDURE — 82550 ASSAY OF CK (CPK): CPT

## 2022-12-27 PROCEDURE — 97535 SELF CARE MNGMENT TRAINING: CPT

## 2022-12-27 PROCEDURE — 83935 ASSAY OF URINE OSMOLALITY: CPT

## 2022-12-27 PROCEDURE — 97165 OT EVAL LOW COMPLEX 30 MIN: CPT

## 2022-12-27 PROCEDURE — 96365 THER/PROPH/DIAG IV INF INIT: CPT

## 2022-12-27 PROCEDURE — 96375 TX/PRO/DX INJ NEW DRUG ADDON: CPT

## 2022-12-27 PROCEDURE — 84443 ASSAY THYROID STIM HORMONE: CPT

## 2022-12-27 PROCEDURE — 84100 ASSAY OF PHOSPHORUS: CPT

## 2022-12-27 PROCEDURE — 71045 X-RAY EXAM CHEST 1 VIEW: CPT

## 2022-12-27 PROCEDURE — 86900 BLOOD TYPING SEROLOGIC ABO: CPT

## 2022-12-27 PROCEDURE — C8929: CPT

## 2022-12-27 PROCEDURE — 87102 FUNGUS ISOLATION CULTURE: CPT

## 2022-12-27 PROCEDURE — 80061 LIPID PANEL: CPT

## 2022-12-27 PROCEDURE — 83735 ASSAY OF MAGNESIUM: CPT

## 2022-12-27 PROCEDURE — 93005 ELECTROCARDIOGRAM TRACING: CPT

## 2022-12-27 PROCEDURE — 99233 SBSQ HOSP IP/OBS HIGH 50: CPT

## 2022-12-27 PROCEDURE — 80053 COMPREHEN METABOLIC PANEL: CPT

## 2022-12-27 PROCEDURE — 83605 ASSAY OF LACTIC ACID: CPT

## 2022-12-27 PROCEDURE — 87186 SC STD MICRODIL/AGAR DIL: CPT

## 2022-12-27 PROCEDURE — 80307 DRUG TEST PRSMV CHEM ANLYZR: CPT

## 2022-12-27 PROCEDURE — C9399: CPT

## 2022-12-27 PROCEDURE — 85610 PROTHROMBIN TIME: CPT

## 2022-12-27 PROCEDURE — 97530 THERAPEUTIC ACTIVITIES: CPT

## 2022-12-27 PROCEDURE — 87086 URINE CULTURE/COLONY COUNT: CPT

## 2022-12-27 PROCEDURE — 87075 CULTR BACTERIA EXCEPT BLOOD: CPT

## 2022-12-27 PROCEDURE — 86850 RBC ANTIBODY SCREEN: CPT

## 2022-12-27 RX ORDER — OXYCODONE HYDROCHLORIDE 5 MG/1
1 TABLET ORAL
Qty: 28 | Refills: 0
Start: 2022-12-27 | End: 2023-01-02

## 2022-12-27 RX ORDER — DAPTOMYCIN 500 MG/10ML
1000 INJECTION, POWDER, LYOPHILIZED, FOR SOLUTION INTRAVENOUS EVERY 24 HOURS
Refills: 0 | Status: DISCONTINUED | OUTPATIENT
Start: 2022-12-27 | End: 2022-12-27

## 2022-12-27 RX ORDER — METOPROLOL TARTRATE 50 MG
5 TABLET ORAL ONCE
Refills: 0 | Status: COMPLETED | OUTPATIENT
Start: 2022-12-27 | End: 2022-12-27

## 2022-12-27 RX ORDER — ADENOSINE 3 MG/ML
6 INJECTION INTRAVENOUS ONCE
Refills: 0 | Status: DISCONTINUED | OUTPATIENT
Start: 2022-12-27 | End: 2022-12-27

## 2022-12-27 RX ORDER — METOPROLOL TARTRATE 50 MG
50 TABLET ORAL
Refills: 0 | Status: DISCONTINUED | OUTPATIENT
Start: 2022-12-27 | End: 2022-12-27

## 2022-12-27 RX ORDER — SENNA PLUS 8.6 MG/1
2 TABLET ORAL
Qty: 0 | Refills: 0 | DISCHARGE
Start: 2022-12-27

## 2022-12-27 RX ORDER — DAPTOMYCIN 500 MG/10ML
1000 INJECTION, POWDER, LYOPHILIZED, FOR SOLUTION INTRAVENOUS
Qty: 0 | Refills: 0 | DISCHARGE
Start: 2022-12-27

## 2022-12-27 RX ORDER — METOPROLOL TARTRATE 50 MG
5 TABLET ORAL ONCE
Refills: 0 | Status: DISCONTINUED | OUTPATIENT
Start: 2022-12-27 | End: 2022-12-27

## 2022-12-27 RX ORDER — NIFEDIPINE 30 MG
1 TABLET, EXTENDED RELEASE 24 HR ORAL
Qty: 30 | Refills: 0
Start: 2022-12-27 | End: 2023-01-25

## 2022-12-27 RX ORDER — ACETAMINOPHEN 500 MG
3 TABLET ORAL
Qty: 0 | Refills: 0 | DISCHARGE
Start: 2022-12-27

## 2022-12-27 RX ORDER — METOPROLOL TARTRATE 50 MG
1 TABLET ORAL
Qty: 60 | Refills: 0
Start: 2022-12-27 | End: 2023-01-25

## 2022-12-27 RX ADMIN — Medication 975 MILLIGRAM(S): at 05:06

## 2022-12-27 RX ADMIN — Medication 600 MILLIGRAM(S): at 12:18

## 2022-12-27 RX ADMIN — OXYCODONE HYDROCHLORIDE 10 MILLIGRAM(S): 5 TABLET ORAL at 08:06

## 2022-12-27 RX ADMIN — CHLORHEXIDINE GLUCONATE 1 APPLICATION(S): 213 SOLUTION TOPICAL at 09:10

## 2022-12-27 RX ADMIN — Medication 5 MILLIGRAM(S): at 10:30

## 2022-12-27 RX ADMIN — OXYCODONE HYDROCHLORIDE 10 MILLIGRAM(S): 5 TABLET ORAL at 09:10

## 2022-12-27 RX ADMIN — Medication 600 MILLIGRAM(S): at 05:06

## 2022-12-27 RX ADMIN — Medication 975 MILLIGRAM(S): at 14:18

## 2022-12-27 RX ADMIN — DAPTOMYCIN 140 MILLIGRAM(S): 500 INJECTION, POWDER, LYOPHILIZED, FOR SOLUTION INTRAVENOUS at 17:00

## 2022-12-27 RX ADMIN — Medication 60 MILLIGRAM(S): at 05:06

## 2022-12-27 RX ADMIN — Medication 600 MILLIGRAM(S): at 00:12

## 2022-12-27 RX ADMIN — ENOXAPARIN SODIUM 40 MILLIGRAM(S): 100 INJECTION SUBCUTANEOUS at 05:06

## 2022-12-27 NOTE — PROGRESS NOTE ADULT - SUBJECTIVE AND OBJECTIVE BOX
INTERVAL EVENTS:    PAST MEDICAL & SURGICAL HISTORY:  No pertinent past medical history    Obesity    No significant past surgical history        MEDICATIONS  (STANDING):  acetaminophen     Tablet .. 975 milliGRAM(s) Oral every 8 hours  chlorhexidine 2% Cloths 1 Application(s) Topical <User Schedule>  DAPTOmycin IVPB 1000 milliGRAM(s) IV Intermittent every 24 hours  enoxaparin Injectable 40 milliGRAM(s) SubCutaneous every 12 hours  ibuprofen  Tablet. 600 milliGRAM(s) Oral every 6 hours  influenza   Vaccine 0.5 milliLiter(s) IntraMuscular once  lactulose Syrup 10 Gram(s) Oral at bedtime  magnesium hydroxide Suspension 30 milliLiter(s) Oral at bedtime  metoprolol tartrate 50 milliGRAM(s) Oral two times a day  NIFEdipine XL 60 milliGRAM(s) Oral daily  ondansetron Injectable 4 milliGRAM(s) IV Push once  senna 2 Tablet(s) Oral at bedtime    MEDICATIONS  (PRN):  benzocaine 15 mG/menthol 3.6 mG Lozenge 1 Lozenge Oral four times a day PRN Sore Throat  melatonin 5 milliGRAM(s) Oral at bedtime PRN Sleep  oxyCODONE    IR 10 milliGRAM(s) Oral every 4 hours PRN Severe Pain (7 - 10)  oxyCODONE    IR 5 milliGRAM(s) Oral every 4 hours PRN Moderate Pain (4 - 6)    Vital Signs Last 24 Hrs  T(C): 36.7 (27 Dec 2022 08:04), Max: 36.7 (26 Dec 2022 20:30)  T(F): 98 (27 Dec 2022 08:04), Max: 98.1 (26 Dec 2022 20:30)  HR: 83 (27 Dec 2022 09:26) (80 - 97)  BP: 127/78 (27 Dec 2022 09:26) (127/78 - 161/92)  BP(mean): --  RR: 18 (27 Dec 2022 08:04) (18 - 18)  SpO2: 98% (27 Dec 2022 08:04) (96% - 99%)    Parameters below as of 27 Dec 2022 08:04  Patient On (Oxygen Delivery Method): room air        PHYSICAL EXAM:  GEN: Awake, alert. NAD.   HEENT: NCAT, PERRL, EOMI. Mucosa moist. No JVD.  RESP: CTA b/l  CV: RRR. Normal S1/S2. No m/r/g.  ABD: Soft. NT/ND. BS+  EXT: Warm. No edema, clubbing, or cyanosis.   NEURO: AAOx3. No focal deficits.     LABS:                        10.3   6.55  )-----------( 705      ( 27 Dec 2022 05:30 )             32.8     12-27    135  |  101  |  12  ----------------------------<  88  4.3   |  25  |  0.79    Ca    9.3      27 Dec 2022 05:30              I&O's Summary    26 Dec 2022 07:01  -  27 Dec 2022 07:00  --------------------------------------------------------  IN: 0 mL / OUT: 1800 mL / NET: -1800 mL    27 Dec 2022 07:01  -  27 Dec 2022 12:19  --------------------------------------------------------  IN: 360 mL / OUT: 400 mL / NET: -40 mL      RADIOLOGY & ADDITIONAL STUDIES:  TELEMETRY:  EKG:

## 2022-12-27 NOTE — PROGRESS NOTE ADULT - ASSESSMENT
53 y/o male with obesity, no known significant PMHx presented to [facility] ER 12/15 with atraumatic left shoulder pain and swelling x approximately 1 week, found to have septic L shoulder joint but in the ER was noted to be in SVT to 200s, admitted to cardiac tele for management of SVT and then transferred to Orthopedic service for washout on 12/17. Cardiology reconsulted for SVT this morning.     #SVT  Patient with no past history of SVT. 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 Was seen by EP and planned for possible outpatient ablation   - Tele: notable for SVT upto 170-180's initially. With vagal maneuver's --> noted to be in sinus tachycardia 140's-150's  - ECG: sinus tachycardia to 140's with first degree AVB  - TTE: Normal LV fx, mild LVH, trivial effusion  - Would check rectal temperature and rule out other causes such as PE   - Recommend lopressor 50mg BID     #HTN   - Continue nifedipine to 60mg qd     Cardiology to follow  51 y/o male with obesity, no known significant PMHx presented to [facility] ER 12/15 with atraumatic left shoulder pain and swelling x approximately 1 week, found to have septic L shoulder joint but in the ER was noted to be in SVT to 200s, admitted to cardiac tele for management of SVT and then transferred to Orthopedic service for washout on 12/17. Cardiology reconsulted for SVT this morning.     #SVT  Patient with no past history of SVT. 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 Was seen by EP and planned for possible outpatient ablation   - Tele: notable for SVT upto 170-180's initially. With vagal maneuver's --> noted to be in sinus tachycardia 140's-150's  - ECG: sinus tachycardia to 140's with first degree AVB  - TTE: Normal LV fx, mild LVH, trivial effusion  - Would check rectal temperature and rule out other causes such as PE   - Recommend lopressor 50mg BID     #HTN   - Continue nifedipine  60mg qd     Cardiology to follow

## 2022-12-27 NOTE — PROGRESS NOTE ADULT - PROVIDER SPECIALTY LIST ADULT
Infectious Disease
Internal Medicine
Orthopedics
Cardiology
Cardiology
Infectious Disease
Internal Medicine
Internal Medicine
Orthopedics
Cardiology
Infectious Disease
Internal Medicine
Internal Medicine
Orthopedics
Infectious Disease
Infectious Disease
Internal Medicine
Infectious Disease

## 2022-12-27 NOTE — PROGRESS NOTE ADULT - SUBJECTIVE AND OBJECTIVE BOX
Ortho Note    Subjective:  Pt comfortable without complaints, pain controlled with current pain medication regimen.,   Denies CP, SOB, N/V, numbness/tingling   Reviewed plan of care with patient at bedside      Vital Signs Last 24 Hrs  T(C): 36.4 (12-27-22 @ 10:31), Max: 36.7 (12-27-22 @ 08:04)  T(F): 97.6 (12-27-22 @ 10:31), Max: 98 (12-27-22 @ 08:04)  HR: 124 (12-27-22 @ 10:31) (83 - 124)  BP: 131/80 (12-27-22 @ 10:31) (127/78 - 161/92)  BP(mean): --  RR: 20 (12-27-22 @ 10:31) (18 - 20)  SpO2: 98% (12-27-22 @ 10:31) (98% - 98%)  AVSS    Objective:    Physical Exam:  General: Pt Alert and oriented, NAD  Left shoulder dressing CDI  Pulses: +2 radial pulses, wwp, cap refill   Sensation: silt in ain/pin/ulnar   Motor: EHL/FHL/TA/GS        Plan of Care:  A/P: 52yMale POD# s/p Left shoulder arthroscopic washout   - afebrile   - Pain Control- Oxycodone 5-10mg PO Q4h prn moderate to severe pain, tylenol 975mg PO Q8h, ibuprofen 600mg PO Q6h,   - DVT ppx: scds, Lovenox 40mg Subq Daily- will dc upon discharge  - PT, WBS: - WBAT  - appreciate ID recs-   - cultures - MRSA in cultures, Daptomycin 1000mg IV Q24,   - appreciated medicine recs  - bowel regimen, IS use, PPI  - Dispo- home pending setup of services,     Ortho Pager [phone]

## 2022-12-27 NOTE — PROGRESS NOTE ADULT - ASSESSMENT
52 year old male admitted for septic joint     Impression and Plan   # Sepsis   # Septic Left shoulder Joint s/p Washout 12/17   - IV Daptomycin , MRSA in Culture from OR ,   - Blood cx negative x 4 days   - repeat blood cx from 12/24 negative x 36 hours   - Venous duplex negative   - F/u ID recommendations   - Febrile 12/24   - other than left shoulder , no skin , pulm, gi , gu symptoms of infection   - PICC in right upper Extremity      # SVT   -s/p adenosine , continue metoprolol xl and nifedipine   - 2nd episode on 12/27 , cardiology at bedside , HR improved with IV Metoprolol Tartrate 5mg once , recommendations were to change toprol 50 to lopressor 50mg po bid     # Hyponatremia   - asymptomatic , Na around 133- 135 consistently     # DVT prophylaxis - Lovenox     Dispo : HOme when home anbx infusion is arranged

## 2022-12-27 NOTE — PROVIDER CONTACT NOTE (OTHER) - REASON
First degree heart block on the monitor.
Fever, tachycardia
Pt ST as high as 140
Elevated temp. and Tachycardia
Pt temperature
pt HR 160s

## 2022-12-27 NOTE — PROVIDER CONTACT NOTE (OTHER) - ACTION/TREATMENT ORDERED:
Tylenol 975mg PO given prn,Awaiting for MD to see pt.
MD Richard Lawrence notified. No actions at this time. Will continue to monitor.
Continue to monitor, no interventions at this time. Safety measures maintained.
MD aware. Standing tylenol being given. Will continue to monitor.
MD aware. Tylenol ordered. Will continue to monitor pt.
STAT EKG, 5mg metoprolol IVP, rectal temp. Continue to monitor. Safety measures maintained.

## 2022-12-27 NOTE — PROGRESS NOTE ADULT - REASON FOR ADMISSION
septic L shoulder

## 2022-12-27 NOTE — PROGRESS NOTE ADULT - ATTENDING COMMENTS
Initial attending contact date  12/19/22    . See fellow note written above for details. I reviewed the fellow documentation. I have personally seen and examined this patient. I reviewed vitals, labs, medications, cardiac studies, and additional imaging. I agree with the above fellow's findings and plans as written above with the following additions/statements.    51 y/o male with obesity, no known significant PMHx presented to [facility] ER 12/15 with atraumatic left shoulder pain and swelling x approximately 1 week, found to have septic L shoulder joint but in the ER was noted to be in SVT to 200s, admitted to cardiac tele for further management of SVT. Patient was then moved to Orthopedic service for washout on 12/17. Cardiology following for SVT.     -SVT without past history of SVT. 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 Was seen by EP and planned for possible outpatient ablation   - ECG: Repeat ECG with normal sinus rhythm  - Tele this AM: Sinus tachycardia in 100s, clinically appears improved.  - TTE: Normal LV fx, mild LVH, trivial effusion  - Sinus tachycardia in the setting of fever. Would control underlying etiology.   - c/w Toprol 50mg qd for now   -Increase nifedipine 60 mg poqd for better BP control   -Pls reconsult as needed
Initial attending contact date  12/19/22    . See fellow note written above for details. I reviewed the fellow documentation. I have personally seen and examined this patient. I reviewed vitals, labs, medications, cardiac studies, and additional imaging. I agree with the above fellow's findings and plans as written above with the following additions/statements.    51 y/o male with obesity, no known significant PMHx presented to [facility] ER 12/15 with atraumatic left shoulder pain and swelling x approximately 1 week, found to have septic L shoulder joint but in the ER was noted to be in SVT to 200s, admitted to cardiac tele for further management of SVT. Patient was then moved to Orthopedic service for washout on 12/17. Cardiology following for SVT.     -SVT without past history of SVT. 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 Was seen by EP and planned for possible outpatient ablation   - ECG: Repeat ECG with normal sinus rhythm  - Tele this AM: tachy in 160s, with lowering of HR noted to be in sinus tach with severely prolonged [place]   - TTE: Normal LV fx, mild LVH, trivial effusion  -Increase metop 50 mg bid   -Cont nifedipine 60 mg poqd for better BP control

## 2022-12-27 NOTE — DISCHARGE NOTE NURSING/CASE MANAGEMENT/SOCIAL WORK - PATIENT PORTAL LINK FT
You can access the FollowMyHealth Patient Portal offered by  by registering at the following website: [email or web address]. By joining [employer]’s FollowMyHealth portal, you will also be able to view your health information using other applications (apps) compatible with our system.

## 2022-12-27 NOTE — PROGRESS NOTE ADULT - ASSESSMENT
52M h/o obesity p/w L shoulder pain and swelling x 1 week, found to have L shoulder septic arthritis.  Now s/p washout on 12/17.  OR culture growing MRSA. Fevers resolved. L shoulder exam and CRP improved. s/p PICC.  - continue daptomycin 1g IV q24h for 4 week course until 1/14/23  - weekly CBC, CMP, ESR, CRP, CK to be faxed to Dr. Rojas [phone]    Will arrange post hospital f/u with Dr. Rojas.    Please reconsult with ?

## 2022-12-27 NOTE — PROGRESS NOTE ADULT - SUBJECTIVE AND OBJECTIVE BOX
INTERVAL HPI/OVERNIGHT EVENTS: No fevers. Improved L shoulder pain and ROM.    CONSTITUTIONAL:  Negative fever or chills, feels well, good appetite  EYES:  Negative  blurry vision or double vision  CARDIOVASCULAR:  Negative for chest pain or palpitations  RESPIRATORY:  Negative for cough, wheezing, or SOB   GASTROINTESTINAL:  Negative for nausea, vomiting, diarrhea, constipation, or abdominal pain  GENITOURINARY:  Negative frequency, urgency or dysuria  NEUROLOGIC:  No headache, confusion, dizziness, lightheadedness      ANTIBIOTICS/RELEVANT:    MEDICATIONS  (STANDING):  acetaminophen     Tablet .. 975 milliGRAM(s) Oral every 8 hours  chlorhexidine 2% Cloths 1 Application(s) Topical <User Schedule>  DAPTOmycin IVPB 1000 milliGRAM(s) IV Intermittent every 24 hours  enoxaparin Injectable 40 milliGRAM(s) SubCutaneous every 12 hours  ibuprofen  Tablet. 600 milliGRAM(s) Oral every 6 hours  influenza   Vaccine 0.5 milliLiter(s) IntraMuscular once  lactulose Syrup 10 Gram(s) Oral at bedtime  magnesium hydroxide Suspension 30 milliLiter(s) Oral at bedtime  metoprolol tartrate 50 milliGRAM(s) Oral two times a day  NIFEdipine XL 60 milliGRAM(s) Oral daily  ondansetron Injectable 4 milliGRAM(s) IV Push once  senna 2 Tablet(s) Oral at bedtime    MEDICATIONS  (PRN):  benzocaine 15 mG/menthol 3.6 mG Lozenge 1 Lozenge Oral four times a day PRN Sore Throat  melatonin 5 milliGRAM(s) Oral at bedtime PRN Sleep  oxyCODONE    IR 10 milliGRAM(s) Oral every 4 hours PRN Severe Pain (7 - 10)  oxyCODONE    IR 5 milliGRAM(s) Oral every 4 hours PRN Moderate Pain (4 - 6)        Vital Signs Last 24 Hrs  T(C): 36.4 (27 Dec 2022 10:31), Max: 36.7 (26 Dec 2022 20:30)  T(F): 97.6 (27 Dec 2022 10:31), Max: 98.1 (26 Dec 2022 20:30)  HR: 124 (27 Dec 2022 10:31) (80 - 124)  BP: 131/80 (27 Dec 2022 10:31) (127/78 - 161/92)  BP(mean): --  RR: 20 (27 Dec 2022 10:31) (18 - 20)  SpO2: 98% (27 Dec 2022 10:31) (96% - 99%)    Parameters below as of 27 Dec 2022 10:31  Patient On (Oxygen Delivery Method): room air        PHYSICAL EXAM:  Constitutional: NAD  Eyes: FLORY, EOMI  Ear/Nose/Throat: no oral lesion, no sinus tenderness on percussion	  Neck: no JVD, no lymphadenopathy, supple  Respiratory: CTA rocio  Cardiovascular: S1S2 RRR, no murmurs  Gastrointestinal:soft, (+) BS, no HSM  Extremities: now able to abduct L shoulder  Vascular: DP Pulse:	right normal; left normal      LABS:                        10.3   6.55  )-----------( 705      ( 27 Dec 2022 05:30 )             32.8     12-27    135  |  101  |  12  ----------------------------<  88  4.3   |  25  |  0.79    Ca    9.3      27 Dec 2022 05:30            MICROBIOLOGY: reviewed    RADIOLOGY & ADDITIONAL STUDIES: reviewed

## 2022-12-27 NOTE — PROGRESS NOTE ADULT - SUBJECTIVE AND OBJECTIVE BOX
Patient is a 52y old  Male who presents with a chief complaint of septic L shoulder (20 Dec 2022 12:14)        SUBJECTIVE:  Patient was seen and examined at bedside.    Overnight Events :  last febrile episode 5 am on  ,  tachycardic to 160s-170s , asymptomatic       Review of systems: 12 point Review of systems negative unless otherwise documented elsewhere in note.     Diet, DASH/TLC:   Sodium & Cholesterol Restricted (22 @ 17:32) [Active]      MEDICATIONS:  MEDICATIONS  (STANDING):  acetaminophen     Tablet .. 975 milliGRAM(s) Oral every 8 hours  chlorhexidine 2% Cloths 1 Application(s) Topical <User Schedule>  DAPTOmycin IVPB 1000 milliGRAM(s) IV Intermittent every 24 hours  enoxaparin Injectable 40 milliGRAM(s) SubCutaneous every 12 hours  ibuprofen  Tablet. 600 milliGRAM(s) Oral every 6 hours  influenza   Vaccine 0.5 milliLiter(s) IntraMuscular once  lactulose Syrup 10 Gram(s) Oral at bedtime  magnesium hydroxide Suspension 30 milliLiter(s) Oral at bedtime  metoprolol succinate ER 50 milliGRAM(s) Oral every 24 hours  NIFEdipine XL 60 milliGRAM(s) Oral daily  ondansetron Injectable 4 milliGRAM(s) IV Push once  senna 2 Tablet(s) Oral at bedtime  sodium chloride 0.9%. 1000 milliLiter(s) (160 mL/Hr) IV Continuous <Continuous>    MEDICATIONS  (PRN):  benzocaine 15 mG/menthol 3.6 mG Lozenge 1 Lozenge Oral four times a day PRN Sore Throat  HYDROmorphone  Injectable 1 milliGRAM(s) IV Push every 4 hours PRN Severe Pain (7 - 10)  melatonin 5 milliGRAM(s) Oral at bedtime PRN Sleep  oxyCODONE    IR 5 milliGRAM(s) Oral every 4 hours PRN Moderate Pain (4 - 6)  oxyCODONE    IR 10 milliGRAM(s) Oral every 4 hours PRN Severe Pain (7 - 10)        Allergies    No Known Allergies    Intolerances        OBJECTIVE:  Vital Signs Last 24 Hrs  T(C): 36.4 (27 Dec 2022 10:31), Max: 36.7 (26 Dec 2022 20:30)  T(F): 97.6 (27 Dec 2022 10:31), Max: 98.1 (26 Dec 2022 20:30)  HR: 124 (27 Dec 2022 10:31) (80 - 124)  BP: 131/80 (27 Dec 2022 10:31) (127/78 - 161/92)  BP(mean): --  RR: 20 (27 Dec 2022 10:31) (18 - 20)  SpO2: 98% (27 Dec 2022 10:31) (96% - 99%)    Parameters below as of 27 Dec 2022 10:31  Patient On (Oxygen Delivery Method): room air                              PHYSICAL EXAM:  Gen: Resting in bed at time of exam, not in distress   HEENT: moist mucosa, no lesions   Neck: supple, trachea at midline  CV: RRR, +S1/S2  Pulm: no wheezing , no crackles  no increase in work of breathing  Abd: soft, NTND  Skin: warm and dry, no new rashes   Ext: left shoulder , arm , fore arm  ,wrist and hand swollen, warm , tender , Range of motion limited due to pain   Neuro: AOx3, no gross focal neurological deficits  Psych: affect and behavior appropriate, pleasant at time of interview    LABS:             No new labs today             Urinalysis Basic - ( 19 Dec 2022 04:15 )    Color: Yellow / Appearance: Clear / S.025 / pH: x  Gluc: x / Ketone: NEGATIVE  / Bili: Negative / Urobili: 2.0 E.U./dL   Blood: x / Protein: 30 mg/dL / Nitrite: NEGATIVE   Leuk Esterase: NEGATIVE / RBC: < 5 /HPF / WBC < 5 /HPF   Sq Epi: x / Non Sq Epi: 0-5 /HPF / Bacteria: Present /HPF          Culture - Blood (collected 18 Dec 2022 22:49)  Source: .Blood Blood  Preliminary Report (21 Dec 2022 01:00):    No growth at 2 days.    Culture - Blood (collected 18 Dec 2022 22:49)  Source: .Blood Blood  Preliminary Report (21 Dec 2022 01:00):    No growth at 2 days.          RADIOLOGY/OTHER STUDIES:

## 2022-12-27 NOTE — PROGRESS NOTE ADULT - SUBJECTIVE AND OBJECTIVE BOX
INTERVAL EVENTS: Reconsulted SVT to 170-180's. Pt eventually slowed down to sinus tachycardia w/ vagal maneuvers. Denies chest pain/ palpitations or SOB.     PAST MEDICAL & SURGICAL HISTORY:  No pertinent past medical history    Obesity    No significant past surgical history        MEDICATIONS  (STANDING):  acetaminophen     Tablet .. 975 milliGRAM(s) Oral every 8 hours  chlorhexidine 2% Cloths 1 Application(s) Topical <User Schedule>  DAPTOmycin IVPB 1000 milliGRAM(s) IV Intermittent every 24 hours  enoxaparin Injectable 40 milliGRAM(s) SubCutaneous every 12 hours  ibuprofen  Tablet. 600 milliGRAM(s) Oral every 6 hours  influenza   Vaccine 0.5 milliLiter(s) IntraMuscular once  lactulose Syrup 10 Gram(s) Oral at bedtime  magnesium hydroxide Suspension 30 milliLiter(s) Oral at bedtime  metoprolol tartrate 50 milliGRAM(s) Oral two times a day  NIFEdipine XL 60 milliGRAM(s) Oral daily  ondansetron Injectable 4 milliGRAM(s) IV Push once  senna 2 Tablet(s) Oral at bedtime    MEDICATIONS  (PRN):  benzocaine 15 mG/menthol 3.6 mG Lozenge 1 Lozenge Oral four times a day PRN Sore Throat  melatonin 5 milliGRAM(s) Oral at bedtime PRN Sleep  oxyCODONE    IR 10 milliGRAM(s) Oral every 4 hours PRN Severe Pain (7 - 10)  oxyCODONE    IR 5 milliGRAM(s) Oral every 4 hours PRN Moderate Pain (4 - 6)    Vital Signs Last 24 Hrs  T(C): 36.4 (27 Dec 2022 10:31), Max: 36.7 (26 Dec 2022 20:30)  T(F): 97.6 (27 Dec 2022 10:31), Max: 98.1 (26 Dec 2022 20:30)  HR: 124 (27 Dec 2022 10:31) (80 - 124)  BP: 131/80 (27 Dec 2022 10:31) (127/78 - 161/92)  BP(mean): --  RR: 20 (27 Dec 2022 10:31) (18 - 20)  SpO2: 98% (27 Dec 2022 10:31) (96% - 99%)    Parameters below as of 27 Dec 2022 10:31  Patient On (Oxygen Delivery Method): room air        PHYSICAL EXAM:  GEN: Awake, comfortable. NAD.   HEENT: NCAT, PERRL, EOMI. Mucosa moist. No JVD.   RESP: CTA b/l  CV: tachycardic, normal s1/s2. No m/r/g.  ABD: Soft, NTND. BS+  EXT: Warm. No edema, clubbing, or cyanosis.   NEURO: AAOx3. No focal deficits.    LABS:                        10.3   6.55  )-----------( 705      ( 27 Dec 2022 05:30 )             32.8     12-27    135  |  101  |  12  ----------------------------<  88  4.3   |  25  |  0.79    Ca    9.3      27 Dec 2022 05:30              I&O's Summary    26 Dec 2022 07:01  -  27 Dec 2022 07:00  --------------------------------------------------------  IN: 0 mL / OUT: 1800 mL / NET: -1800 mL    27 Dec 2022 07:01  -  27 Dec 2022 12:43  --------------------------------------------------------  IN: 360 mL / OUT: 400 mL / NET: -40 mL      < from: TTE Echo Complete w/ Contrast w/ Doppler (12.16.22 @ 15:22) >  CONCLUSIONS:     1. Technically difficult study. Very limited exam-tech notes patient is   sitting up during imaging.   2. Mild symmetric left ventricular hypertrophy.   3. Normal LV systolic function.   4. The right ventricle is not well visualized.   5. Limited valvular evaluation.   6. Trivial pericardial effusion.

## 2022-12-27 NOTE — PROVIDER CONTACT NOTE (OTHER) - BACKGROUND
S/P left shoulder washout
Pt is s/p left shoulder washout.
s/p Left shoulder washout
s/p left shoulder washout
Pt is s/p Left shoulder washout.

## 2022-12-27 NOTE — PROVIDER CONTACT NOTE (OTHER) - SITUATION
Latest temp 101.6,HR 113b/m sustained
Pt has temperature 100.5
Pt was upset that he is being discharged tomorrow rather than today. Pt HR went as high as 160s.
S/P Left Shoulder
Pt HR went as high as 140
Pt has fever 102.3, tachycardia sustaining in the 120s.

## 2022-12-27 NOTE — PROGRESS NOTE ADULT - TIME BILLING
Management of septic arthritis
as noted above
as noted above

## 2022-12-27 NOTE — PROVIDER CONTACT NOTE (OTHER) - DATE AND TIME:
18-Dec-2022 22:01
25-Dec-2022 20:49
18-Dec-2022 02:25
20-Dec-2022 19:06
24-Dec-2022 05:20
27-Dec-2022 10:15

## 2022-12-27 NOTE — PROGRESS NOTE ADULT - SUBJECTIVE AND OBJECTIVE BOX
Ortho Progress Note    Procedure: L shoulder arthroscopic washout  Surgeon: Sulma    Pt comfortable without complaints, pain controlled. Afebrile.  Denies CP, SOB, N/V, numbness/tingling     Vital Signs Last 24 Hrs  T(C): 36.7 (27 Dec 2022 04:21), Max: 36.7 (26 Dec 2022 20:30)  T(F): 98 (27 Dec 2022 04:21), Max: 98.1 (26 Dec 2022 20:30)  HR: 83 (27 Dec 2022 04:21) (80 - 99)  BP: 157/93 (27 Dec 2022 04:21) (127/92 - 157/93)  BP(mean): --  RR: 18 (27 Dec 2022 04:21) (18 - 18)  SpO2: 99% (27 Dec 2022 04:21) (94% - 99%)    Parameters below as of 27 Dec 2022 04:21  Patient On (Oxygen Delivery Method): room air        Physical Exam:  General: Pt Alert and oriented, NAD  L shoulder DSG C/D/I  Pulses: 2+ radial pulses, wwp, cap refill <3 sec  Sensation: SILT in axillary/radial/median/ulnar distributions  Motor: axillary/AIN/PIN/ulnar firing.  PROM 90 FF, 80 Abd  AROM 40 FF, 40 abd 2/2 pain        A/P: 52y Male s/p L shoulder arthroscopic washout  - Stable  - Pain Control  - f/u AM labs  - DVT ppx: SCDs  - Post op abx: dapto per ID recs  - PT, WBS: WBAT  - Trend Daily CRP  - Dispo: pending Home infusion services and ID clearance

## 2022-12-27 NOTE — PROVIDER CONTACT NOTE (OTHER) - NAME OF MD/NP/PA/DO NOTIFIED:
Richard Lawrence)
MD Richard Lawrence
Prosper Nelson MD
Rj Dunbar MD
Konrad Rodriguez
Prosper Anna MD

## 2022-12-27 NOTE — PROVIDER CONTACT NOTE (OTHER) - ASSESSMENT
First degree heart block on the monitor. Pt denies chest pain, SOB and headache. Pt watching Tv and reports feeling fine.
Pt denied any chest pain or palpitations. HR sustained in 130s for a few minutes, but pt HR went back down to 111.
Pt denies chest pain and palpitations. /80, HR 160s-170, temp 97.6 oral, 99.3 rectal
Pt states that he is chilly. Denies chest pain, palpitations. See flowsheet for recent vitals.
See flowsheet for vitals. Pt complains of being chilly.
A/responsive.denies chest pain/palpitation

## 2022-12-30 PROBLEM — E66.9 OBESITY, UNSPECIFIED: Chronic | Status: ACTIVE | Noted: 2022-12-16

## 2022-12-30 LAB
CULTURE RESULTS: SIGNIFICANT CHANGE UP
CULTURE RESULTS: SIGNIFICANT CHANGE UP
SPECIMEN SOURCE: SIGNIFICANT CHANGE UP
SPECIMEN SOURCE: SIGNIFICANT CHANGE UP

## 2022-12-31 ENCOUNTER — INPATIENT (INPATIENT)
Facility: HOSPITAL | Age: 52
LOS: 4 days | Discharge: HOME | DRG: 557 | End: 2023-01-05
Attending: STUDENT IN AN ORGANIZED HEALTH CARE EDUCATION/TRAINING PROGRAM | Admitting: GENERAL ACUTE CARE HOSPITAL
Payer: MEDICAID

## 2022-12-31 VITALS
SYSTOLIC BLOOD PRESSURE: 137 MMHG | WEIGHT: 315 LBS | DIASTOLIC BLOOD PRESSURE: 85 MMHG | HEART RATE: 110 BPM | RESPIRATION RATE: 18 BRPM | OXYGEN SATURATION: 100 % | HEIGHT: 77 IN | TEMPERATURE: 98 F

## 2022-12-31 DIAGNOSIS — I10 ESSENTIAL (PRIMARY) HYPERTENSION: ICD-10-CM

## 2022-12-31 DIAGNOSIS — M00.9 PYOGENIC ARTHRITIS, UNSPECIFIED: ICD-10-CM

## 2022-12-31 DIAGNOSIS — M62.82 RHABDOMYOLYSIS: ICD-10-CM

## 2022-12-31 DIAGNOSIS — Z29.9 ENCOUNTER FOR PROPHYLACTIC MEASURES, UNSPECIFIED: ICD-10-CM

## 2022-12-31 DIAGNOSIS — I47.1 SUPRAVENTRICULAR TACHYCARDIA: ICD-10-CM

## 2022-12-31 DIAGNOSIS — N17.0 ACUTE KIDNEY FAILURE WITH TUBULAR NECROSIS: ICD-10-CM

## 2022-12-31 DIAGNOSIS — D64.9 ANEMIA, UNSPECIFIED: ICD-10-CM

## 2022-12-31 DIAGNOSIS — D75.839 THROMBOCYTOSIS, UNSPECIFIED: ICD-10-CM

## 2022-12-31 DIAGNOSIS — R74.01 ELEVATION OF LEVELS OF LIVER TRANSAMINASE LEVELS: ICD-10-CM

## 2022-12-31 DIAGNOSIS — M00.9 PYOGENIC ARTHRITIS, UNSPECIFIED: Chronic | ICD-10-CM

## 2022-12-31 LAB
ALBUMIN SERPL ELPH-MCNC: 3.1 G/DL — LOW (ref 3.3–5)
ALP SERPL-CCNC: 166 U/L — HIGH (ref 40–120)
ALT FLD-CCNC: 172 U/L — HIGH (ref 10–45)
ANION GAP SERPL CALC-SCNC: 12 MMOL/L — SIGNIFICANT CHANGE UP (ref 5–17)
APPEARANCE UR: CLEAR — SIGNIFICANT CHANGE UP
APTT BLD: 30.1 SEC — SIGNIFICANT CHANGE UP (ref 27.5–35.5)
AST SERPL-CCNC: 379 U/L — HIGH (ref 10–40)
BASOPHILS # BLD AUTO: 0.04 K/UL — SIGNIFICANT CHANGE UP (ref 0–0.2)
BASOPHILS NFR BLD AUTO: 0.5 % — SIGNIFICANT CHANGE UP (ref 0–2)
BILIRUB SERPL-MCNC: 0.4 MG/DL — SIGNIFICANT CHANGE UP (ref 0.2–1.2)
BILIRUB UR-MCNC: NEGATIVE — SIGNIFICANT CHANGE UP
BUN SERPL-MCNC: 13 MG/DL — SIGNIFICANT CHANGE UP (ref 7–23)
CALCIUM SERPL-MCNC: 9 MG/DL — SIGNIFICANT CHANGE UP (ref 8.4–10.5)
CHLORIDE SERPL-SCNC: 102 MMOL/L — SIGNIFICANT CHANGE UP (ref 96–108)
CK SERPL-CCNC: SIGNIFICANT CHANGE UP U/L (ref 30–200)
CO2 SERPL-SCNC: 23 MMOL/L — SIGNIFICANT CHANGE UP (ref 22–31)
COLOR SPEC: YELLOW — SIGNIFICANT CHANGE UP
CREAT SERPL-MCNC: 0.86 MG/DL — SIGNIFICANT CHANGE UP (ref 0.5–1.3)
CRP SERPL-MCNC: 56.5 MG/L — HIGH (ref 0–4)
DIFF PNL FLD: ABNORMAL
EGFR: 104 ML/MIN/1.73M2 — SIGNIFICANT CHANGE UP
EOSINOPHIL # BLD AUTO: 0.25 K/UL — SIGNIFICANT CHANGE UP (ref 0–0.5)
EOSINOPHIL NFR BLD AUTO: 3 % — SIGNIFICANT CHANGE UP (ref 0–6)
ERYTHROCYTE [SEDIMENTATION RATE] IN BLOOD: 110 MM/HR — HIGH
GLUCOSE SERPL-MCNC: 109 MG/DL — HIGH (ref 70–99)
GLUCOSE UR QL: NEGATIVE — SIGNIFICANT CHANGE UP
HCT VFR BLD CALC: 33.1 % — LOW (ref 39–50)
HGB BLD-MCNC: 10.7 G/DL — LOW (ref 13–17)
IMM GRANULOCYTES NFR BLD AUTO: 0.4 % — SIGNIFICANT CHANGE UP (ref 0–0.9)
INR BLD: 1.22 — HIGH (ref 0.88–1.16)
KETONES UR-MCNC: NEGATIVE — SIGNIFICANT CHANGE UP
LEUKOCYTE ESTERASE UR-ACNC: NEGATIVE — SIGNIFICANT CHANGE UP
LYMPHOCYTES # BLD AUTO: 1.32 K/UL — SIGNIFICANT CHANGE UP (ref 1–3.3)
LYMPHOCYTES # BLD AUTO: 15.9 % — SIGNIFICANT CHANGE UP (ref 13–44)
MCHC RBC-ENTMCNC: 26.4 PG — LOW (ref 27–34)
MCHC RBC-ENTMCNC: 32.3 GM/DL — SIGNIFICANT CHANGE UP (ref 32–36)
MCV RBC AUTO: 81.7 FL — SIGNIFICANT CHANGE UP (ref 80–100)
MONOCYTES # BLD AUTO: 0.54 K/UL — SIGNIFICANT CHANGE UP (ref 0–0.9)
MONOCYTES NFR BLD AUTO: 6.5 % — SIGNIFICANT CHANGE UP (ref 2–14)
NEUTROPHILS # BLD AUTO: 6.12 K/UL — SIGNIFICANT CHANGE UP (ref 1.8–7.4)
NEUTROPHILS NFR BLD AUTO: 73.7 % — SIGNIFICANT CHANGE UP (ref 43–77)
NITRITE UR-MCNC: NEGATIVE — SIGNIFICANT CHANGE UP
NRBC # BLD: 0 /100 WBCS — SIGNIFICANT CHANGE UP (ref 0–0)
PH UR: 6 — SIGNIFICANT CHANGE UP (ref 5–8)
PLATELET # BLD AUTO: 646 K/UL — HIGH (ref 150–400)
POTASSIUM SERPL-MCNC: 4.2 MMOL/L — SIGNIFICANT CHANGE UP (ref 3.5–5.3)
POTASSIUM SERPL-SCNC: 4.2 MMOL/L — SIGNIFICANT CHANGE UP (ref 3.5–5.3)
PROT SERPL-MCNC: 8.8 G/DL — HIGH (ref 6–8.3)
PROT UR-MCNC: 30 MG/DL
PROTHROM AB SERPL-ACNC: 14.5 SEC — HIGH (ref 10.5–13.4)
RBC # BLD: 4.05 M/UL — LOW (ref 4.2–5.8)
RBC # FLD: 15.2 % — HIGH (ref 10.3–14.5)
SARS-COV-2 RNA SPEC QL NAA+PROBE: NEGATIVE — SIGNIFICANT CHANGE UP
SODIUM SERPL-SCNC: 137 MMOL/L — SIGNIFICANT CHANGE UP (ref 135–145)
SP GR SPEC: 1.02 — SIGNIFICANT CHANGE UP (ref 1–1.03)
UROBILINOGEN FLD QL: 0.2 E.U./DL — SIGNIFICANT CHANGE UP
WBC # BLD: 8.3 K/UL — SIGNIFICANT CHANGE UP (ref 3.8–10.5)
WBC # FLD AUTO: 8.3 K/UL — SIGNIFICANT CHANGE UP (ref 3.8–10.5)

## 2022-12-31 PROCEDURE — 99285 EMERGENCY DEPT VISIT HI MDM: CPT

## 2022-12-31 PROCEDURE — 99223 1ST HOSP IP/OBS HIGH 75: CPT | Mod: GC

## 2022-12-31 PROCEDURE — 93010 ELECTROCARDIOGRAM REPORT: CPT

## 2022-12-31 RX ORDER — OXYCODONE HYDROCHLORIDE 5 MG/1
5 TABLET ORAL EVERY 4 HOURS
Refills: 0 | Status: DISCONTINUED | OUTPATIENT
Start: 2022-12-31 | End: 2023-01-05

## 2022-12-31 RX ORDER — SODIUM CHLORIDE 9 MG/ML
1000 INJECTION INTRAMUSCULAR; INTRAVENOUS; SUBCUTANEOUS
Refills: 0 | Status: DISCONTINUED | OUTPATIENT
Start: 2022-12-31 | End: 2023-01-01

## 2022-12-31 RX ORDER — SODIUM CHLORIDE 9 MG/ML
1000 INJECTION, SOLUTION INTRAVENOUS
Refills: 0 | Status: DISCONTINUED | OUTPATIENT
Start: 2022-12-31 | End: 2022-12-31

## 2022-12-31 RX ORDER — NIFEDIPINE 30 MG
60 TABLET, EXTENDED RELEASE 24 HR ORAL DAILY
Refills: 0 | Status: DISCONTINUED | OUTPATIENT
Start: 2022-12-31 | End: 2023-01-05

## 2022-12-31 RX ORDER — VANCOMYCIN HCL 1 G
1500 VIAL (EA) INTRAVENOUS ONCE
Refills: 0 | Status: DISCONTINUED | OUTPATIENT
Start: 2022-12-31 | End: 2022-12-31

## 2022-12-31 RX ORDER — METOPROLOL TARTRATE 50 MG
50 TABLET ORAL
Refills: 0 | Status: DISCONTINUED | OUTPATIENT
Start: 2022-12-31 | End: 2023-01-05

## 2022-12-31 RX ORDER — ACETAMINOPHEN 500 MG
650 TABLET ORAL EVERY 6 HOURS
Refills: 0 | Status: DISCONTINUED | OUTPATIENT
Start: 2022-12-31 | End: 2022-12-31

## 2022-12-31 RX ORDER — TRAMADOL HYDROCHLORIDE 50 MG/1
25 TABLET ORAL EVERY 4 HOURS
Refills: 0 | Status: DISCONTINUED | OUTPATIENT
Start: 2022-12-31 | End: 2023-01-05

## 2022-12-31 RX ORDER — INFLUENZA VIRUS VACCINE 15; 15; 15; 15 UG/.5ML; UG/.5ML; UG/.5ML; UG/.5ML
0.5 SUSPENSION INTRAMUSCULAR ONCE
Refills: 0 | Status: DISCONTINUED | OUTPATIENT
Start: 2022-12-31 | End: 2023-01-05

## 2022-12-31 RX ORDER — SODIUM CHLORIDE 9 MG/ML
1000 INJECTION INTRAMUSCULAR; INTRAVENOUS; SUBCUTANEOUS ONCE
Refills: 0 | Status: COMPLETED | OUTPATIENT
Start: 2022-12-31 | End: 2022-12-31

## 2022-12-31 RX ORDER — KETOROLAC TROMETHAMINE 30 MG/ML
15 SYRINGE (ML) INJECTION EVERY 6 HOURS
Refills: 0 | Status: DISCONTINUED | OUTPATIENT
Start: 2022-12-31 | End: 2023-01-03

## 2022-12-31 RX ADMIN — SODIUM CHLORIDE 1000 MILLILITER(S): 9 INJECTION INTRAMUSCULAR; INTRAVENOUS; SUBCUTANEOUS at 17:47

## 2022-12-31 NOTE — ED PROVIDER NOTE - OBJECTIVE STATEMENT
52M h/o obesity, recent admission for L shoulder septic arthritis, s/p washout on 12/17, OR culture growing MRSA, on daptomycin 1000mg IV q24h,  PICC placement 12/23/22, duration of antibiotics is 4 weeks ( 12/18/22 - 1/14/23 ) who was sent in to ER for abnormal lab. 52M h/o obesity, recent admission for L shoulder septic arthritis, s/p washout on 12/17, OR culture growing MRSA, on daptomycin 1000mg IV q24h,  PICC placement 12/23/22, duration of antibiotics is 4 weeks ( 12/18/22 - 1/14/23 ) who was sent in to ER for abnormal lab - found to have elevated CPK 26K and also c/o muscle pains "all over" x 3 days. No f/c, no cp/sob, no dizziness or syncope. Pt admits he has not been drinking much. Sent in by Dr. Rojas.

## 2022-12-31 NOTE — ED ADULT TRIAGE NOTE - OTHER COMPLAINTS
patient reports "they sent me here for high blood pressure or high blood work, I'm not sure"-- patient presents with PICC line, on bloodwork from 12/30 CPK elevated-- patient c/o generalized body pain

## 2022-12-31 NOTE — H&P ADULT - PROBLEM SELECTOR PLAN 6
Platelet 646 on admission. Platelet 400-700s last admission. Likely reactive    Plan:  - Trend platelets On home nifedipine 60mg QD and lopressor 50mg BID    Plan:  - c/w nifedipine 60mg QD and lopressor 50mg BID      #Elevated protein gap  Protein 8.8, alb 3.1. Elevated last admission as well.  - f/u HIV

## 2022-12-31 NOTE — H&P ADULT - NSHPSOCIALHISTORY_GEN_ALL_CORE
Lives at home with friends. No smoking, alcohol, or recreational drug use history. Currently works as a . Came from [place] many years ago.

## 2022-12-31 NOTE — ED PROVIDER NOTE - CLINICAL SUMMARY MEDICAL DECISION MAKING FREE TEXT BOX
52M h/o obesity, recent admission for L shoulder septic arthritis, s/p washout on 12/17, OR culture growing MRSA, on daptomycin 1000mg IV q24h,  PICC placement 12/23/22, duration of antibiotics is 4 weeks ( 12/18/22 - 1/14/23 ) who was sent in to ER for abnormal lab - found to have elevated CPK 26K and also c/o muscle pains "all over" x 3 days. No f/c, no cp/sob, no dizziness or syncope. Pt admits he has not been drinking much. Sent in by Dr. Rojas.   VSS, pt with dry MM on exam, L shoulder improved, normal ROM, NVI, +muscle TTP. Pt with elevated CPK on outpt labs from yesterday, no JOSHUA. D/w Dr. [place] from ID who recommends STOP Daptomycin and can start Vancomycin 1500 as long as no JOSHUA. Pt also given IVfs. Ortho consulted and will follow during admission.   Stable for RMF.   Pt reports he got his home dose of IV dapto today so will wait on vanco dose until tomorrow.

## 2022-12-31 NOTE — H&P ADULT - PROBLEM SELECTOR PLAN 3
Hb 10.7 on admission, baseline 9-10s. No active signs of bleeding    Plan:  - Transfuse for Hb <7  - Maintain active type and screen  - f/u iron profile

## 2022-12-31 NOTE — H&P ADULT - PROBLEM SELECTOR PLAN 5
On home nifedipine 60mg QD and lopressor 50mg BID    Plan:  - c/w nifedipine 60mg QD and lopressor 50mg BID On home nifedipine 60mg QD and lopressor 50mg BID    Plan:  - c/w nifedipine 60mg QD and lopressor 50mg BID      #Elevated protein gap  Protein 8.8, alb 3.1. Elevated last admission as well.  - f/u HIV UA with few hyaline casts. Cr at baseline and producing clear/yellow urine without issue. Suspect 2/2 rhabdo.     Plan:  - Monitor Cr  - Management of rhabdo as above

## 2022-12-31 NOTE — H&P ADULT - PROBLEM SELECTOR PLAN 2
Admitted by ortho service for septic arthritis of left shoulder 12/17 with MRSA culture and discharged on daptomycin    Plan:  - ortho following, will follow up recs  - Pain management with tylenol 650mg for mild, tramadol 25mg q6h for moderate and oxycodone 5mg q4h for severe pain Admitted by ortho service for septic arthritis of left shoulder 12/17 with MRSA culture and discharged on daptomycin. Afebrile and normal WBC. No erythema or tenderness at site    Plan:  - ortho following, will follow up recs  - Pain management with toradol IV 15mg q6h for mild, tramadol 25mg q6h for moderate and oxycodone 5mg q4h for severe pain Admitted by ortho service for septic arthritis of left shoulder 12/17 with MRSA culture and discharged on daptomycin. Afebrile and normal WBC. No erythema or tenderness at site    Plan:  - ortho following, will follow up recs  - f/u blood culture  - Pain management with toradol IV 15mg q6h for mild, tramadol 25mg q6h for moderate and oxycodone 5mg q4h for severe pain

## 2022-12-31 NOTE — ED ADULT NURSE NOTE - OBJECTIVE STATEMENT
came to ED with elevated CKP, whole body aches for 3days. Denies chest pain, sob, dizziness, fever, dark urine, trauma event. A&OX4. stable at bed. PICC at Rt. AC.

## 2022-12-31 NOTE — H&P ADULT - PROBLEM SELECTOR PLAN 8
Fluids: s/p 1L NS  Electrolytes: Replete to keep K>4 and Mg>2  Nutrition: DASH  DVT ppx: SCDs  GI ppx: None  Code: Full code  Dispo: [facility] History of SVT last admission which improved to sinus tachycardia after vagal maneuver, card was following and was discharged on lopressor 50mg BID. EKG sinus tachycardia this admission    Plan:  - c/w lopressor 50mg BID

## 2022-12-31 NOTE — H&P ADULT - PROBLEM SELECTOR PLAN 4
and  on admission. Had normal LFTs earlier this month during last admission. No abd pain on exam, no alcohol use and no recent hepatotoxic medication use. Suspect 2/2 rhabdo    Plan:  - Trend LFTs  - If uptrending LFTs, will consider RUQ US and hepatitis/transaminitis work up  and  on admission. Had normal LFTs earlier this month during last admission. No abd pain on exam, no alcohol use and no recent hepatotoxic medication use. Suspect 2/2 rhabdo    Plan:  - Trend LFTs  - unlikely hepatic etiology likely source muscle breakdown    however given slight INR elevation (likely antibiotic use and vit K insuff given antimicrobial use) would check RUQ US to r/o hepatic disease

## 2022-12-31 NOTE — H&P ADULT - NSHPPHYSICALEXAM_GEN_ALL_CORE
PHYSICAL EXAM:  GENERAL: Pt lying in bed comfortably in NAD  HEAD:  Atraumatic   EYES: EOMI, PERRL, conjunctiva and sclera clear  ENT: Moist mucous membranes  NECK: Supple, No JVD  CHEST/LUNG: Clear to auscultation bilaterally; No rales, rhonchi, wheezing or rubs. Unlabored respirations  HEART: Regular rate and rhythm; No murmurs, rubs, or gallops  ABDOMEN: Bowel sounds present; Soft, Nontender, Nondistended. No guarding or rigidity    EXTREMITIES:  2+ Peripheral Pulses, brisk capillary refill. No clubbing, cyanosis, or edema  NERVOUS SYSTEM:  Alert & Oriented X3, speech clear. Answers questions appropriately. Facial movements symmetrical, no facial droop, tongue protrusion midline. Full and equal 5/5 strength B/L upper and lower extremities. Sensation intact. No motor drift. No deficits   MSK: left shoulder decreased active and passive ROM, other joints with normal ROM  SKIN: No rashes or lesions, no erythema or tenderness at left shoulder site

## 2022-12-31 NOTE — H&P ADULT - HISTORY OF PRESENT ILLNESS
CC: muscle pain  HPI: 52M PMHx septic arthritis (s/p washout 12/17), SVT, obesity presents to ED due to abnormal outpatient labs >20K CK while on daptomycin. Patient's left shoulder culture 12/17 was MRSA positive. Patient reports having a few days of muscle soreness. Denies any recent travel, trauma, or intense exercising. Does work as a  but has not been working or walking more than usual. Muscle soreness worse in bilateral thighs, leg, and right forearm (PICC is in right arm). No discomfort at PICC site. No recent travel. Denies headache, dizziness, fevers, chills, cough, chest pain, dyspnea, abd pain, dysuria, diarrhea, sick contacts or change in urinary or bowel habits    In the ED:    - VS: Tmax: 98.5, HR: 110, BP: 137/85, RR: 18, O2: 100% RA    - Pertinent Labs: , [place] 56.5[place] [address], Hb 10.7, plt 646, INR 1.22, protein 8.8, alb 3.1, alk mayra 166, ,     - Imaging: EKG: sinus tachycardia w/o ischemic changes    - Treatment/interventions: 1L NS    PMHx: septic arthritis (s/p washout 12/17), SVT, obesity  PSHx: left shoulder washout  Meds: See med rec  Allergies: None  Social: see below CC: muscle pain  HPI: 52M PMHx septic arthritis (s/p washout 12/17), HTN, SVT, obesity presents to ED due to abnormal outpatient labs >20K CK while on daptomycin. Patient's left shoulder culture 12/17 was MRSA positive. Patient reports having a few days of muscle soreness and some weakness. Admits to not drinking much fluids recently. Denies any recent travel, trauma, or intense exercising. Does work as a  but has not been working or walking more than usual. Muscle soreness worse in bilateral thighs, leg, and right forearm (PICC is in right arm). No discomfort at PICC site. No recent travel. Denies headache, dizziness, fevers, chills, cough, chest pain, dyspnea, abd pain, dysuria, diarrhea, sick contacts or change in urinary or bowel habits    In the ED:    - VS: Tmax: 98.5, HR: 110, BP: 137/85, RR: 18, O2: 100% RA    - Pertinent Labs: , [place] 56[place] [address], Hb 10.7, plt 646, INR 1.22, protein 8.8, alb 3.1, alk mayra 166, ,     - Imaging: EKG: sinus tachycardia w/o ischemic changes    - Treatment/interventions: 1L NS    PMHx: septic arthritis (s/p washout 12/17), HTN, SVT, obesity  PSHx: left shoulder washout  Meds: See med rec  Allergies: None  Social: see below

## 2022-12-31 NOTE — PATIENT PROFILE ADULT - FALL HARM RISK - UNIVERSAL INTERVENTIONS
Bed in lowest position, wheels locked, appropriate side rails in place/Call bell, personal items and telephone in reach/Instruct patient to call for assistance before getting out of bed or chair/Non-slip footwear when patient is out of bed/[place] to call system/Physically safe environment - no spills, clutter or unnecessary equipment/Purposeful Proactive Rounding/Room/bathroom lighting operational, light cord in reach

## 2022-12-31 NOTE — H&P ADULT - PROBLEM SELECTOR PLAN 1
Admitted for septic arthritis of left shoulder 12/17 with MRSA culture and discharged on daptomycin now with labs >20k CK and patient complaining of muscle soreness/pain. Patient received his dose of daptomycin today already. s/p 1L NS in ED.    Plan:  - d/c daptomycin  - f/u AM CK  - Can start vancomycin or ceftaroline tomorrow  - c/w fluids Admitted for septic arthritis of left shoulder 12/17 with MRSA culture and discharged on daptomycin now with labs >20k CK and patient complaining of muscle soreness/pain with some weakness. Admits to not drinking much fluids. UA clear but with blood and few granular and hyaline casts and normal kidney function. Patient received his dose of daptomycin today already. s/p 1L NS in ED.    Plan:  - d/c daptomycin  - f/u AM CK  - Can start vancomycin or ceftaroline tomorrow  - c/w fluids 200cc/hr, titrate with goal urine output of 200cc/hr  - Monitor strict intake and output  - Monitor Cr Admitted for septic arthritis of left shoulder 12/17 with MRSA culture and discharged on daptomycin now with labs >20k CK and patient complaining of muscle soreness/pain with some weakness. Admits to not drinking much fluids. UA clear but with blood and few granular and hyaline casts and normal kidney function. Patient received his dose of daptomycin today already. s/p 1L NS in ED.    Plan:  - d/c daptomycin  - f/u AM CK  - Can start vancomycin or ceftaroline tomorrow  - c/w NS 200cc/hr, titrate with goal urine output of 200cc/hr  - Monitor strict intake and output  - Monitor Cr Admitted for septic arthritis of left shoulder 12/17 with MRSA culture and discharged on daptomycin now with labs >20k CK and patient complaining of muscle soreness/pain with some weakness. Admits to not drinking much fluids. UA clear but with blood and few granular and hyaline casts and normal kidney function. Patient received his dose of daptomycin today already. s/p 1L NS in ED.    Plan:  - d/c daptomycin  - f/u AM CK  - f/u ID recs, possibly start vancomycin or ceftaroline tomorrow  - c/w NS 200cc/hr, titrate with goal urine output of 200cc/hr  - Monitor strict intake and output  - Monitor Cr

## 2022-12-31 NOTE — H&P ADULT - ATTENDING COMMENTS
52 year old male patient with recent diagnosis of  septic arthritis left shoulder joint (s/p washout 12/17), hypertension, SVT, obesity is sent  by outpatient ID physician to [facility] ED due to abnormal outpatient labs of creatine kinase>20K CK while he was on IV daptomycin. ED VS are:Tmax: 98.5, HR: 110, BP: 137/85, RR: 18, O2: 100% RA, his pertinent labs are  , CRP 56.5[place] [address], Hb 10.7, plt 646, INR 1.22, protein 8.8, alb 3.1, alk mayra 166, , . Patient is admitted for treatment of rhabdomyolysis.     1. Rhabdomyolysis  NS at 200cc/hour  monitor CK  d/c daptomycin     2. ATN  hyaline and granular cast in the setting of CK elevation   likely ATN from rhabdomyolysis   stop daptomycin  treat rhabdomyolysis   monitor creatine clearance     3. Septic Arthritis  ID consult   per their recs stop daptomycin, he is s/p today dose  antimicrobial management per ID

## 2022-12-31 NOTE — H&P ADULT - PROBLEM SELECTOR PLAN 9
Fluids: s/p 1L NS. NS  Electrolytes: Replete to keep K>4 and Mg>2  Nutrition: DASH  DVT ppx: SCDs  GI ppx: None  Code: Full code  Dispo: [facility]

## 2022-12-31 NOTE — ED PROVIDER NOTE - PHYSICAL EXAMINATION
PRINCIPAL DISCHARGE DIAGNOSIS  Diagnosis: Syncope and collapse  Assessment and Plan of Treatment: You are being transferred to a rehab facility to gain strength and balance.      SECONDARY DISCHARGE DIAGNOSES  Diagnosis: Lower extremity pain, left  Assessment and Plan of Treatment: Left hip and knee contusion after fall. No evidence of acute fractures or dislocation. Pain improving. Weight bearing as tolerated. You are being transferred to a rehab facility to gain strength and balance.    Diagnosis: Depression  Assessment and Plan of Treatment: Continue Effexor and Buspirone. Follow up with your Psychiatrist routinely. You may follow up at [facility] geriatric clinic; call [phone] for appointment.    Diagnosis: Hypothyroidism  Assessment and Plan of Treatment: Continue Levothyroxine 50mcg daily. Follow up with your primary care physician routinely to monitor your Thyroid Function Tests.
GEN: Well appearing, obese, well developed, awake, alert, oriented to person, place, time/situation and in no apparent distress. NTAF  ENT: Airway patent, Nasal mucosa clear. Mouth with somewhat dry mucosa.  EYES: Clear bilaterally. PERRL, EOMI  RESPIRATORY: Breathing comfortably with normal RR. No W/C/R, no hypoxia or resp distress.  CARDIAC: Regular rate and rhythm, no M/R/G  ABDOMEN: Soft, nontender, +bowel sounds, no rebound, rigidity, or guarding.  MSK: Range of motion is not limited, no deformities noted. No pain with ROM of left shoulder, nvi distally with +2 pulses. Pt reports some muscle TTP in ext.   NEURO: Alert and oriented, no focal deficits.  SKIN: Skin normal color for race, warm, dry and intact. No evidence of rash.  PSYCH: Alert and oriented to person, place, time/situation. normal mood and affect. no apparent risk to self or others.

## 2022-12-31 NOTE — H&P ADULT - PROBLEM SELECTOR PLAN 7
History of SVT last admission which improved to sinus tachycardia after vagal maneuver, card was following and was discharged on lopressor 50mg BID. EKG sinus tachycardia this admission    Plan:  - c/w lopressor 50mg BID Platelet 646 on admission. Platelet 400-700s last admission. Likely reactive    Plan:  - Trend platelets

## 2022-12-31 NOTE — H&P ADULT - NSHPLABSRESULTS_GEN_ALL_CORE
LABS:  12-31 @ 17:27 Creatine 01369 U/L [30 - 200]  cret                        10.7   8.30  )-----------( 646      ( 31 Dec 2022 17:27 )             33.1     12-31    137  |  102  |  13  ----------------------------<  109<H>  4.2   |  23  |  0.86    Ca    9.0      31 Dec 2022 17:27    TPro  8.8<H>  /  Alb  3.1<L>  /  TBili  0.4  /  DBili  x   /  AST  379<H>  /  ALT  172<H>  /  AlkPhos  166<H>  12-31    PT/INR - ( 31 Dec 2022 17:27 )   PT: 14.5 sec;   INR: 1.22          PTT - ( 31 Dec 2022 17:27 )  PTT:30.1 sec

## 2022-12-31 NOTE — H&P ADULT - ASSESSMENT
52M PMHx septic arthritis (s/p washout 12/17), SVT, obesity presents to ED due to abnormal outpatient labs >20K CK while on daptomycin, admitted for management of rhabdomyolysis.

## 2023-01-01 LAB
ALBUMIN SERPL ELPH-MCNC: 2.8 G/DL — LOW (ref 3.3–5)
ALP SERPL-CCNC: 155 U/L — HIGH (ref 40–120)
ALT FLD-CCNC: 148 U/L — HIGH (ref 10–45)
ANION GAP SERPL CALC-SCNC: 8 MMOL/L — SIGNIFICANT CHANGE UP (ref 5–17)
ANION GAP SERPL CALC-SCNC: 9 MMOL/L — SIGNIFICANT CHANGE UP (ref 5–17)
AST SERPL-CCNC: 305 U/L — HIGH (ref 10–40)
BILIRUB SERPL-MCNC: 0.4 MG/DL — SIGNIFICANT CHANGE UP (ref 0.2–1.2)
BLD GP AB SCN SERPL QL: NEGATIVE — SIGNIFICANT CHANGE UP
BUN SERPL-MCNC: 9 MG/DL — SIGNIFICANT CHANGE UP (ref 7–23)
BUN SERPL-MCNC: 9 MG/DL — SIGNIFICANT CHANGE UP (ref 7–23)
CALCIUM SERPL-MCNC: 8.7 MG/DL — SIGNIFICANT CHANGE UP (ref 8.4–10.5)
CALCIUM SERPL-MCNC: 8.7 MG/DL — SIGNIFICANT CHANGE UP (ref 8.4–10.5)
CHLORIDE SERPL-SCNC: 101 MMOL/L — SIGNIFICANT CHANGE UP (ref 96–108)
CHLORIDE SERPL-SCNC: 102 MMOL/L — SIGNIFICANT CHANGE UP (ref 96–108)
CK SERPL-CCNC: CRITICAL HIGH U/L (ref 30–200)
CK SERPL-CCNC: CRITICAL HIGH U/L (ref 30–200)
CO2 SERPL-SCNC: 26 MMOL/L — SIGNIFICANT CHANGE UP (ref 22–31)
CO2 SERPL-SCNC: 26 MMOL/L — SIGNIFICANT CHANGE UP (ref 22–31)
CREAT SERPL-MCNC: 0.8 MG/DL — SIGNIFICANT CHANGE UP (ref 0.5–1.3)
CREAT SERPL-MCNC: 0.81 MG/DL — SIGNIFICANT CHANGE UP (ref 0.5–1.3)
EGFR: 106 ML/MIN/1.73M2 — SIGNIFICANT CHANGE UP
EGFR: 106 ML/MIN/1.73M2 — SIGNIFICANT CHANGE UP
FERRITIN SERPL-MCNC: 640 NG/ML — HIGH (ref 30–400)
GLUCOSE SERPL-MCNC: 115 MG/DL — HIGH (ref 70–99)
GLUCOSE SERPL-MCNC: 93 MG/DL — SIGNIFICANT CHANGE UP (ref 70–99)
HCT VFR BLD CALC: 32.6 % — LOW (ref 39–50)
HGB BLD-MCNC: 10.2 G/DL — LOW (ref 13–17)
HIV 1+2 AB+HIV1 P24 AG SERPL QL IA: SIGNIFICANT CHANGE UP
IRON SATN MFR SERPL: 20 % — SIGNIFICANT CHANGE UP (ref 16–55)
IRON SATN MFR SERPL: 35 UG/DL — LOW (ref 45–165)
MAGNESIUM SERPL-MCNC: 1.8 MG/DL — SIGNIFICANT CHANGE UP (ref 1.6–2.6)
MCHC RBC-ENTMCNC: 26 PG — LOW (ref 27–34)
MCHC RBC-ENTMCNC: 31.3 GM/DL — LOW (ref 32–36)
MCV RBC AUTO: 83.2 FL — SIGNIFICANT CHANGE UP (ref 80–100)
NRBC # BLD: 0 /100 WBCS — SIGNIFICANT CHANGE UP (ref 0–0)
PHOSPHATE SERPL-MCNC: 3.2 MG/DL — SIGNIFICANT CHANGE UP (ref 2.5–4.5)
PLATELET # BLD AUTO: 621 K/UL — HIGH (ref 150–400)
POTASSIUM SERPL-MCNC: 3.9 MMOL/L — SIGNIFICANT CHANGE UP (ref 3.5–5.3)
POTASSIUM SERPL-MCNC: 4.1 MMOL/L — SIGNIFICANT CHANGE UP (ref 3.5–5.3)
POTASSIUM SERPL-SCNC: 3.9 MMOL/L — SIGNIFICANT CHANGE UP (ref 3.5–5.3)
POTASSIUM SERPL-SCNC: 4.1 MMOL/L — SIGNIFICANT CHANGE UP (ref 3.5–5.3)
PROT SERPL-MCNC: 8.4 G/DL — HIGH (ref 6–8.3)
RBC # BLD: 3.92 M/UL — LOW (ref 4.2–5.8)
RBC # FLD: 15.2 % — HIGH (ref 10.3–14.5)
RH IG SCN BLD-IMP: POSITIVE — SIGNIFICANT CHANGE UP
SODIUM SERPL-SCNC: 135 MMOL/L — SIGNIFICANT CHANGE UP (ref 135–145)
SODIUM SERPL-SCNC: 137 MMOL/L — SIGNIFICANT CHANGE UP (ref 135–145)
TIBC SERPL-MCNC: 176 UG/DL — LOW (ref 220–430)
UIBC SERPL-MCNC: 141 UG/DL — SIGNIFICANT CHANGE UP (ref 110–370)
WBC # BLD: 6.37 K/UL — SIGNIFICANT CHANGE UP (ref 3.8–10.5)
WBC # FLD AUTO: 6.37 K/UL — SIGNIFICANT CHANGE UP (ref 3.8–10.5)

## 2023-01-01 PROCEDURE — 99233 SBSQ HOSP IP/OBS HIGH 50: CPT | Mod: GC

## 2023-01-01 PROCEDURE — 99222 1ST HOSP IP/OBS MODERATE 55: CPT

## 2023-01-01 RX ORDER — SODIUM CHLORIDE 9 MG/ML
1000 INJECTION INTRAMUSCULAR; INTRAVENOUS; SUBCUTANEOUS
Refills: 0 | Status: DISCONTINUED | OUTPATIENT
Start: 2023-01-01 | End: 2023-01-03

## 2023-01-01 RX ORDER — VANCOMYCIN HCL 1 G
2000 VIAL (EA) INTRAVENOUS EVERY 12 HOURS
Refills: 0 | Status: COMPLETED | OUTPATIENT
Start: 2023-01-01 | End: 2023-01-02

## 2023-01-01 RX ADMIN — OXYCODONE HYDROCHLORIDE 5 MILLIGRAM(S): 5 TABLET ORAL at 18:55

## 2023-01-01 RX ADMIN — TRAMADOL HYDROCHLORIDE 25 MILLIGRAM(S): 50 TABLET ORAL at 21:49

## 2023-01-01 RX ADMIN — Medication 60 MILLIGRAM(S): at 05:11

## 2023-01-01 RX ADMIN — TRAMADOL HYDROCHLORIDE 25 MILLIGRAM(S): 50 TABLET ORAL at 08:49

## 2023-01-01 RX ADMIN — Medication 250 MILLIGRAM(S): at 17:55

## 2023-01-01 RX ADMIN — OXYCODONE HYDROCHLORIDE 5 MILLIGRAM(S): 5 TABLET ORAL at 00:17

## 2023-01-01 RX ADMIN — SODIUM CHLORIDE 200 MILLILITER(S): 9 INJECTION INTRAMUSCULAR; INTRAVENOUS; SUBCUTANEOUS at 00:39

## 2023-01-01 RX ADMIN — SODIUM CHLORIDE 200 MILLILITER(S): 9 INJECTION INTRAMUSCULAR; INTRAVENOUS; SUBCUTANEOUS at 20:50

## 2023-01-01 RX ADMIN — TRAMADOL HYDROCHLORIDE 25 MILLIGRAM(S): 50 TABLET ORAL at 09:50

## 2023-01-01 RX ADMIN — Medication 50 MILLIGRAM(S): at 05:11

## 2023-01-01 RX ADMIN — OXYCODONE HYDROCHLORIDE 5 MILLIGRAM(S): 5 TABLET ORAL at 01:17

## 2023-01-01 RX ADMIN — OXYCODONE HYDROCHLORIDE 5 MILLIGRAM(S): 5 TABLET ORAL at 17:58

## 2023-01-01 RX ADMIN — Medication 50 MILLIGRAM(S): at 17:56

## 2023-01-01 RX ADMIN — TRAMADOL HYDROCHLORIDE 25 MILLIGRAM(S): 50 TABLET ORAL at 20:49

## 2023-01-01 RX ADMIN — SODIUM CHLORIDE 200 MILLILITER(S): 9 INJECTION INTRAMUSCULAR; INTRAVENOUS; SUBCUTANEOUS at 17:56

## 2023-01-01 NOTE — PROGRESS NOTE ADULT - PROBLEM SELECTOR PLAN 9
Fluids: s/p 1L NS. NS  Electrolytes: Replete to keep K>4 and Mg>2  Nutrition: DASH  DVT ppx: SCDs  GI ppx: None  Code: Full code  Dispo: [facility]

## 2023-01-01 NOTE — CONSULT NOTE ADULT - SUBJECTIVE AND OBJECTIVE BOX
Orthopaedic Surgery Consult Note    Attending Physician: Monik      CC: Muscle pain and weakness    HPI:  HPI: 52M PMHx septic arthritis (s/p washout 12/17), HTN, SVT, obesity presents to ED due to abnormal outpatient labs >20K CK while on daptomycin. Patient's left shoulder culture 12/17 was MRSA positive. Patient reports having a few days of muscle soreness and some weakness. Admits to not drinking much fluids recently. Denies any recent travel, trauma, or intense exercising. Does work as a  but has not been working or walking more than usual. Muscle soreness worse in bilateral thighs, leg, and right forearm (PICC is in right arm). No discomfort at PICC site. No recent travel. Denies headache, dizziness, fevers, chills, cough, chest pain, dyspnea, abd pain, dysuria, diarrhea, sick contacts or change in urinary or bowel habits    In the ED:    - VS: Tmax: 98.5, HR: 110, BP: 137/85, RR: 18, O2: 100% RA    - Pertinent Labs: , CRP 56.5, [place] [address], Hb 10.7, plt 646, INR 1.22, protein 8.8, alb 3.1, alk mayra 166, ,     - Imaging: EKG: sinus tachycardia w/o ischemic changes    - Treatment/interventions: 1L NS      Allergies    No Known Allergies    Intolerances      PAST MEDICAL & SURGICAL HISTORY:  Obesity      History of septic arthritis      Septic arthritis of shoulder, left          Meds:  influenza   Vaccine 0.5 milliLiter(s) IntraMuscular once  ketorolac   Injectable 15 milliGRAM(s) IV Push every 6 hours PRN  metoprolol tartrate 50 milliGRAM(s) Oral two times a day  NIFEdipine XL 60 milliGRAM(s) Oral daily  oxyCODONE    IR 5 milliGRAM(s) Oral every 4 hours PRN  sodium chloride 0.9%. 1000 milliLiter(s) IV Continuous <Continuous>  traMADol 25 milliGRAM(s) Oral every 4 hours PRN      Family History:  Denies family history of bleeding disorders    Social History:   Pt is a nonsmoker  Social EtOH use     Review of Systems:  All review of systems are negative except for those mentioned in HPI.    Physical Exam:  General: Pt Alert and oriented, NAD  L shoulder  DSG C/D/I  Pulses: 2+ radial or dp, pt pulses, wwp, cap refill <3 seconds  Sensation: SILT sural/saph/sp/dp/ tibial or axillary/radial/median/ulnar distributions  Motor: EHL/FHL/TA/GS or axillary/AIN/PIN/ulnar firing    Vital Signs Last 24 Hrs  T(C): 36.7 (31 Dec 2022 22:35), Max: 37.1 (31 Dec 2022 18:52)  T(F): 98.1 (31 Dec 2022 22:35), Max: 98.8 (31 Dec 2022 18:52)  HR: 88 (31 Dec 2022 22:35) (88 - 110)  BP: 131/88 (31 Dec 2022 22:35) (131/88 - 143/93)  BP(mean): --  RR: 17 (31 Dec 2022 22:35) (17 - 18)  SpO2: 95% (31 Dec 2022 22:35) (95% - 100%)    Parameters below as of 31 Dec 2022 22:35  Patient On (Oxygen Delivery Method): room air          Labs:                        10.7   8.30  )-----------( 646      ( 31 Dec 2022 17:27 )             33.1     12-31    137  |  102  |  13  ----------------------------<  109<H>  4.2   |  23  |  0.86    Ca    9.0      31 Dec 2022 17:27    TPro  8.8<H>  /  Alb  3.1<L>  /  TBili  0.4  /  DBili  x   /  AST  379<H>  /  ALT  172<H>  /  AlkPhos  166<H>  12-31    PT/INR - ( 31 Dec 2022 17:27 )   PT: 14.5 sec;   INR: 1.22          PTT - ( 31 Dec 2022 17:27 )  PTT:30.1 sec    Imaging:     A/P: 52yMale      - Discussed with Attending, Dr. Rj Dunbar, PGY-2  Ortho Pager [phone]   Orthopaedic Surgery Consult Note    Attending Physician: Monik      CC: Muscle pain and weakness    HPI:  HPI: 52M PMHx septic arthritis (s/p washout 12/17), HTN, SVT, obesity presents to ED due to abnormal outpatient labs >20K CK while on daptomycin. Patient's left shoulder culture 12/17 was MRSA positive. Patient reports having a few days of muscle soreness and some weakness. Admits to not drinking much fluids recently. Denies any recent travel, trauma, or intense exercising. Does work as a  but has not been working or walking more than usual. Muscle soreness worse in bilateral thighs, leg, and right forearm (PICC is in right arm). No discomfort at PICC site. No recent travel. Denies headache, dizziness, fevers, chills, cough, chest pain, dyspnea, abd pain, dysuria, diarrhea, sick contacts or change in urinary or bowel habits    In the ED:    - VS: Tmax: 98.5, HR: 110, BP: 137/85, RR: 18, O2: 100% RA    - Pertinent Labs: , CRP 56.5, [place] [address], Hb 10.7, plt 646, INR 1.22, protein 8.8, alb 3.1, alk mayra 166, ,     - Imaging: EKG: sinus tachycardia w/o ischemic changes    - Treatment/interventions: 1L NS      Allergies    No Known Allergies    Intolerances      PAST MEDICAL & SURGICAL HISTORY:  Obesity      History of septic arthritis      Septic arthritis of shoulder, left          Meds:  influenza   Vaccine 0.5 milliLiter(s) IntraMuscular once  ketorolac   Injectable 15 milliGRAM(s) IV Push every 6 hours PRN  metoprolol tartrate 50 milliGRAM(s) Oral two times a day  NIFEdipine XL 60 milliGRAM(s) Oral daily  oxyCODONE    IR 5 milliGRAM(s) Oral every 4 hours PRN  sodium chloride 0.9%. 1000 milliLiter(s) IV Continuous <Continuous>  traMADol 25 milliGRAM(s) Oral every 4 hours PRN      Family History:  Denies family history of bleeding disorders    Social History:   Pt is a nonsmoker  Social EtOH use     Review of Systems:  All review of systems are negative except for those mentioned in HPI.    Physical Exam:  General: Pt Alert and oriented, NAD  L shoulder with portal sutures in place, slightly swollen but no TTP, no erythema, skin intact  Decreased ROM 2/2 to surgery, slight shoulder discomfort  Pulses: 2+ radial pulses, fingers wwp, cap refill <3 seconds  Sensation: SILT axillary/radial/median/ulnar distributions  Motor: axillary/AIN/PIN/ulnar firing    Vital Signs Last 24 Hrs  T(C): 36.7 (31 Dec 2022 22:35), Max: 37.1 (31 Dec 2022 18:52)  T(F): 98.1 (31 Dec 2022 22:35), Max: 98.8 (31 Dec 2022 18:52)  HR: 88 (31 Dec 2022 22:35) (88 - 110)  BP: 131/88 (31 Dec 2022 22:35) (131/88 - 143/93)  BP(mean): --  RR: 17 (31 Dec 2022 22:35) (17 - 18)  SpO2: 95% (31 Dec 2022 22:35) (95% - 100%)    Parameters below as of 31 Dec 2022 22:35  Patient On (Oxygen Delivery Method): room air          Labs:                        10.7   8.30  )-----------( 646      ( 31 Dec 2022 17:27 )             33.1     12-31    137  |  102  |  13  ----------------------------<  109<H>  4.2   |  23  |  0.86    Ca    9.0      31 Dec 2022 17:27    TPro  8.8<H>  /  Alb  3.1<L>  /  TBili  0.4  /  DBili  x   /  AST  379<H>  /  ALT  172<H>  /  AlkPhos  166<H>  12-31    PT/INR - ( 31 Dec 2022 17:27 )   PT: 14.5 sec;   INR: 1.22          PTT - ( 31 Dec 2022 17:27 )  PTT:30.1 sec        A/P: 52M PMHx septic arthritis (s/p washout 12/17, + MRSA), HTN, SVT, obesity admitted for treatment of rhabdomyolysis 2/2 daptomycin use.  - stable  - pain control  - no shoulder pain, sutures in place  - LUE WBAT  - continue management of rhabdomyolysis  - final recommendations for PT/OT regimen while in house pending discussion with attending Dr. Ozuna  - Discussed with Attending, Dr. Rj Dunbar, PGY-2  Ortho Pager [phone]   Orthopaedic Surgery Consult Note    Attending Physician: Sulma      CC: Muscle pain and weakness    HPI:  HPI: 52M PMHx septic arthritis (s/p washout 12/17), HTN, SVT, obesity presents to ED due to abnormal outpatient labs >20K CK while on daptomycin. Patient's left shoulder culture 12/17 was MRSA positive. Patient reports having a few days of muscle soreness and some weakness. Admits to not drinking much fluids recently. Denies any recent travel, trauma, or intense exercising. Does work as a  but has not been working or walking more than usual. Muscle soreness worse in bilateral thighs, leg, and right forearm (PICC is in right arm). No discomfort at PICC site. No recent travel. Denies headache, dizziness, fevers, chills, cough, chest pain, dyspnea, abd pain, dysuria, diarrhea, sick contacts or change in urinary or bowel habits    In the ED:    - VS: Tmax: 98.5, HR: 110, BP: 137/85, RR: 18, O2: 100% RA    - Pertinent Labs: , [place] 56.5[place] [address], Hb 10.7, plt 646, INR 1.22, protein 8.8, alb 3.1, alk mayra 166, ,     - Imaging: EKG: sinus tachycardia w/o ischemic changes    - Treatment/interventions: 1L NS      Allergies    No Known Allergies    Intolerances      PAST MEDICAL & SURGICAL HISTORY:  Obesity      History of septic arthritis      Septic arthritis of shoulder, left          Meds:  influenza   Vaccine 0.5 milliLiter(s) IntraMuscular once  ketorolac   Injectable 15 milliGRAM(s) IV Push every 6 hours PRN  metoprolol tartrate 50 milliGRAM(s) Oral two times a day  NIFEdipine XL 60 milliGRAM(s) Oral daily  oxyCODONE    IR 5 milliGRAM(s) Oral every 4 hours PRN  sodium chloride 0.9%. 1000 milliLiter(s) IV Continuous <Continuous>  traMADol 25 milliGRAM(s) Oral every 4 hours PRN      Family History:  Denies family history of bleeding disorders    Social History:   Pt is a nonsmoker  Social EtOH use     Review of Systems:  All review of systems are negative except for those mentioned in HPI.    Physical Exam:  General: Pt Alert and oriented, NAD  L shoulder with portal sutures in place, slightly swollen but no TTP, no erythema, skin intact  Decreased ROM 2/2 to surgery, slight shoulder discomfort  Pulses: 2+ radial pulses, fingers wwp, cap refill <3 seconds  Sensation: SILT axillary/radial/median/ulnar distributions  Motor: axillary/AIN/PIN/ulnar firing    Vital Signs Last 24 Hrs  T(C): 36.7 (31 Dec 2022 22:35), Max: 37.1 (31 Dec 2022 18:52)  T(F): 98.1 (31 Dec 2022 22:35), Max: 98.8 (31 Dec 2022 18:52)  HR: 88 (31 Dec 2022 22:35) (88 - 110)  BP: 131/88 (31 Dec 2022 22:35) (131/88 - 143/93)  BP(mean): --  RR: 17 (31 Dec 2022 22:35) (17 - 18)  SpO2: 95% (31 Dec 2022 22:35) (95% - 100%)    Parameters below as of 31 Dec 2022 22:35  Patient On (Oxygen Delivery Method): room air          Labs:                        10.7   8.30  )-----------( 646      ( 31 Dec 2022 17:27 )             33.1     12-31    137  |  102  |  13  ----------------------------<  109<H>  4.2   |  23  |  0.86    Ca    9.0      31 Dec 2022 17:27    TPro  8.8<H>  /  Alb  3.1<L>  /  TBili  0.4  /  DBili  x   /  AST  379<H>  /  ALT  172<H>  /  AlkPhos  166<H>  12-31    PT/INR - ( 31 Dec 2022 17:27 )   PT: 14.5 sec;   INR: 1.22          PTT - ( 31 Dec 2022 17:27 )  PTT:30.1 sec        A/P: 52M PMHx septic arthritis (s/p washout 12/17, + MRSA), HTN, SVT, obesity admitted for treatment of rhabdomyolysis 2/2 daptomycin use.  - stable  - pain control  - no shoulder pain, sutures in place  - LUE WBAT  - continue management of rhabdomyolysis  - final recommendations for PT/OT regimen while in house pending discussion with attending Dr. Ozuna  - Discussed with Attending, Dr. Rj Dunbar, PGY-2  Ortho Pager [phone]

## 2023-01-01 NOTE — PHYSICAL THERAPY INITIAL EVALUATION ADULT - GROSSLY INTACT, SENSORY
[No Acute Distress] : no acute distress [Well Nourished] : well nourished [Well Developed] : well developed [Normal Voice/Communication] : normal voice/communication [Well-Appearing] : well-appearing [PERRL] : pupils equal round and reactive to light Grossly Intact [Normal Sclera/Conjunctiva] : normal sclera/conjunctiva [EOMI] : extraocular movements intact [No JVD] : no jugular venous distention [Normal Outer Ear/Nose] : the outer ears and nose were normal in appearance [Normal Oropharynx] : the oropharynx was normal [Normal TMs] : both tympanic membranes were normal [Supple] : supple [No Lymphadenopathy] : no lymphadenopathy [Thyroid Normal, No Nodules] : the thyroid was normal and there were no nodules present [No Respiratory Distress] : no respiratory distress  [Clear to Auscultation] : lungs were clear to auscultation bilaterally [No Accessory Muscle Use] : no accessory muscle use [Normal Rate] : normal rate  [Normal S1, S2] : normal S1 and S2 [Regular Rhythm] : with a regular rhythm [No Carotid Bruits] : no carotid bruits [No Abdominal Bruit] : a ~M bruit was not heard ~T in the abdomen [No Varicosities] : no varicosities [No Edema] : there was no peripheral edema [Pedal Pulses Present] : the pedal pulses are present [Normal Appearance] : normal in appearance [No Extremity Clubbing/Cyanosis] : no extremity clubbing/cyanosis [No Palpable Aorta] : no palpable aorta [Soft] : abdomen soft [Non Tender] : non-tender [Non-distended] : non-distended [No Masses] : no abdominal mass palpated [No HSM] : no HSM [Normal Posterior Cervical Nodes] : no posterior cervical lymphadenopathy [Normal Bowel Sounds] : normal bowel sounds [Normal Anterior Cervical Nodes] : no anterior cervical lymphadenopathy [No Spinal Tenderness] : no spinal tenderness [No CVA Tenderness] : no CVA  tenderness [No Joint Swelling] : no joint swelling [Grossly Normal Strength/Tone] : grossly normal strength/tone [No Rash] : no rash [Normal Gait] : normal gait [Coordination Grossly Intact] : coordination grossly intact [No Focal Deficits] : no focal deficits [Memory Grossly Normal] : memory grossly normal [Deep Tendon Reflexes (DTR)] : deep tendon reflexes were 2+ and symmetric [Speech Grossly Normal] : speech grossly normal [Alert and Oriented x3] : oriented to person, place, and time [Normal Affect] : the affect was normal [Normal Mood] : the mood was normal [Normal Insight/Judgement] : insight and judgment were intact [II] : a grade 2

## 2023-01-01 NOTE — CONSULT NOTE ADULT - ASSESSMENT
52M h/o obesity p/w L shoulder pain and swelling x 1 week, found to have L shoulder septic arthritis.  Now s/p washout on 12/17.  OR culture growing MRSA. Discharged on daptomycin; now readmitted with rhabdomyolysis.  - recommend hold daptomycin (would add to list of intolerances)  - start vancomycin 2g IV q12h; check trough prior to 4th dose

## 2023-01-01 NOTE — PROGRESS NOTE ADULT - PROBLEM SELECTOR PLAN 1
Admitted for septic arthritis of left shoulder 12/17 with MRSA culture and discharged on daptomycin now with labs >20k CK and patient complaining of muscle soreness/pain with some weakness. Admits to not drinking much fluids. UA clear but with blood and few granular and hyaline casts and normal kidney function. Patient received his dose of daptomycin today already. s/p 1L NS in ED.    Plan:  - d/c daptomycin  - f/u CK this evening  - f/u ID recs, possibly start vancomycin or ceftaroline, pending recd  - c/w NS 200cc/hr, titrate with goal urine output of 200cc/hr  - Monitor strict intake and output  - Monitor Cr Admitted for septic arthritis of left shoulder 12/17 with MRSA culture and discharged on daptomycin now with labs >20k CK and patient complaining of muscle soreness/pain with some weakness. Admits to not drinking much fluids. UA clear but with blood and few granular and hyaline casts and normal kidney function. Patient received his dose of daptomycin today already. s/p 1L NS in ED.    Plan:  - d/c daptomycin  - f/u CK this evening  - ID consulted: start on Vancomycin 2g IV q12, check level before 4th dose  - c/w NS 200cc/hr, titrate with goal urine output of 200cc/hr  - Monitor strict intake and output  - Monitor Cr

## 2023-01-01 NOTE — PHYSICAL THERAPY INITIAL EVALUATION ADULT - RANGE OF MOTION EXAMINATION, REHAB EVAL
LUE shoulder flexion limited ~90 degrees 2/2 pain/bilateral lower extremity was ROM was WNL (within normal limits)/Right UE ROM was WFL (within functional limits)

## 2023-01-01 NOTE — PHYSICAL THERAPY INITIAL EVALUATION ADULT - PERTINENT HX OF CURRENT PROBLEM, REHAB EVAL
Patient is a 52 year old male PMHx septic arthritis (s/p washout 12/17), SVT, obesity presents to ED due to abnormal outpatient labs >20K CK while on daptomycin, admitted for management of rhabdomyolysis.

## 2023-01-01 NOTE — PROGRESS NOTE ADULT - PROBLEM SELECTOR PLAN 2
Admitted by ortho service for septic arthritis of left shoulder 12/17 with MRSA culture and discharged on daptomycin. Afebrile and normal WBC. No erythema or tenderness at site    Plan:  - ortho following, will follow up recs  - f/u blood culture  - Pain management with toradol IV 15mg q6h for mild, tramadol 25mg q6h for moderate and oxycodone 5mg q4h for severe pain Admitted by ortho service for septic arthritis of left shoulder 12/17 with MRSA culture and discharged on daptomycin. Afebrile and normal WBC. No erythema or tenderness at site    Plan:  - ortho following, will follow up recs  - f/u blood culture  - Pain management with toradol IV 15mg q6h for mild, tramadol 25mg q6h for moderate and oxycodone 5mg q4h for severe pain  - abx as above

## 2023-01-01 NOTE — CONSULT NOTE ADULT - SUBJECTIVE AND OBJECTIVE BOX
HPI:  CC: muscle pain  HPI: 52M PMHx septic arthritis (s/p washout ), HTN, SVT, obesity presents to ED due to abnormal outpatient labs >20K CK while on daptomycin. Patient's left shoulder culture  was MRSA positive. Patient reports having a few days of muscle soreness and some weakness. Admits to not drinking much fluids recently. Denies any recent travel, trauma, or intense exercising. Does work as a  but has not been working or walking more than usual. Muscle soreness worse in bilateral thighs, leg, and right forearm (PICC is in right arm). No discomfort at PICC site. No recent travel. Denies headache, dizziness, fevers, chills, cough, chest pain, dyspnea, abd pain, dysuria, diarrhea, sick contacts or change in urinary or bowel habits    In the ED:    - VS: Tmax: 98.5, HR: 110, BP: 137/85, RR: 18, O2: 100% RA    - Pertinent Labs: , [place] 56.5, [place] [address], Hb 10.7, plt 646, INR 1.22, protein 8.8, alb 3.1, alk mayra 166, ,     - Imaging: EKG: sinus tachycardia w/o ischemic changes    - Treatment/interventions: 1L NS    PMHx: septic arthritis (s/p washout ), HTN, SVT, obesity  PSHx: left shoulder washout  Meds: See med rec  Allergies: None  Social: see below (31 Dec 2022 22:09)      PAST MEDICAL & SURGICAL HISTORY:  Obesity      History of septic arthritis      Septic arthritis of shoulder, left            REVIEW OF SYSTEMS:    General:	 no weakness; no fevers, no chills  Skin/Breast: no rash  Respiratory and Thorax: no SOB, no cough  Cardiovascular:	No chest pain  Gastrointestinal:	 no nausea, vomiting , diarrhea  Genitourinary:	no dysuria, no difficulty urinating, no hematuria  Musculoskeletal:	no weakness, no joint swelling/pain  Neurological:	no focal weakness/numbness  Endocrine:	no polyuria, no polydipsia      ANTIBIOTICS:  MEDICATIONS  (STANDING):  influenza   Vaccine 0.5 milliLiter(s) IntraMuscular once  metoprolol tartrate 50 milliGRAM(s) Oral two times a day  NIFEdipine XL 60 milliGRAM(s) Oral daily  sodium chloride 0.9%. 1000 milliLiter(s) (200 mL/Hr) IV Continuous <Continuous>  vancomycin  IVPB 2000 milliGRAM(s) IV Intermittent every 12 hours    MEDICATIONS  (PRN):  ketorolac   Injectable 15 milliGRAM(s) IV Push every 6 hours PRN Mild Pain (1 - 3)  oxyCODONE    IR 5 milliGRAM(s) Oral every 4 hours PRN Severe Pain (7 - 10)  traMADol 25 milliGRAM(s) Oral every 4 hours PRN Moderate Pain (4 - 6)      Allergies    No Known Allergies    Intolerances        SOCIAL HISTORY:    FAMILY HISTORY:      Vital Signs Last 24 Hrs  T(C): 37.2 (2023 13:32), Max: 37.2 (2023 13:32)  T(F): 99 (2023 13:32), Max: 99 (2023 13:32)  HR: 91 (2023 13:32) (85 - 106)  BP: 138/89 (2023 13:32) (131/88 - 143/93)  BP(mean): --  RR: 18 (2023 13:32) (17 - 18)  SpO2: 99% (2023 13:32) (95% - 100%)    Parameters below as of 2023 13:32  Patient On (Oxygen Delivery Method): room air        PHYSICAL EXAM:  Constitutional: NAD  Eyes: FLORY, EOMI  Ear/Nose/Throat: no oral lesion, no sinus tenderness on percussion	  Neck: no JVD, no lymphadenopathy, supple  Respiratory: CTA rocio  Cardiovascular: S1S2 RRR, no murmurs  Gastrointestinal: soft, (+) BS, no HSM  Extremities:no e/e/c  Vascular: DP Pulse: right normal; left normal            LABS:                        10.2   6.37  )-----------( 621      ( 2023 07:20 )             32.6     01-    137  |  102  |  9   ----------------------------<  93  4.1   |  26  |  0.80    Ca    8.7      2023 07:20  Phos  3.2     01-  Mg     1.8     -    TPro  8.4<H>  /  Alb  2.8<L>  /  TBili  0.4  /  DBili  x   /  AST  305<H>  /  ALT  148<H>  /  AlkPhos  155<H>      PT/INR - ( 31 Dec 2022 17:27 )   PT: 14.5 sec;   INR: 1.22          PTT - ( 31 Dec 2022 17:27 )  PTT:30.1 sec  Urinalysis Basic - ( 31 Dec 2022 19:44 )    Color: Yellow / Appearance: Clear / S.025 / pH: x  Gluc: x / Ketone: NEGATIVE  / Bili: Negative / Urobili: 0.2 E.U./dL   Blood: x / Protein: 30 mg/dL / Nitrite: NEGATIVE   Leuk Esterase: NEGATIVE / RBC: < 5 /HPF / WBC 5-10 /HPF   Sq Epi: x / Non Sq Epi: 0-5 /HPF / Bacteria: Present /HPF        MICROBIOLOGY: reviewed  RADIOLOGY & ADDITIONAL STUDIES: reviewed

## 2023-01-01 NOTE — PROGRESS NOTE ADULT - PROBLEM SELECTOR PLAN 6
On home nifedipine 60mg QD and lopressor 50mg BID    Plan:  - c/w nifedipine 60mg QD and lopressor 50mg BID      #Elevated protein gap  Protein 8.8, alb 3.1. Elevated last admission as well.  - f/u HIV

## 2023-01-01 NOTE — PROVIDER CONTACT NOTE (CRITICAL VALUE NOTIFICATION) - SITUATION
Patient admitted for elevated ck, and myioathy. Has been here receiving fluids. Patient is A&Ox4, VSS, with no signs of distress. Recommended to provider ordering fluid replacement again for patient.

## 2023-01-02 LAB
ALBUMIN SERPL ELPH-MCNC: 3 G/DL — LOW (ref 3.3–5)
ALP SERPL-CCNC: 144 U/L — HIGH (ref 40–120)
ALT FLD-CCNC: 142 U/L — HIGH (ref 10–45)
ANION GAP SERPL CALC-SCNC: 8 MMOL/L — SIGNIFICANT CHANGE UP (ref 5–17)
AST SERPL-CCNC: 233 U/L — HIGH (ref 10–40)
BILIRUB SERPL-MCNC: 0.3 MG/DL — SIGNIFICANT CHANGE UP (ref 0.2–1.2)
BUN SERPL-MCNC: 11 MG/DL — SIGNIFICANT CHANGE UP (ref 7–23)
CALCIUM SERPL-MCNC: 8.8 MG/DL — SIGNIFICANT CHANGE UP (ref 8.4–10.5)
CHLORIDE SERPL-SCNC: 100 MMOL/L — SIGNIFICANT CHANGE UP (ref 96–108)
CK SERPL-CCNC: CRITICAL HIGH U/L (ref 30–200)
CK SERPL-CCNC: CRITICAL HIGH U/L (ref 30–200)
CO2 SERPL-SCNC: 26 MMOL/L — SIGNIFICANT CHANGE UP (ref 22–31)
CREAT SERPL-MCNC: 0.91 MG/DL — SIGNIFICANT CHANGE UP (ref 0.5–1.3)
EGFR: 101 ML/MIN/1.73M2 — SIGNIFICANT CHANGE UP
GLUCOSE SERPL-MCNC: 90 MG/DL — SIGNIFICANT CHANGE UP (ref 70–99)
HCT VFR BLD CALC: 32.5 % — LOW (ref 39–50)
HGB BLD-MCNC: 10.3 G/DL — LOW (ref 13–17)
MAGNESIUM SERPL-MCNC: 1.8 MG/DL — SIGNIFICANT CHANGE UP (ref 1.6–2.6)
MCHC RBC-ENTMCNC: 26.3 PG — LOW (ref 27–34)
MCHC RBC-ENTMCNC: 31.7 GM/DL — LOW (ref 32–36)
MCV RBC AUTO: 82.9 FL — SIGNIFICANT CHANGE UP (ref 80–100)
NRBC # BLD: 0 /100 WBCS — SIGNIFICANT CHANGE UP (ref 0–0)
PHOSPHATE SERPL-MCNC: 3.8 MG/DL — SIGNIFICANT CHANGE UP (ref 2.5–4.5)
PLATELET # BLD AUTO: 544 K/UL — HIGH (ref 150–400)
POTASSIUM SERPL-MCNC: 3.9 MMOL/L — SIGNIFICANT CHANGE UP (ref 3.5–5.3)
POTASSIUM SERPL-SCNC: 3.9 MMOL/L — SIGNIFICANT CHANGE UP (ref 3.5–5.3)
PROT SERPL-MCNC: 8.3 G/DL — SIGNIFICANT CHANGE UP (ref 6–8.3)
RBC # BLD: 3.92 M/UL — LOW (ref 4.2–5.8)
RBC # FLD: 15.3 % — HIGH (ref 10.3–14.5)
SODIUM SERPL-SCNC: 134 MMOL/L — LOW (ref 135–145)
WBC # BLD: 6.5 K/UL — SIGNIFICANT CHANGE UP (ref 3.8–10.5)
WBC # FLD AUTO: 6.5 K/UL — SIGNIFICANT CHANGE UP (ref 3.8–10.5)

## 2023-01-02 PROCEDURE — 99233 SBSQ HOSP IP/OBS HIGH 50: CPT | Mod: GC

## 2023-01-02 PROCEDURE — 76705 ECHO EXAM OF ABDOMEN: CPT | Mod: 26

## 2023-01-02 RX ORDER — MAGNESIUM OXIDE 400 MG ORAL TABLET 241.3 MG
400 TABLET ORAL ONCE
Refills: 0 | Status: COMPLETED | OUTPATIENT
Start: 2023-01-02 | End: 2023-01-02

## 2023-01-02 RX ORDER — SODIUM CHLORIDE 9 MG/ML
1000 INJECTION INTRAMUSCULAR; INTRAVENOUS; SUBCUTANEOUS
Refills: 0 | Status: DISCONTINUED | OUTPATIENT
Start: 2023-01-02 | End: 2023-01-03

## 2023-01-02 RX ADMIN — OXYCODONE HYDROCHLORIDE 5 MILLIGRAM(S): 5 TABLET ORAL at 07:09

## 2023-01-02 RX ADMIN — SODIUM CHLORIDE 200 MILLILITER(S): 9 INJECTION INTRAMUSCULAR; INTRAVENOUS; SUBCUTANEOUS at 17:06

## 2023-01-02 RX ADMIN — SODIUM CHLORIDE 200 MILLILITER(S): 9 INJECTION INTRAMUSCULAR; INTRAVENOUS; SUBCUTANEOUS at 05:43

## 2023-01-02 RX ADMIN — OXYCODONE HYDROCHLORIDE 5 MILLIGRAM(S): 5 TABLET ORAL at 17:24

## 2023-01-02 RX ADMIN — OXYCODONE HYDROCHLORIDE 5 MILLIGRAM(S): 5 TABLET ORAL at 23:00

## 2023-01-02 RX ADMIN — TRAMADOL HYDROCHLORIDE 25 MILLIGRAM(S): 50 TABLET ORAL at 11:45

## 2023-01-02 RX ADMIN — OXYCODONE HYDROCHLORIDE 5 MILLIGRAM(S): 5 TABLET ORAL at 02:32

## 2023-01-02 RX ADMIN — Medication 60 MILLIGRAM(S): at 05:42

## 2023-01-02 RX ADMIN — Medication 50 MILLIGRAM(S): at 17:06

## 2023-01-02 RX ADMIN — Medication 250 MILLIGRAM(S): at 05:43

## 2023-01-02 RX ADMIN — OXYCODONE HYDROCHLORIDE 5 MILLIGRAM(S): 5 TABLET ORAL at 01:32

## 2023-01-02 RX ADMIN — TRAMADOL HYDROCHLORIDE 25 MILLIGRAM(S): 50 TABLET ORAL at 12:45

## 2023-01-02 RX ADMIN — Medication 250 MILLIGRAM(S): at 17:06

## 2023-01-02 RX ADMIN — OXYCODONE HYDROCHLORIDE 5 MILLIGRAM(S): 5 TABLET ORAL at 16:24

## 2023-01-02 RX ADMIN — Medication 50 MILLIGRAM(S): at 05:42

## 2023-01-02 RX ADMIN — OXYCODONE HYDROCHLORIDE 5 MILLIGRAM(S): 5 TABLET ORAL at 06:09

## 2023-01-02 RX ADMIN — MAGNESIUM OXIDE 400 MG ORAL TABLET 400 MILLIGRAM(S): 241.3 TABLET ORAL at 13:08

## 2023-01-02 RX ADMIN — SODIUM CHLORIDE 200 MILLILITER(S): 9 INJECTION INTRAMUSCULAR; INTRAVENOUS; SUBCUTANEOUS at 21:51

## 2023-01-02 RX ADMIN — OXYCODONE HYDROCHLORIDE 5 MILLIGRAM(S): 5 TABLET ORAL at 22:22

## 2023-01-02 NOTE — OCCUPATIONAL THERAPY INITIAL EVALUATION ADULT - DIAGNOSIS, OT EVAL
Pt presents with L shoulder pain with shoulder flexion demonstrating limited ROM and strength affecting ADL performance

## 2023-01-02 NOTE — OCCUPATIONAL THERAPY INITIAL EVALUATION ADULT - LIVES WITH, PROFILE
Pt lives in apt with 3 FOS. Pt reports since discharge he has been ind with all ADLs however decrease in ROM/ strength with pain persists/alone

## 2023-01-02 NOTE — PROGRESS NOTE ADULT - PROBLEM SELECTOR PLAN 9
Fluids: s/p 1L NS. NS  Electrolytes: Replete to keep K>4 and Mg>2  Nutrition: DASH  DVT ppx: SCDs  GI ppx: None  Code: Full code  Dispo: [facility], [place]

## 2023-01-02 NOTE — OCCUPATIONAL THERAPY INITIAL EVALUATION ADULT - RANGE OF MOTION EXAMINATION, UPPER EXTREMITY
L shoulder 10 degrees flexion, Elbow and wrist flexion/extension WFL/Right UE Active ROM was WFL (within functional limits)

## 2023-01-02 NOTE — PROGRESS NOTE ADULT - PROBLEM SELECTOR PLAN 1
Admitted for septic arthritis of left shoulder 12/17 with MRSA culture and discharged on daptomycin now with labs >20k CK and patient complaining of muscle soreness/pain with some weakness. Admits to not drinking much fluids. UA clear but with blood and few granular and hyaline casts and normal kidney function. Patient received his dose of daptomycin today already. s/p 1L NS in ED. This AM CK was uptrending    Plan:  - d/c daptomycin  - f/u CK this Afternoon  - ID consulted: start on Vancomycin 2g IV q12, check level before 4th dose  - c/w NS 200cc/hr, titrate with goal urine output of 200cc/hr. If CK uptrending on afternoon labs, increase fluids to 250 cc/hr  - Monitor strict intake and output  - Monitor Cr

## 2023-01-02 NOTE — OCCUPATIONAL THERAPY INITIAL EVALUATION ADULT - MUSCLE TONE ASSESSMENT, REHAB EVAL
L UE noted to have increase swelling and decrease shoulder ROM, possibly presenting w/ frozen shoulder

## 2023-01-02 NOTE — OCCUPATIONAL THERAPY INITIAL EVALUATION ADULT - LEVEL OF INDEPENDENCE: DRESS LOWER BODY, OT EVAL
Pt able to doff socks and initiate donning however 2/2 decrease L UE strength/pain unable to pull sock up/moderate assist (50% patients effort)

## 2023-01-02 NOTE — PROGRESS NOTE ADULT - PROBLEM SELECTOR PLAN 2
Admitted by ortho service for septic arthritis of left shoulder 12/17 with MRSA culture and discharged on daptomycin. Afebrile and normal WBC. No erythema or tenderness at site    Plan:  - ortho following, will follow up recs  - f/u blood culture  - Pain management with toradol IV 15mg q6h for mild, tramadol 25mg q6h for moderate and oxycodone 5mg q4h for severe pain  - abx as above

## 2023-01-03 DIAGNOSIS — I10 ESSENTIAL (PRIMARY) HYPERTENSION: ICD-10-CM

## 2023-01-03 DIAGNOSIS — E66.9 OBESITY, UNSPECIFIED: ICD-10-CM

## 2023-01-03 DIAGNOSIS — M00.012 STAPHYLOCOCCAL ARTHRITIS, LEFT SHOULDER: ICD-10-CM

## 2023-01-03 DIAGNOSIS — I47.1 SUPRAVENTRICULAR TACHYCARDIA: ICD-10-CM

## 2023-01-03 DIAGNOSIS — R00.0 TACHYCARDIA, UNSPECIFIED: ICD-10-CM

## 2023-01-03 DIAGNOSIS — B95.61 METHICILLIN SUSCEPTIBLE STAPHYLOCOCCUS AUREUS INFECTION AS THE CAUSE OF DISEASES CLASSIFIED ELSEWHERE: ICD-10-CM

## 2023-01-03 DIAGNOSIS — I51.7 CARDIOMEGALY: ICD-10-CM

## 2023-01-03 DIAGNOSIS — E87.1 HYPO-OSMOLALITY AND HYPONATREMIA: ICD-10-CM

## 2023-01-03 PROBLEM — Z87.39 PERSONAL HISTORY OF OTHER DISEASES OF THE MUSCULOSKELETAL SYSTEM AND CONNECTIVE TISSUE: Chronic | Status: ACTIVE | Noted: 2022-12-31

## 2023-01-03 LAB
ALBUMIN SERPL ELPH-MCNC: 2.9 G/DL — LOW (ref 3.3–5)
ALP SERPL-CCNC: 142 U/L — HIGH (ref 40–120)
ALT FLD-CCNC: 139 U/L — HIGH (ref 10–45)
ANION GAP SERPL CALC-SCNC: 10 MMOL/L — SIGNIFICANT CHANGE UP (ref 5–17)
AST SERPL-CCNC: 203 U/L — HIGH (ref 10–40)
BILIRUB DIRECT SERPL-MCNC: <0.2 MG/DL — SIGNIFICANT CHANGE UP (ref 0–0.3)
BILIRUB INDIRECT FLD-MCNC: SIGNIFICANT CHANGE UP MG/DL (ref 0.2–1)
BILIRUB SERPL-MCNC: 0.2 MG/DL — SIGNIFICANT CHANGE UP (ref 0.2–1.2)
BUN SERPL-MCNC: 9 MG/DL — SIGNIFICANT CHANGE UP (ref 7–23)
CALCIUM SERPL-MCNC: 8.7 MG/DL — SIGNIFICANT CHANGE UP (ref 8.4–10.5)
CHLORIDE SERPL-SCNC: 102 MMOL/L — SIGNIFICANT CHANGE UP (ref 96–108)
CK SERPL-CCNC: CRITICAL HIGH U/L (ref 30–200)
CK SERPL-CCNC: CRITICAL HIGH U/L (ref 30–200)
CO2 SERPL-SCNC: 24 MMOL/L — SIGNIFICANT CHANGE UP (ref 22–31)
CREAT SERPL-MCNC: 0.92 MG/DL — SIGNIFICANT CHANGE UP (ref 0.5–1.3)
EGFR: 100 ML/MIN/1.73M2 — SIGNIFICANT CHANGE UP
GLUCOSE SERPL-MCNC: 136 MG/DL — HIGH (ref 70–99)
HCT VFR BLD CALC: 30 % — LOW (ref 39–50)
HGB BLD-MCNC: 9.8 G/DL — LOW (ref 13–17)
MAGNESIUM SERPL-MCNC: 1.8 MG/DL — SIGNIFICANT CHANGE UP (ref 1.6–2.6)
MCHC RBC-ENTMCNC: 26.7 PG — LOW (ref 27–34)
MCHC RBC-ENTMCNC: 32.7 GM/DL — SIGNIFICANT CHANGE UP (ref 32–36)
MCV RBC AUTO: 81.7 FL — SIGNIFICANT CHANGE UP (ref 80–100)
NRBC # BLD: 0 /100 WBCS — SIGNIFICANT CHANGE UP (ref 0–0)
PHOSPHATE SERPL-MCNC: 3.5 MG/DL — SIGNIFICANT CHANGE UP (ref 2.5–4.5)
PHOSPHATE SERPL-MCNC: 3.8 MG/DL — SIGNIFICANT CHANGE UP (ref 2.5–4.5)
PLATELET # BLD AUTO: 492 K/UL — HIGH (ref 150–400)
POTASSIUM SERPL-MCNC: 3.8 MMOL/L — SIGNIFICANT CHANGE UP (ref 3.5–5.3)
POTASSIUM SERPL-SCNC: 3.8 MMOL/L — SIGNIFICANT CHANGE UP (ref 3.5–5.3)
PROT SERPL-MCNC: 8.5 G/DL — HIGH (ref 6–8.3)
RBC # BLD: 3.67 M/UL — LOW (ref 4.2–5.8)
RBC # FLD: 14.8 % — HIGH (ref 10.3–14.5)
SODIUM SERPL-SCNC: 136 MMOL/L — SIGNIFICANT CHANGE UP (ref 135–145)
VANCOMYCIN TROUGH SERPL-MCNC: 7.7 UG/ML — LOW (ref 10–20)
WBC # BLD: 6.84 K/UL — SIGNIFICANT CHANGE UP (ref 3.8–10.5)
WBC # FLD AUTO: 6.84 K/UL — SIGNIFICANT CHANGE UP (ref 3.8–10.5)

## 2023-01-03 PROCEDURE — 93971 EXTREMITY STUDY: CPT | Mod: 26,LT

## 2023-01-03 PROCEDURE — 99232 SBSQ HOSP IP/OBS MODERATE 35: CPT

## 2023-01-03 PROCEDURE — 99233 SBSQ HOSP IP/OBS HIGH 50: CPT | Mod: GC

## 2023-01-03 RX ORDER — SODIUM CHLORIDE 9 MG/ML
1000 INJECTION INTRAMUSCULAR; INTRAVENOUS; SUBCUTANEOUS
Refills: 0 | Status: DISCONTINUED | OUTPATIENT
Start: 2023-01-03 | End: 2023-01-05

## 2023-01-03 RX ORDER — LINEZOLID 600 MG/300ML
600 INJECTION, SOLUTION INTRAVENOUS EVERY 12 HOURS
Refills: 0 | Status: DISCONTINUED | OUTPATIENT
Start: 2023-01-03 | End: 2023-01-03

## 2023-01-03 RX ORDER — LINEZOLID 600 MG/300ML
600 INJECTION, SOLUTION INTRAVENOUS ONCE
Refills: 0 | Status: COMPLETED | OUTPATIENT
Start: 2023-01-03 | End: 2023-01-03

## 2023-01-03 RX ORDER — LIDOCAINE 4 G/100G
1 CREAM TOPICAL DAILY
Refills: 0 | Status: DISCONTINUED | OUTPATIENT
Start: 2023-01-03 | End: 2023-01-05

## 2023-01-03 RX ORDER — MAGNESIUM OXIDE 400 MG ORAL TABLET 241.3 MG
400 TABLET ORAL ONCE
Refills: 0 | Status: COMPLETED | OUTPATIENT
Start: 2023-01-03 | End: 2023-01-03

## 2023-01-03 RX ORDER — ENOXAPARIN SODIUM 100 MG/ML
40 INJECTION SUBCUTANEOUS EVERY 12 HOURS
Refills: 0 | Status: DISCONTINUED | OUTPATIENT
Start: 2023-01-03 | End: 2023-01-05

## 2023-01-03 RX ORDER — LINEZOLID 600 MG/300ML
INJECTION, SOLUTION INTRAVENOUS
Refills: 0 | Status: DISCONTINUED | OUTPATIENT
Start: 2023-01-03 | End: 2023-01-04

## 2023-01-03 RX ORDER — CHLORHEXIDINE GLUCONATE 213 G/1000ML
1 SOLUTION TOPICAL DAILY
Refills: 0 | Status: DISCONTINUED | OUTPATIENT
Start: 2023-01-03 | End: 2023-01-05

## 2023-01-03 RX ORDER — LINEZOLID 600 MG/300ML
600 INJECTION, SOLUTION INTRAVENOUS EVERY 12 HOURS
Refills: 0 | Status: DISCONTINUED | OUTPATIENT
Start: 2023-01-04 | End: 2023-01-04

## 2023-01-03 RX ADMIN — LINEZOLID 300 MILLIGRAM(S): 600 INJECTION, SOLUTION INTRAVENOUS at 21:58

## 2023-01-03 RX ADMIN — OXYCODONE HYDROCHLORIDE 5 MILLIGRAM(S): 5 TABLET ORAL at 05:14

## 2023-01-03 RX ADMIN — OXYCODONE HYDROCHLORIDE 5 MILLIGRAM(S): 5 TABLET ORAL at 17:29

## 2023-01-03 RX ADMIN — Medication 50 MILLIGRAM(S): at 06:13

## 2023-01-03 RX ADMIN — ENOXAPARIN SODIUM 40 MILLIGRAM(S): 100 INJECTION SUBCUTANEOUS at 19:15

## 2023-01-03 RX ADMIN — OXYCODONE HYDROCHLORIDE 5 MILLIGRAM(S): 5 TABLET ORAL at 11:06

## 2023-01-03 RX ADMIN — OXYCODONE HYDROCHLORIDE 5 MILLIGRAM(S): 5 TABLET ORAL at 06:00

## 2023-01-03 RX ADMIN — Medication 60 MILLIGRAM(S): at 06:13

## 2023-01-03 RX ADMIN — OXYCODONE HYDROCHLORIDE 5 MILLIGRAM(S): 5 TABLET ORAL at 18:29

## 2023-01-03 RX ADMIN — CHLORHEXIDINE GLUCONATE 1 APPLICATION(S): 213 SOLUTION TOPICAL at 12:32

## 2023-01-03 RX ADMIN — LIDOCAINE 1 PATCH: 4 CREAM TOPICAL at 17:28

## 2023-01-03 RX ADMIN — SODIUM CHLORIDE 200 MILLILITER(S): 9 INJECTION INTRAMUSCULAR; INTRAVENOUS; SUBCUTANEOUS at 17:29

## 2023-01-03 RX ADMIN — SODIUM CHLORIDE 200 MILLILITER(S): 9 INJECTION INTRAMUSCULAR; INTRAVENOUS; SUBCUTANEOUS at 12:34

## 2023-01-03 RX ADMIN — SODIUM CHLORIDE 200 MILLILITER(S): 9 INJECTION INTRAMUSCULAR; INTRAVENOUS; SUBCUTANEOUS at 02:42

## 2023-01-03 RX ADMIN — SODIUM CHLORIDE 200 MILLILITER(S): 9 INJECTION INTRAMUSCULAR; INTRAVENOUS; SUBCUTANEOUS at 06:57

## 2023-01-03 RX ADMIN — OXYCODONE HYDROCHLORIDE 5 MILLIGRAM(S): 5 TABLET ORAL at 10:06

## 2023-01-03 RX ADMIN — MAGNESIUM OXIDE 400 MG ORAL TABLET 400 MILLIGRAM(S): 241.3 TABLET ORAL at 12:31

## 2023-01-03 RX ADMIN — Medication 50 MILLIGRAM(S): at 18:15

## 2023-01-03 NOTE — PROGRESS NOTE ADULT - PROBLEM SELECTOR PLAN 3
Hb 10.7 on admission, baseline 9-10s. No active signs of bleeding    Plan:  - Transfuse for Hb <7  - Maintain active type and screen  - f/u iron profile Hb 10.7 on admission, baseline 9-10s. No active signs of bleeding. -Iron dlow, TIBC low; picture of anemia of chronic disease    Plan:  - Transfuse for Hb <7  - Maintain active type and screen

## 2023-01-03 NOTE — PROGRESS NOTE ADULT - PROBLEM SELECTOR PLAN 2
Admitted by ortho service for septic arthritis of left shoulder 12/17 with MRSA culture and discharged on daptomycin. Afebrile and normal WBC. No erythema or tenderness at site    Plan:  - ortho following, will follow up recs  - f/u blood culture; NGTD day 5  - Pain management with tramadol 25mg q6h for moderate and oxycodone 5mg q4h for severe pain; stopped toradol today  - abx as above

## 2023-01-03 NOTE — PROGRESS NOTE ADULT - PROBLEM SELECTOR PLAN 6
On home nifedipine 60mg QD and lopressor 50mg BID    Plan:  - c/w nifedipine 60mg QD and lopressor 50mg BID      #Elevated protein gap  Protein 8.8, alb 3.1. Elevated last admission as well.  - f/u HIV On home nifedipine 60mg QD and lopressor 50mg BID    Plan:  - c/w nifedipine 60mg QD and lopressor 50mg BID      #Elevated protein gap  Protein 8.8, alb 3.1. Elevated last admission as well.  HIV nonreactive  continue to monitor

## 2023-01-03 NOTE — PROGRESS NOTE ADULT - PROBLEM SELECTOR PLAN 1
Admitted for septic arthritis of left shoulder 12/17 with MRSA culture and discharged on daptomycin now with labs >20k CK and patient complaining of muscle soreness/pain with some weakness. Admits to not drinking much fluids. UA clear but with blood and few granular and hyaline casts and normal kidney function. s/p 1L NS in ED. Daptomycyin was d/c'ed. This AM CK was downtrending. Patient currently urinating 10 times per day. Patient with tingling in LUE. May be indicative of compartment syndrome- LUE with edema with no erythema, sensation intact and 5/5 MSK    Plan:  - f/u CK this Afternoon; BID CK and phos checks  - ID consulted: c/w Vancomycin 2g IV q12, check level before 4th dose 1/3  - c/w NS 200cc/hr, titrate with goal urine output of 200cc/hr.   - Monitor strict intake and output  - Monitor Cr  - Encourage PO intake  - F/up with orthopedics iso possible compartment syndrome Admitted for septic arthritis of left shoulder 12/17 with MRSA culture and discharged on daptomycin now with labs >20k CK and patient complaining of muscle soreness/pain with some weakness. Admits to not drinking much fluids. UA clear but with blood and few granular and hyaline casts and normal kidney function. s/p 1L NS in ED. Daptomycyin was d/c'ed. This AM CK was downtrending. Patient currently urinating 10 times per day. Patient with tingling in LUE. May be indicative of compartment syndrome- LUE with edema with no erythema, sensation intact and 5/5 MSK    Plan:  - f/u CK this Afternoon; BID CK and phos checks  - ID consulted: c/w Vancomycin 2g IV q12, check level before 4th dose 1/3  - c/w NS 200cc/hr, titrate with goal urine output of 200cc/hr.   - Monitor strict intake and output  - Monitor Cr  - Encourage PO intake- F/up with orthopedics iso possible compartment syndrome (swollen, pain w passive movement, paresthesias)- has pulse and color

## 2023-01-03 NOTE — PROGRESS NOTE ADULT - ATTENDING COMMENTS
52 year old male with a PMHx of left shoulder septic arthritis (s/p washout 12/17 with Cx growing MRSA, on Daptomycin via RUE PICC), HTN, SVT and obesity who presented with elevated CK and myalgias while on Daptomycin.      #Rhabdomyolysis  -in setting of Daptomycin with poor PO intake  -continue with NS at 200cc/hr  -trend CK BID for now   -hold daptomycin    #Septic Arthritis  -ortho consulted, no acute intervention, will continue to follow    -ID consult for further antibiotic recommendations     #Anemia  -follow up iron studies/ferritin      #Transaminitis  -in setting of rhabdo, trend LFTs  -RUQ US ordered, can cancel as transaminitis in setting of rhabdo    Rest of plan as per resident note.
I personally examined Mr. Francois and discussed management with medical team. I agree with assessment except as below.    Patient reports feeling improved, myalgias resolved. Denies SOB, no chest pain, reports good UOP. He reports worsening left shoulder pain since yesterday, denies fevers or chills.    Exam as above  Labs  WBC 6.84  H/H 9.8/30 MCV 81.7  plt 492  CK 41783 Phos 3.8  Creat 0.92    Continue on IVF at current rate, monitor CK q12h, Phos  Monitor creatinine, hold nephrotoxics  Trend LFTs  Continue ATB per ID, monitor Vanc through  Patient with worsening left shoulder and arm pain, no tenderness to palpation. Good pulses distally, Get Ortho follow-up
52-year-old male with a PMHx of left shoulder septic arthritis (s/p washout 12/17 with Cx growing MRSA, on Daptomycin via RUE PICC), HTN, SVT and obesity who presented with rhabdomyolysis 2/2 Daptomycin.      #Rhabdomyolysis   -in setting of Daptomycin   -continue with NS at 200cc/hr, goal urine output 200-300cc/hr, titrate as needed    -trend CK BID for now    -kidney function stable with adequate urine output   -hold daptomycin indefinitely     #Septic Arthritis   -ortho consulted, no acute intervention, will continue to follow     -ID consult, start vancomycin 2g BID      #Anemia   -iron studies suggestive of ACD      #Transaminitis   -in setting of rhabdo, trend LFTs   -RUQ US ordered, can cancel as transaminitis in setting of rhabdo     Rest of plan as per resident note.           Rest of plan as per resident note.

## 2023-01-03 NOTE — PROGRESS NOTE ADULT - PROBLEM SELECTOR PLAN 9
Fluids: s/p 1L NS. NS  Electrolytes: Replete to keep K>4 and Mg>2  Nutrition: DASH  DVT ppx: SCDs  GI ppx: None  Code: Full code  Dispo: [facility] Fluids: 200 cc/hr  Electrolytes: Replete to keep K>4 and Mg>2  Nutrition: DASH  DVT ppx: SCDs  GI ppx: None  Code: Full code  Dispo: [facility]

## 2023-01-04 ENCOUNTER — TRANSCRIPTION ENCOUNTER (OUTPATIENT)
Age: 53
End: 2023-01-04

## 2023-01-04 LAB
ALBUMIN SERPL ELPH-MCNC: 3 G/DL — LOW (ref 3.3–5)
ALP SERPL-CCNC: 144 U/L — HIGH (ref 40–120)
ALT FLD-CCNC: 137 U/L — HIGH (ref 10–45)
ANION GAP SERPL CALC-SCNC: 6 MMOL/L — SIGNIFICANT CHANGE UP (ref 5–17)
AST SERPL-CCNC: 137 U/L — HIGH (ref 10–40)
BILIRUB SERPL-MCNC: 0.3 MG/DL — SIGNIFICANT CHANGE UP (ref 0.2–1.2)
BUN SERPL-MCNC: 9 MG/DL — SIGNIFICANT CHANGE UP (ref 7–23)
CALCIUM SERPL-MCNC: 8.5 MG/DL — SIGNIFICANT CHANGE UP (ref 8.4–10.5)
CHLORIDE SERPL-SCNC: 104 MMOL/L — SIGNIFICANT CHANGE UP (ref 96–108)
CK SERPL-CCNC: 9697 U/L — HIGH (ref 30–200)
CK SERPL-CCNC: CRITICAL HIGH U/L (ref 30–200)
CO2 SERPL-SCNC: 27 MMOL/L — SIGNIFICANT CHANGE UP (ref 22–31)
CREAT SERPL-MCNC: 1.16 MG/DL — SIGNIFICANT CHANGE UP (ref 0.5–1.3)
EGFR: 76 ML/MIN/1.73M2 — SIGNIFICANT CHANGE UP
GLUCOSE BLDC GLUCOMTR-MCNC: 155 MG/DL — HIGH (ref 70–99)
GLUCOSE SERPL-MCNC: 111 MG/DL — HIGH (ref 70–99)
HCT VFR BLD CALC: 31.4 % — LOW (ref 39–50)
HGB BLD-MCNC: 9.9 G/DL — LOW (ref 13–17)
MAGNESIUM SERPL-MCNC: 1.7 MG/DL — SIGNIFICANT CHANGE UP (ref 1.6–2.6)
MCHC RBC-ENTMCNC: 26.2 PG — LOW (ref 27–34)
MCHC RBC-ENTMCNC: 31.5 GM/DL — LOW (ref 32–36)
MCV RBC AUTO: 83.1 FL — SIGNIFICANT CHANGE UP (ref 80–100)
NRBC # BLD: 0 /100 WBCS — SIGNIFICANT CHANGE UP (ref 0–0)
PHOSPHATE SERPL-MCNC: 3.3 MG/DL — SIGNIFICANT CHANGE UP (ref 2.5–4.5)
PHOSPHATE SERPL-MCNC: 4 MG/DL — SIGNIFICANT CHANGE UP (ref 2.5–4.5)
PLATELET # BLD AUTO: 506 K/UL — HIGH (ref 150–400)
POTASSIUM SERPL-MCNC: 4 MMOL/L — SIGNIFICANT CHANGE UP (ref 3.5–5.3)
POTASSIUM SERPL-SCNC: 4 MMOL/L — SIGNIFICANT CHANGE UP (ref 3.5–5.3)
PROT SERPL-MCNC: 8.5 G/DL — HIGH (ref 6–8.3)
RBC # BLD: 3.78 M/UL — LOW (ref 4.2–5.8)
RBC # FLD: 14.7 % — HIGH (ref 10.3–14.5)
SODIUM SERPL-SCNC: 137 MMOL/L — SIGNIFICANT CHANGE UP (ref 135–145)
WBC # BLD: 7.2 K/UL — SIGNIFICANT CHANGE UP (ref 3.8–10.5)
WBC # FLD AUTO: 7.2 K/UL — SIGNIFICANT CHANGE UP (ref 3.8–10.5)

## 2023-01-04 PROCEDURE — 99233 SBSQ HOSP IP/OBS HIGH 50: CPT | Mod: GC

## 2023-01-04 RX ORDER — POLYETHYLENE GLYCOL 3350 17 G/17G
17 POWDER, FOR SOLUTION ORAL ONCE
Refills: 0 | Status: COMPLETED | OUTPATIENT
Start: 2023-01-04 | End: 2023-01-04

## 2023-01-04 RX ORDER — NIFEDIPINE 30 MG
1 TABLET, EXTENDED RELEASE 24 HR ORAL
Qty: 0 | Refills: 0 | DISCHARGE

## 2023-01-04 RX ORDER — METOPROLOL TARTRATE 50 MG
1 TABLET ORAL
Qty: 0 | Refills: 0 | DISCHARGE

## 2023-01-04 RX ORDER — OXYCODONE AND ACETAMINOPHEN 5; 325 MG/1; MG/1
1 TABLET ORAL EVERY 6 HOURS
Refills: 0 | Status: DISCONTINUED | OUTPATIENT
Start: 2023-01-04 | End: 2023-01-04

## 2023-01-04 RX ORDER — SENNA PLUS 8.6 MG/1
2 TABLET ORAL AT BEDTIME
Refills: 0 | Status: DISCONTINUED | OUTPATIENT
Start: 2023-01-04 | End: 2023-01-05

## 2023-01-04 RX ORDER — LINEZOLID 600 MG/300ML
1 INJECTION, SOLUTION INTRAVENOUS
Qty: 20 | Refills: 0
Start: 2023-01-04 | End: 2023-01-13

## 2023-01-04 RX ORDER — DAPTOMYCIN 500 MG/10ML
1 INJECTION, POWDER, LYOPHILIZED, FOR SOLUTION INTRAVENOUS
Qty: 0 | Refills: 0 | DISCHARGE

## 2023-01-04 RX ORDER — LINEZOLID 600 MG/300ML
600 INJECTION, SOLUTION INTRAVENOUS EVERY 12 HOURS
Refills: 0 | Status: DISCONTINUED | OUTPATIENT
Start: 2023-01-04 | End: 2023-01-05

## 2023-01-04 RX ORDER — MAGNESIUM OXIDE 400 MG ORAL TABLET 241.3 MG
400 TABLET ORAL ONCE
Refills: 0 | Status: COMPLETED | OUTPATIENT
Start: 2023-01-04 | End: 2023-01-04

## 2023-01-04 RX ADMIN — OXYCODONE HYDROCHLORIDE 5 MILLIGRAM(S): 5 TABLET ORAL at 10:01

## 2023-01-04 RX ADMIN — Medication 60 MILLIGRAM(S): at 05:47

## 2023-01-04 RX ADMIN — POLYETHYLENE GLYCOL 3350 17 GRAM(S): 17 POWDER, FOR SOLUTION ORAL at 05:48

## 2023-01-04 RX ADMIN — LIDOCAINE 1 PATCH: 4 CREAM TOPICAL at 22:01

## 2023-01-04 RX ADMIN — OXYCODONE HYDROCHLORIDE 5 MILLIGRAM(S): 5 TABLET ORAL at 06:54

## 2023-01-04 RX ADMIN — LINEZOLID 300 MILLIGRAM(S): 600 INJECTION, SOLUTION INTRAVENOUS at 08:46

## 2023-01-04 RX ADMIN — MAGNESIUM OXIDE 400 MG ORAL TABLET 400 MILLIGRAM(S): 241.3 TABLET ORAL at 15:48

## 2023-01-04 RX ADMIN — ENOXAPARIN SODIUM 40 MILLIGRAM(S): 100 INJECTION SUBCUTANEOUS at 05:47

## 2023-01-04 RX ADMIN — ENOXAPARIN SODIUM 40 MILLIGRAM(S): 100 INJECTION SUBCUTANEOUS at 17:51

## 2023-01-04 RX ADMIN — OXYCODONE HYDROCHLORIDE 5 MILLIGRAM(S): 5 TABLET ORAL at 17:46

## 2023-01-04 RX ADMIN — LINEZOLID 600 MILLIGRAM(S): 600 INJECTION, SOLUTION INTRAVENOUS at 20:28

## 2023-01-04 RX ADMIN — LIDOCAINE 1 PATCH: 4 CREAM TOPICAL at 06:24

## 2023-01-04 RX ADMIN — OXYCODONE HYDROCHLORIDE 5 MILLIGRAM(S): 5 TABLET ORAL at 18:00

## 2023-01-04 RX ADMIN — Medication 50 MILLIGRAM(S): at 17:46

## 2023-01-04 RX ADMIN — CHLORHEXIDINE GLUCONATE 1 APPLICATION(S): 213 SOLUTION TOPICAL at 15:48

## 2023-01-04 RX ADMIN — Medication 50 MILLIGRAM(S): at 05:47

## 2023-01-04 RX ADMIN — OXYCODONE HYDROCHLORIDE 5 MILLIGRAM(S): 5 TABLET ORAL at 05:54

## 2023-01-04 RX ADMIN — SENNA PLUS 2 TABLET(S): 8.6 TABLET ORAL at 21:41

## 2023-01-04 RX ADMIN — OXYCODONE HYDROCHLORIDE 5 MILLIGRAM(S): 5 TABLET ORAL at 11:01

## 2023-01-04 NOTE — PROGRESS NOTE ADULT - PROBLEM SELECTOR PLAN 2
L shoulder septic arthritis; s/p washout on 12/17; OR culture growing MRSA; Ortho and ID following; cont. linezolid through 1/14, per ID recs; pain control; can d/c PICC line; monitor LUE swelling, elevate arm, doppler U/S negative for DVT

## 2023-01-04 NOTE — DISCHARGE NOTE PROVIDER - NSDCMRMEDTOKEN_GEN_ALL_CORE_FT
linezolid 600 mg oral tablet: 1 tab(s) orally every 12 hours   linezolid 600 mg oral tablet: 1 tab(s) orally every 12 hours  oxyCODONE 5 mg oral tablet: 1 tab(s) orally every 6 hours, As Needed -for severe pain MDD:20mg

## 2023-01-04 NOTE — PROGRESS NOTE ADULT - PROBLEM SELECTOR PLAN 7
Platelet 646 on admission. Platelet 400-700s last admission. Likely reactive    Plan:  - Trend platelets

## 2023-01-04 NOTE — DIETITIAN INITIAL EVALUATION ADULT - OTHER CALCULATIONS
lbs. %%. IBW used to calculate energy needs due to pt's current body weight exceeding 120% of IBW. Needs adjusted for age and wt, s/p L shoulder arthroscopy, rhabdomyolysis not examined

## 2023-01-04 NOTE — DISCHARGE NOTE PROVIDER - HOSPITAL COURSE
#Discharge: do not delete  52M PMHx septic arthritis (s/p washout 12/17), SVT, obesity presents to ED due to abnormal outpatient labs >20K CK while on daptomycin, admitted for management of rhabdomyolysis.    Hospital course (by problem):     #Rhabdomyolysis  Patient was admitted for septic arthritis of left shoulder 12/17 with MRSA culture and discharged on daptomycin now with labs c/w rhabdo and patient complaining of muscle soreness/pain with some weakness. Admits to not drinking much fluids. Daptomycin was stopped and patient was treated with aggressive fluids. CK labs downtrended during hospital stay. Phosphorus, kidney, and liver function were closely monitored.   CK downtrending.  - follow up with primary care physician within 1 week of discharge to monitor labs  - continue with oral hydration    # Arthritis, septic, shoulder   Admitted by ortho service for septic arthritis of left shoulder 12/17 with MRSA culture and discharged on daptomycin. Afebrile and normal WBC. No erythema or tenderness at site. PICC line was taken out and daptomycin was switched to vancoimycin. Patient was sent home on Linezolid, as per infectious disease recs. Blood cultures showed no growth to date.   - continue linezolid 600 mg PO BID until 1/14  - follow up with outpatient ortho    #Transaminitis    and  on admission. Had normal LFTs earlier this month during last admission. No abd pain on exam, no alcohol use and no recent hepatotoxic medication use. Suspect 2/2 rhabdo iso muscle breakdown. Labs downtrended during hospital stay.   - follow up with primary care physician within 1 week of discharge to monitor labs    Patient was discharged to: (home/BRETT/acute rehab/hospice, etc, and with what services – home health PT/RN? Home O2?)    New medications: linezolid 600 mg BID until 1/14  Changes to old medications: none  Medications that were stopped: Daptomycin    Items to follow up as outpatient:  Ortho: septic arthritis  PCP: rhabdomyolysis labs within 1 week of discharge    Physical exam at the time of discharge:       #Discharge: do not delete  52M PMHx septic arthritis (s/p washout 12/17), SVT, obesity presents to ED due to abnormal outpatient labs >20K CK while on daptomycin, admitted for management of rhabdomyolysis.    Hospital course (by problem):     #Rhabdomyolysis  Patient was admitted for septic arthritis of left shoulder 12/17 with MRSA culture and discharged on daptomycin now with labs c/w rhabdo and patient complaining of muscle soreness/pain with some weakness. Admits to not drinking much fluids. Daptomycin was stopped and patient was treated with aggressive fluids. CK labs downtrended during hospital stay. Phosphorus, kidney, and liver function were closely monitored.   CK downtrending.   - follow up with primary care physician within 1 week of discharge to monitor labs  - continue with oral hydration    # Arthritis, septic, shoulder   Admitted by ortho service for septic arthritis of left shoulder 12/17 with MRSA culture and discharged on daptomycin. Afebrile and normal WBC. No erythema or tenderness at site. PICC line was taken out and daptomycin was switched to vancoimycin. Patient was sent home on Linezolid, as per infectious disease recs. Blood cultures showed no growth to date.   - continue linezolid 600 mg PO BID until 1/14  - follow up with outpatient ortho    #Transaminitis    and  on admission. Had normal LFTs earlier this month during last admission. No abd pain on exam, no alcohol use and no recent hepatotoxic medication use. Suspect 2/2 rhabdo iso muscle breakdown. Labs downtrended during hospital stay.   - follow up with primary care physician within 1 week of discharge to monitor labs    Patient was discharged to: (home/BRETT/acute rehab/hospice, etc, and with what services – home health PT/RN? Home O2?)    New medications: linezolid 600 mg BID until 1/14  Changes to old medications: none  Medications that were stopped: Daptomycin    Items to follow up as outpatient:  Ortho: septic arthritis  PCP: rhabdomyolysis labs within 1 week of discharge    Physical exam at the time of discharge:       #Discharge: do not delete  52M PMHx septic arthritis (s/p washout 12/17), SVT, obesity presents to ED due to abnormal outpatient labs >20K CK while on daptomycin, admitted for management of rhabdomyolysis.    Hospital course (by problem):     #Rhabdomyolysis  Patient was admitted for septic arthritis of left shoulder 12/17 with MRSA culture and discharged on daptomycin now with labs c/w rhabdo and patient complaining of muscle soreness/pain with some weakness. Admits to not drinking much fluids. Daptomycin was stopped and patient was treated with aggressive fluids. CK labs downtrended during hospital stay. Phosphorus, kidney, and liver function were closely monitored.   CK downtrending.   - follow up with primary care physician within 1 week of discharge to monitor labs  - continue with oral hydration  - monitor left upper extremity swelling    # Arthritis, septic, shoulder   Admitted by ortho service for septic arthritis of left shoulder 12/17 with MRSA culture and discharged on daptomycin. Afebrile and normal WBC. No erythema or tenderness at site. PICC line was taken out and daptomycin was switched to vancoimycin. Patient was sent home on Linezolid, as per infectious disease recs. Blood cultures showed no growth to date.   - continue linezolid 600 mg PO BID until 1/14  - follow up with outpatient ortho  - follow up with infectious disease    #Transaminitis    and  on admission. Had normal LFTs earlier this month during last admission. No abd pain on exam, no alcohol use and no recent hepatotoxic medication use. Suspect 2/2 rhabdo iso muscle breakdown. Labs downtrended during hospital stay.   - follow up with primary care physician within 1 week of discharge to monitor labs    Patient was discharged to: (home/BRETT/acute rehab/hospice, etc, and with what services – home health PT/RN? Home O2?)    New medications: linezolid 600 mg BID until 1/14, oxycodone 5 mg for pain   Changes to old medications: none  Medications that were stopped: Daptomycin    Items to follow up as outpatient:  Ortho: septic arthritis  PCP: rhabdomyolysis labs within 1 week of discharge  ID: septic arthritis management     Physical exam at the time of discharge:

## 2023-01-04 NOTE — PROGRESS NOTE ADULT - PROBLEM SELECTOR PLAN 8
History of SVT last admission which improved to sinus tachycardia after vagal maneuver, card was following and was discharged on lopressor 50mg BID. EKG sinus tachycardia this admission    Plan:  - c/w lopressor 50mg BID

## 2023-01-04 NOTE — PROGRESS NOTE ADULT - PROBLEM SELECTOR PLAN 9
Fluids: 200 cc/hr  Electrolytes: Replete to keep K>4 and Mg>2  Nutrition: DASH  DVT ppx: SCDs  GI ppx: None  Code: Full code  Dispo: [facility]

## 2023-01-04 NOTE — DISCHARGE NOTE PROVIDER - NSDCCPCAREPLAN_GEN_ALL_CORE_FT
PRINCIPAL DISCHARGE DIAGNOSIS  Diagnosis: Rhabdomyolysis  Assessment and Plan of Treatment: Rhabdomyolysis is a condition that happens when muscle cells break down and release substances into the blood that can damage the kidneys. This condition happens because of damage to the muscles that move bones (skeletal muscles). When the skeletal muscles are damaged, substances inside the muscle cells go into the blood. One of these substances is a protein called myoglobin. It was caused by an adverse reaction to daptomycin, an antibiotic you were prescribed for septic arthritis.   Large amounts of myoglobin can cause kidney damage or kidney failure. Other substances that are released by muscle cells may upset the balance of the minerals (electrolytes) in the blood. This imbalance causes the blood to have too much acid (acidosis).  Complications from this condition include:  •A heart rhythm that is not normal (arrhythmia), Seizures., Not urinating enough because of kidney failure., Very low blood pressure (hypotension) and shock. Signs of shock include dizziness, blurry vision, and clammy skin, Bleeding that is hard to stop or control., Compartment syndrome. This is when there is too much pressure in the space surrounding muscles  Please continue to monitor for symptoms and continue to stay hydrated.   Please follow up with your outpatient provider within 2 weeks of discharge to order rhabdomyolysis labs.         SECONDARY DISCHARGE DIAGNOSES  Diagnosis: Septic arthritis  Assessment and Plan of Treatment: Septic arthritis is inflammation of a joint that results from an infection. The infection occurs when bacteria or other germs get inside a joint. The knee and hip joints are most often affected, but other joints may also become infected. Usually, just one joint is affected. Joint infections need to be treated quickly to prevent damage to the joint, and to prevent the infection from spreading to other areas of your body.  You were originally given daptomycin to treat your condition, however you had an adverse reaction. Pleasse tell future providers of your allergy. Please take antibiotic Linezolid 600 mg every 12 hours unmtil 1/14 in the PM.     PRINCIPAL DISCHARGE DIAGNOSIS  Diagnosis: Rhabdomyolysis  Assessment and Plan of Treatment: Rhabdomyolysis is a condition that happens when muscle cells break down and release substances into the blood that can damage the kidneys. This condition happens because of damage to the muscles that move bones (skeletal muscles). When the skeletal muscles are damaged, substances inside the muscle cells go into the blood. One of these substances is a protein called myoglobin. It was caused by an adverse reaction to daptomycin, an antibiotic you were prescribed for septic arthritis.   Large amounts of myoglobin can cause kidney damage or kidney failure. Other substances that are released by muscle cells may upset the balance of the minerals (electrolytes) in the blood. This imbalance causes the blood to have too much acid (acidosis).  Complications from this condition include:  •A heart rhythm that is not normal (arrhythmia), Seizures., Not urinating enough because of kidney failure., Very low blood pressure (hypotension) and shock. Signs of shock include dizziness, blurry vision, and clammy skin, Bleeding that is hard to stop or control., Compartment syndrome. This is when there is too much pressure in the space surrounding muscles  Please continue to monitor for symptoms and continue to stay hydrated.   Please follow up with your outpatient provider within 2 weeks of discharge to order rhabdomyolysis labs.   PLEASE monitor your L arm for symptoms of compartment syndrome (pain, paresthsias, pulsenellness, inabilty to move arm). You were discharged with a swollen arm with imaging showing no signs of thrombosis, however you can develop a syndrome called compartment syndrome, which would need immediate medical attention.         SECONDARY DISCHARGE DIAGNOSES  Diagnosis: Septic arthritis  Assessment and Plan of Treatment: Septic arthritis is inflammation of a joint that results from an infection. The infection occurs when bacteria or other germs get inside a joint. The knee and hip joints are most often affected, but other joints may also become infected. Usually, just one joint is affected. Joint infections need to be treated quickly to prevent damage to the joint, and to prevent the infection from spreading to other areas of your body.  You were originally given daptomycin to treat your condition, however you had an adverse reaction. Pleasse tell future providers of your allergy. Please take antibiotic Linezolid 600 mg every 12 hours until 1/14 in the PM.     PRINCIPAL DISCHARGE DIAGNOSIS  Diagnosis: Rhabdomyolysis  Assessment and Plan of Treatment: Rhabdomyolysis is a condition that happens when muscle cells break down and release substances into the blood that can damage the kidneys. This condition happens because of damage to the muscles that move bones (skeletal muscles). When the skeletal muscles are damaged, substances inside the muscle cells go into the blood. One of these substances is a protein called myoglobin. It was caused by an adverse reaction to daptomycin, an antibiotic you were prescribed for septic arthritis.   Large amounts of myoglobin can cause kidney damage or kidney failure. Other substances that are released by muscle cells may upset the balance of the minerals (electrolytes) in the blood. This imbalance causes the blood to have too much acid (acidosis).  Complications from this condition include:  •A heart rhythm that is not normal (arrhythmia), Seizures., Not urinating enough because of kidney failure., Very low blood pressure (hypotension) and shock. Signs of shock include dizziness, blurry vision, and clammy skin, Bleeding that is hard to stop or control., Compartment syndrome. This is when there is too much pressure in the space surrounding muscles  Please continue to monitor for symptoms and continue to stay hydrated.   PLEASE monitor your L arm for symptoms of compartment syndrome (pain, paresthsias, pulsenellness, inabilty to move arm). You were discharged with a swollen arm with imaging showing no signs of thrombosis, however you can develop a syndrome called compartment syndrome, which would need immediate medical attention.   Please follow up with your outpatient provider within 2 weeks of discharge to order rhabdomyolysis labs.         SECONDARY DISCHARGE DIAGNOSES  Diagnosis: Septic arthritis  Assessment and Plan of Treatment: Septic arthritis is inflammation of a joint that results from an infection. The infection occurs when bacteria or other germs get inside a joint. The knee and hip joints are most often affected, but other joints may also become infected. Usually, just one joint is affected. Joint infections need to be treated quickly to prevent damage to the joint, and to prevent the infection from spreading to other areas of your body.  You were originally given daptomycin to treat your condition, however you had an adverse reaction. Pleasse tell future providers of your allergy. Please take antibiotic Linezolid 600 mg every 12 hours until 1/14 in the PM.  Please follow up with Infectious Disease. Office will reach out to make appointment (Dr. France)     PRINCIPAL DISCHARGE DIAGNOSIS  Diagnosis: Rhabdomyolysis  Assessment and Plan of Treatment: Rhabdomyolysis is a condition that happens when muscle cells break down and release substances into the blood that can damage the kidneys. This condition happens because of damage to the muscles that move bones (skeletal muscles). When the skeletal muscles are damaged, substances inside the muscle cells go into the blood. One of these substances is a protein called myoglobin. It was caused by an adverse reaction to daptomycin, an antibiotic you were prescribed for septic arthritis.   Large amounts of myoglobin can cause kidney damage or kidney failure. Other substances that are released by muscle cells may upset the balance of the minerals (electrolytes) in the blood. This imbalance causes the blood to have too much acid (acidosis).  Complications from this condition include:  •A heart rhythm that is not normal (arrhythmia), Seizures., Not urinating enough because of kidney failure., Very low blood pressure (hypotension) and shock. Signs of shock include dizziness, blurry vision, and clammy skin, Bleeding that is hard to stop or control., Compartment syndrome. This is when there is too much pressure in the space surrounding muscles  syndrome (pain, paresthsias, pulsenellness, inabilty to move arm). You were discharged with a swollen arm with imaging showing no signs of thrombosis, however you can develop a syndrome called compartment syndrome, which would need immediate medical attention.   Please follow up with your outpatient provider within 2 weeks of discharge to order rhabdomyolysis labs.   Please continue to monitor for symptoms and continue to stay hydrated. Please take oxy 5 mg as needed every 8 hrs for pain relief  PLEASE monitor your L arm for symptoms of compartment and elevate your arm to reduce swelling.      SECONDARY DISCHARGE DIAGNOSES  Diagnosis: Septic arthritis  Assessment and Plan of Treatment: Septic arthritis is inflammation of a joint that results from an infection. The infection occurs when bacteria or other germs get inside a joint. The knee and hip joints are most often affected, but other joints may also become infected. Usually, just one joint is affected. Joint infections need to be treated quickly to prevent damage to the joint, and to prevent the infection from spreading to other areas of your body.  You were originally given daptomycin to treat your condition, however you had an adverse reaction. Pleasse tell future providers of your allergy. Please take antibiotic Linezolid 600 mg every 12 hours until 1/14 in the PM.  Please follow up with Infectious Disease. Office will reach out to make appointment (Dr. France)     PRINCIPAL DISCHARGE DIAGNOSIS  Diagnosis: Rhabdomyolysis  Assessment and Plan of Treatment: Rhabdomyolysis is a condition that happens when muscle cells break down and release substances into the blood that can damage the kidneys. This condition happens because of damage to the muscles that move bones (skeletal muscles). When the skeletal muscles are damaged, substances inside the muscle cells go into the blood. One of these substances is a protein called myoglobin. It was caused by an adverse reaction to daptomycin, an antibiotic you were prescribed for septic arthritis.   Large amounts of myoglobin can cause kidney damage or kidney failure. Other substances that are released by muscle cells may upset the balance of the minerals (electrolytes) in the blood. This imbalance causes the blood to have too much acid (acidosis).  Complications from this condition include:  •A heart rhythm that is not normal (arrhythmia), Seizures., Not urinating enough because of kidney failure., Very low blood pressure (hypotension) and shock. Signs of shock include dizziness, blurry vision, and clammy skin, Bleeding that is hard to stop or control., Compartment syndrome. This is when there is too much pressure in the space surrounding muscles  syndrome (pain, paresthsias, pulsenellness, inabilty to move arm). You were discharged with a swollen arm with imaging showing no signs of thrombosis, however you can develop a syndrome called compartment syndrome, which would need immediate medical attention.   Please follow up with your outpatient provider within 2 weeks of discharge to order rhabdomyolysis labs.   Please continue to monitor for symptoms and continue to stay hydrated. Please take Miralax and senna (at bedtime) to ensure proper bowel movements.  PLEASE monitor your L arm for symptoms of compartment and elevate your arm to reduce swelling.      SECONDARY DISCHARGE DIAGNOSES  Diagnosis: Septic arthritis  Assessment and Plan of Treatment: Septic arthritis is inflammation of a joint that results from an infection. The infection occurs when bacteria or other germs get inside a joint. The knee and hip joints are most often affected, but other joints may also become infected. Usually, just one joint is affected. Joint infections need to be treated quickly to prevent damage to the joint, and to prevent the infection from spreading to other areas of your body.  You were originally given daptomycin to treat your condition, however you had an adverse reaction. Pleasse tell future providers of your allergy. Please take antibiotic Linezolid 600 mg every 12 hours until 1/14 in the PM.  Please follow up with Infectious Disease. Office will reach out to make appointment (Dr. France)

## 2023-01-04 NOTE — DIETITIAN INITIAL EVALUATION ADULT - OTHER INFO
52M PMHx septic arthritis (s/p washout 12/17), SVT, obesity presents to ED due to abnormal outpatient labs >20K CK while on daptomycin, admitted for management of rhabdomyolysis.    Pt seen resting in bed. Pt known to this RD from previous adm 1-2 weeks ago. Denies changes in wt or appetite changes since then. He reports appetite remains intake, eating well. No PO intake this morning yet as pt wanted to brush teeth first and had just woken up. Denies n/v/d, No BM since 12/21 per pt reported. On BM regimen. Endorses pain in shoulder but otherwise no other complaints. Continue to encourage PO to meet needs for healing. Prefers African foods. NKFA. Skin: hernan 20, surgical incision noted, no PU or edema. RD to continue to follow.

## 2023-01-04 NOTE — PROGRESS NOTE ADULT - PROBLEM SELECTOR PLAN 6
On home nifedipine 60mg QD and lopressor 50mg BID    Plan:  - c/w nifedipine 60mg QD and lopressor 50mg BID      #Elevated protein gap  Protein 8.8, alb 3.1. Elevated last admission as well.  HIV nonreactive  continue to monitor

## 2023-01-04 NOTE — PROGRESS NOTE ADULT - PROBLEM SELECTOR PLAN 2
Admitted by ortho service for septic arthritis of left shoulder 12/17 with MRSA culture and discharged on daptomycin. Afebrile and normal WBC. No erythema or tenderness at site  - ortho following, will follow up recs  - f/u blood culture; NGTD day 5  - Pain management with tramadol 25mg q6h for moderate and oxycodone 5mg q4h for severe pain; toradol dc'ed  - abx as above.  - remove PICC line

## 2023-01-04 NOTE — PROGRESS NOTE ADULT - PROBLEM SELECTOR PLAN 1
due to daptomycin, which has been d/c'ed; clinically improving, CPK normalizing; cont. IVF; monitor renal fxn, UOP, CPK; will add daptomycin to intolerance list in EMR

## 2023-01-04 NOTE — PROGRESS NOTE ADULT - PROBLEM SELECTOR PLAN 1
Admitted for septic arthritis of left shoulder 12/17 with MRSA culture and discharged on daptomycin now with labs c/w rhabdo and patient complaining of muscle soreness/pain with some weakness. Admits to not drinking much fluids. UA clear but with blood and few granular and hyaline casts and normal kidney function. s/p 1L NS in ED. Daptomycyin was d/c'ed.  CK downtrending.    Patient currently urinating 10 times per day. Patient with tingling in LUE. May be indicative of acute pathology- LUE with edema with no erythema, sensation intact and 5/5 MSK    Plan:  - LUE doppler without evidence of thrombus  - c/w BID CK and phos checks  - ID consulted, appreciate recs  - switch for vancomycin to linezolid 600 mg PO BID through 1.14  - c/w NS 200cc/hr, titrate with goal urine output of 200cc/hr.   - Monitor strict intake and output  - Monitor Cr  - ortho following appreciate recs Admitted for septic arthritis of left shoulder 12/17 with MRSA culture and discharged on daptomycin now with labs c/w rhabdo and patient complaining of muscle soreness/pain with some weakness. Admits to not drinking much fluids. UA clear but with blood and few granular and hyaline casts and normal kidney function. s/p 1L NS in ED. Daptomycyin was d/c'ed.  CK downtrending.    Patient currently urinating 10 times per day. Patient with tingling in LUE. May be indicative of acute pathology- LUE with edema with no erythema, sensation intact and 5/5 MSK    Plan:  - LUE doppler without evidence of thrombus  - c/w BID CK and phos checks  - ID consulted, appreciate recs  - switch for vancomycin to linezolid 600 mg PO BID through 1/14  - c/w NS 200cc/hr, titrate with goal urine output of 200cc/hr.   - Monitor strict intake and output  - Monitor Cr  - ortho following appreciate recs

## 2023-01-04 NOTE — DISCHARGE NOTE PROVIDER - NSDCFUSCHEDAPPT_GEN_ALL_CORE_FT
Fatmata France  [facility]  INFDISEASE [address]  Scheduled Appointment: 01/12/2023

## 2023-01-04 NOTE — PROGRESS NOTE ADULT - PROBLEM SELECTOR PLAN 3
Hb 10.7 on admission, baseline 9-10s. No active signs of bleeding. -Iron dlow, TIBC low; picture of anemia of chronic disease    Plan:  - Transfuse for Hb <7  - Maintain active type and screen

## 2023-01-04 NOTE — DIETITIAN INITIAL EVALUATION ADULT - PERTINENT MEDS FT
MEDICATIONS  (STANDING):  chlorhexidine 2% Cloths 1 Application(s) Topical daily  enoxaparin Injectable 40 milliGRAM(s) SubCutaneous every 12 hours  influenza   Vaccine 0.5 milliLiter(s) IntraMuscular once  lidocaine   4% Patch 1 Patch Transdermal daily  linezolid    Tablet 600 milliGRAM(s) Oral every 12 hours  magnesium oxide 400 milliGRAM(s) Oral once  metoprolol tartrate 50 milliGRAM(s) Oral two times a day  NIFEdipine XL 60 milliGRAM(s) Oral daily  senna 2 Tablet(s) Oral at bedtime  sodium chloride 0.9%. 1000 milliLiter(s) (200 mL/Hr) IV Continuous <Continuous>    MEDICATIONS  (PRN):  oxycodone    5 mG/acetaminophen 325 mG 1 Tablet(s) Oral every 6 hours PRN Moderate Pain (4 - 6)  oxyCODONE    IR 5 milliGRAM(s) Oral every 4 hours PRN Severe Pain (7 - 10)  traMADol 25 milliGRAM(s) Oral every 4 hours PRN Moderate Pain (4 - 6)

## 2023-01-04 NOTE — DIETITIAN INITIAL EVALUATION ADULT - PERTINENT LABORATORY DATA
01-04    137  |  104  |  9   ----------------------------<  111<H>  4.0   |  27  |  1.16    Ca    8.5      04 Jan 2023 05:58  Phos  3.3     01-04  Mg     1.7     01-04    TPro  8.5<H>  /  Alb  3.0<L>  /  TBili  0.3  /  DBili  x   /  AST  137<H>  /  ALT  137<H>  /  AlkPhos  144<H>  01-04  A1C with Estimated Average Glucose Result: 5.7 % (12-17-22 @ 08:14)

## 2023-01-05 ENCOUNTER — TRANSCRIPTION ENCOUNTER (OUTPATIENT)
Age: 53
End: 2023-01-05

## 2023-01-05 VITALS
OXYGEN SATURATION: 100 % | TEMPERATURE: 98 F | HEART RATE: 84 BPM | RESPIRATION RATE: 17 BRPM | SYSTOLIC BLOOD PRESSURE: 133 MMHG | DIASTOLIC BLOOD PRESSURE: 96 MMHG

## 2023-01-05 DIAGNOSIS — K59.00 CONSTIPATION, UNSPECIFIED: ICD-10-CM

## 2023-01-05 LAB
ANION GAP SERPL CALC-SCNC: 10 MMOL/L — SIGNIFICANT CHANGE UP (ref 5–17)
BUN SERPL-MCNC: 10 MG/DL — SIGNIFICANT CHANGE UP (ref 7–23)
CALCIUM SERPL-MCNC: 9.1 MG/DL — SIGNIFICANT CHANGE UP (ref 8.4–10.5)
CHLORIDE SERPL-SCNC: 101 MMOL/L — SIGNIFICANT CHANGE UP (ref 96–108)
CK SERPL-CCNC: 7672 U/L — HIGH (ref 30–200)
CO2 SERPL-SCNC: 26 MMOL/L — SIGNIFICANT CHANGE UP (ref 22–31)
CREAT SERPL-MCNC: 0.9 MG/DL — SIGNIFICANT CHANGE UP (ref 0.5–1.3)
CULTURE RESULTS: SIGNIFICANT CHANGE UP
CULTURE RESULTS: SIGNIFICANT CHANGE UP
EGFR: 103 ML/MIN/1.73M2 — SIGNIFICANT CHANGE UP
GLUCOSE SERPL-MCNC: 98 MG/DL — SIGNIFICANT CHANGE UP (ref 70–99)
HCT VFR BLD CALC: 31.8 % — LOW (ref 39–50)
HGB BLD-MCNC: 10.4 G/DL — LOW (ref 13–17)
MAGNESIUM SERPL-MCNC: 1.8 MG/DL — SIGNIFICANT CHANGE UP (ref 1.6–2.6)
MCHC RBC-ENTMCNC: 26.5 PG — LOW (ref 27–34)
MCHC RBC-ENTMCNC: 32.7 GM/DL — SIGNIFICANT CHANGE UP (ref 32–36)
MCV RBC AUTO: 81.1 FL — SIGNIFICANT CHANGE UP (ref 80–100)
NRBC # BLD: 0 /100 WBCS — SIGNIFICANT CHANGE UP (ref 0–0)
PHOSPHATE SERPL-MCNC: 3.7 MG/DL — SIGNIFICANT CHANGE UP (ref 2.5–4.5)
PLATELET # BLD AUTO: 499 K/UL — HIGH (ref 150–400)
POTASSIUM SERPL-MCNC: 3.9 MMOL/L — SIGNIFICANT CHANGE UP (ref 3.5–5.3)
POTASSIUM SERPL-SCNC: 3.9 MMOL/L — SIGNIFICANT CHANGE UP (ref 3.5–5.3)
RBC # BLD: 3.92 M/UL — LOW (ref 4.2–5.8)
RBC # FLD: 14.7 % — HIGH (ref 10.3–14.5)
SODIUM SERPL-SCNC: 137 MMOL/L — SIGNIFICANT CHANGE UP (ref 135–145)
SPECIMEN SOURCE: SIGNIFICANT CHANGE UP
SPECIMEN SOURCE: SIGNIFICANT CHANGE UP
WBC # BLD: 6.51 K/UL — SIGNIFICANT CHANGE UP (ref 3.8–10.5)
WBC # FLD AUTO: 6.51 K/UL — SIGNIFICANT CHANGE UP (ref 3.8–10.5)

## 2023-01-05 PROCEDURE — 36415 COLL VENOUS BLD VENIPUNCTURE: CPT

## 2023-01-05 PROCEDURE — 99285 EMERGENCY DEPT VISIT HI MDM: CPT

## 2023-01-05 PROCEDURE — 85027 COMPLETE CBC AUTOMATED: CPT

## 2023-01-05 PROCEDURE — 97165 OT EVAL LOW COMPLEX 30 MIN: CPT

## 2023-01-05 PROCEDURE — 85025 COMPLETE CBC W/AUTO DIFF WBC: CPT

## 2023-01-05 PROCEDURE — 83550 IRON BINDING TEST: CPT

## 2023-01-05 PROCEDURE — 81001 URINALYSIS AUTO W/SCOPE: CPT

## 2023-01-05 PROCEDURE — 80076 HEPATIC FUNCTION PANEL: CPT

## 2023-01-05 PROCEDURE — 84100 ASSAY OF PHOSPHORUS: CPT

## 2023-01-05 PROCEDURE — 87389 HIV-1 AG W/HIV-1&-2 AB AG IA: CPT

## 2023-01-05 PROCEDURE — 82728 ASSAY OF FERRITIN: CPT

## 2023-01-05 PROCEDURE — 86900 BLOOD TYPING SEROLOGIC ABO: CPT

## 2023-01-05 PROCEDURE — 85610 PROTHROMBIN TIME: CPT

## 2023-01-05 PROCEDURE — 83735 ASSAY OF MAGNESIUM: CPT

## 2023-01-05 PROCEDURE — 82550 ASSAY OF CK (CPK): CPT

## 2023-01-05 PROCEDURE — 80053 COMPREHEN METABOLIC PANEL: CPT

## 2023-01-05 PROCEDURE — 85730 THROMBOPLASTIN TIME PARTIAL: CPT

## 2023-01-05 PROCEDURE — 99239 HOSP IP/OBS DSCHRG MGMT >30: CPT | Mod: GC

## 2023-01-05 PROCEDURE — 93971 EXTREMITY STUDY: CPT

## 2023-01-05 PROCEDURE — 86901 BLOOD TYPING SEROLOGIC RH(D): CPT

## 2023-01-05 PROCEDURE — 86140 C-REACTIVE PROTEIN: CPT

## 2023-01-05 PROCEDURE — 85652 RBC SED RATE AUTOMATED: CPT

## 2023-01-05 PROCEDURE — 97161 PT EVAL LOW COMPLEX 20 MIN: CPT

## 2023-01-05 PROCEDURE — 80202 ASSAY OF VANCOMYCIN: CPT

## 2023-01-05 PROCEDURE — 87635 SARS-COV-2 COVID-19 AMP PRB: CPT

## 2023-01-05 PROCEDURE — 80048 BASIC METABOLIC PNL TOTAL CA: CPT

## 2023-01-05 PROCEDURE — 86850 RBC ANTIBODY SCREEN: CPT

## 2023-01-05 PROCEDURE — 82962 GLUCOSE BLOOD TEST: CPT

## 2023-01-05 PROCEDURE — 83540 ASSAY OF IRON: CPT

## 2023-01-05 PROCEDURE — 87040 BLOOD CULTURE FOR BACTERIA: CPT

## 2023-01-05 PROCEDURE — 76705 ECHO EXAM OF ABDOMEN: CPT

## 2023-01-05 RX ORDER — OXYCODONE HYDROCHLORIDE 5 MG/1
1 TABLET ORAL
Qty: 12 | Refills: 0
Start: 2023-01-05 | End: 2023-01-07

## 2023-01-05 RX ORDER — OXYCODONE HYDROCHLORIDE 5 MG/1
1 TABLET ORAL
Qty: 0 | Refills: 0 | DISCHARGE
Start: 2023-01-05

## 2023-01-05 RX ORDER — SENNA PLUS 8.6 MG/1
2 TABLET ORAL
Qty: 60 | Refills: 0
Start: 2023-01-05 | End: 2023-02-03

## 2023-01-05 RX ADMIN — Medication 60 MILLIGRAM(S): at 05:34

## 2023-01-05 RX ADMIN — LIDOCAINE 1 PATCH: 4 CREAM TOPICAL at 09:29

## 2023-01-05 RX ADMIN — OXYCODONE HYDROCHLORIDE 5 MILLIGRAM(S): 5 TABLET ORAL at 03:45

## 2023-01-05 RX ADMIN — ENOXAPARIN SODIUM 40 MILLIGRAM(S): 100 INJECTION SUBCUTANEOUS at 05:33

## 2023-01-05 RX ADMIN — OXYCODONE HYDROCHLORIDE 5 MILLIGRAM(S): 5 TABLET ORAL at 03:14

## 2023-01-05 RX ADMIN — LINEZOLID 600 MILLIGRAM(S): 600 INJECTION, SOLUTION INTRAVENOUS at 07:26

## 2023-01-05 RX ADMIN — Medication 50 MILLIGRAM(S): at 05:33

## 2023-01-05 NOTE — PROGRESS NOTE ADULT - PROBLEM SELECTOR PLAN 2
L shoulder septic arthritis; s/p washout on 12/17; OR culture growing MRSA; Ortho and ID following; cont. linezolid through 1/14, per ID recs; pain control (will d/c oxycodone, as there is a possible drug interaction w/ linezolid; can use Tylenol PRN); can d/c PICC line; monitor LUE swelling, elevate arm, doppler U/S negative for DVT

## 2023-01-05 NOTE — PROGRESS NOTE ADULT - PROBLEM SELECTOR PROBLEM 2
Arthritis, septic, shoulder

## 2023-01-05 NOTE — PROGRESS NOTE ADULT - PROVIDER SPECIALTY LIST ADULT
Orthopedics
Infectious Disease
Internal Medicine
Orthopedics
Hospitalist
Hospitalist
Internal Medicine

## 2023-01-05 NOTE — PROGRESS NOTE ADULT - ASSESSMENT
52M h/o obesity p/w L shoulder pain and swelling x 1 week, found to have L shoulder septic arthritis.  Now s/p washout on 12/17.  OR culture growing MRSA. Discharged on daptomycin; now readmitted with rhabdomyolysis. Subtherapeutic vancomycin level despite high doses.  - d/c vancomycin and start linezolid 600mg PO q12h to complete course thru 1/14/23 (please ensure patient provided with remaining doses of drug prior to discharge--while expensive, the cost is certainly lower than the cost of a second readmission if he is unable to fill this rx as an outpatient due to prohibitive costs imposed by the drug )   - d/c PICC  - add daptomycin to list of intolerances as previously stated; reaction: "rhabdomyolysis"    Will arrange post hospital f/u    Please reconsult with ?
52M PMHx septic arthritis (s/p washout 12/17), SVT, obesity presents to ED due to abnormal outpatient labs >20K CK while on daptomycin, admitted for management of rhabdomyolysis.
51 y/o M w/
52M PMHx septic arthritis (s/p washout 12/17), SVT, obesity presents to ED due to abnormal outpatient labs >20K CK while on daptomycin, admitted for management of rhabdomyolysis.
52M PMHx septic arthritis (s/p washout 12/17), SVT, obesity presents to ED due to abnormal outpatient labs >20K CK while on daptomycin, admitted for management of rhabdomyolysis.
53 y/o M w/
52M PMHx septic arthritis (s/p washout 12/17), SVT, obesity presents to ED due to abnormal outpatient labs >20K CK while on daptomycin, admitted for management of rhabdomyolysis.

## 2023-01-05 NOTE — PROGRESS NOTE ADULT - NSPROGADDITIONALINFOA_GEN_ALL_CORE
DVT ppx: LMWH  Dispo: anticipate d/c home tomorrow, pending improved CPK
DVT ppx: LMWH  Dispo: d/c home today

## 2023-01-05 NOTE — DISCHARGE NOTE NURSING/CASE MANAGEMENT/SOCIAL WORK - PATIENT PORTAL LINK FT
You can access the FollowMyHealth Patient Portal offered by [facility] by registering at the following website: [email or web address]. By joining [facility]’s FollowMyHealth portal, you will also be able to view your health information using other applications (apps) compatible with our system.

## 2023-01-05 NOTE — PROGRESS NOTE ADULT - PROBLEM SELECTOR PLAN 5
UA with few hyaline casts. Cr at baseline and producing clear/yellow urine without issue. Suspect 2/2 rhabdo.     Plan:  - Monitor Cr  - Management of rhabdo as above
likely due to rhabdomyolysis; #s normalizing; liver is enlarged on U/S, but otherwise unremarkable; monitor LFTs, will need outpatient f/u to ensure resolution
RESOLVED  UA with few hyaline casts. Cr at baseline and producing clear/yellow urine without issue. Suspect 2/2 rhabdo.     Plan:  - Monitor Cr  - Management of rhabdo as above
likely due to rhabdomyolysis; #s normalizing; liver is enlarged on U/S, but otherwise unremarkable; monitor LFTs, will need outpatient f/u to ensure resolution; OK to use Tylenol
UA with few hyaline casts. Cr at baseline and producing clear/yellow urine without issue. Suspect 2/2 rhabdo.     Plan:  - Monitor Cr  - Management of rhabdo as above
UA with few hyaline casts. Cr at baseline and producing clear/yellow urine without issue. Suspect 2/2 rhabdo.     Plan:  - Monitor Cr  - Management of rhabdo as above

## 2023-01-05 NOTE — PROGRESS NOTE ADULT - PROBLEM SELECTOR PLAN 4
cont. beta-blocker
 and  on admission. Had normal LFTs earlier this month during last admission. No abd pain on exam, no alcohol use and no recent hepatotoxic medication use. Suspect 2/2 rhabdo    Plan:  - Trend LFTs  - unlikely hepatic etiology likely source muscle breakdown    however given slight INR elevation (likely antibiotic use and vit K insuff given antimicrobial use) would check RUQ US to r/o hepatic disease
 and  on admission. Had normal LFTs earlier this month during last admission. No abd pain on exam, no alcohol use and no recent hepatotoxic medication use. Suspect 2/2 rhabdo    Plan:  - Trend LFTs  - unlikely hepatic etiology likely source muscle breakdown    however given slight INR elevation (likely antibiotic use and vit K insuff given antimicrobial use) would check RUQ US to r/o hepatic disease
cont. beta-blocker
 and  on admission. Had normal LFTs earlier this month during last admission. No abd pain on exam, no alcohol use and no recent hepatotoxic medication use. Suspect 2/2 rhabdo    Plan:  - Trend LFTs  - unlikely hepatic etiology likely source muscle breakdown    however given slight INR elevation (likely antibiotic use and vit K insuff given antimicrobial use) would check RUQ US to r/o hepatic disease
 and  on admission. Had normal LFTs earlier this month during last admission. No abd pain on exam, no alcohol use and no recent hepatotoxic medication use. Suspect 2/2 rhabdo    Plan:  - Trend LFTs  - unlikely hepatic etiology likely source muscle breakdown    however given slight INR elevation (likely antibiotic use and vit K insuff given antimicrobial use) would check RUQ US to r/o hepatic disease

## 2023-01-05 NOTE — PROGRESS NOTE ADULT - REASON FOR ADMISSION
rhabdomyolysis

## 2023-01-05 NOTE — PROGRESS NOTE ADULT - PROBLEM SELECTOR PLAN 1
due to daptomycin, which has been d/c'ed; clinically improving, CPK normalizing, < 10K, can transition to PO fluids; monitor renal fxn, UOP, CPK; daptomycin added to intolerance list in EMR

## 2023-01-05 NOTE — PROGRESS NOTE ADULT - PROBLEM SELECTOR PROBLEM 5
Transaminitis
Transaminitis
Acute tubular necrosis

## 2023-01-05 NOTE — PROGRESS NOTE ADULT - TIME BILLING
Management of septic arthritis
coordination of care  d/c planning  d/w Medicine residents
coordination of care  d/w Medicine residents

## 2023-01-05 NOTE — PROGRESS NOTE ADULT - SUBJECTIVE AND OBJECTIVE BOX
JOHNYEMELY SHAFFER  52y  Male    Patient is a 52y old  Male who presents with a chief complaint of rhabdomyolysis (02 Jan 2023 08:09)      INTERVAL HPI/OVERNIGHT EVENTS: As per night team, JAY. Patient was seen and examined at bedside. Patient states he has been urinating 10 times a day. He states he has L arm swelling with slight tingling extending into his hand. Otherwise ROS negative.      T(C): 37.2 (01-03-23 @ 08:33), Max: 37.2 (01-02-23 @ 12:54)  HR: 79 (01-03-23 @ 08:33) (79 - 99)  BP: 145/92 (01-03-23 @ 08:33) (129/85 - 162/95)  RR: 17 (01-03-23 @ 08:33) (17 - 18)  SpO2: 99% (01-03-23 @ 08:33) (98% - 99%)  Wt(kg): --Vital Signs Last 24 Hrs  T(C): 37.2 (03 Jan 2023 08:33), Max: 37.2 (02 Jan 2023 12:54)  T(F): 98.9 (03 Jan 2023 08:33), Max: 99 (02 Jan 2023 12:54)  HR: 79 (03 Jan 2023 08:33) (79 - 99)  BP: 145/92 (03 Jan 2023 08:33) (129/85 - 162/95)  BP(mean): --  RR: 17 (03 Jan 2023 08:33) (17 - 18)  SpO2: 99% (03 Jan 2023 08:33) (98% - 99%)    Parameters below as of 03 Jan 2023 08:33  Patient On (Oxygen Delivery Method): room air        PHYSICAL EXAM:  GENERAL: NAD; AAOx4  Neuro: CN2-12 grossly intact; speech clear; +2/4 DTR in UE and LE b/l; light touch sensation intact of UE and LE b/l  HEENT: NC/AT; MMM; neck supple; no nystagmus; no scleral icterus; nasal passages clear; no throid or LN enlargement  Heart: RRR; +s1 and s2; no MRG; non displaced PMI; no JVD  Lungs: CTA b/l; normal effort; no accesory muscle use; symmetric inhalation and exhalation; vesicular breath sounds; no WWR  GI: nondistended; nontender; normal bowel sounds [id number]  Extremities: +2 pulses in UE and LE b/l; L arm with non pitting edema without erythema and 3 scabbed lesions; R arm with PICC line without erythema, edema, or pain  Skin: skin dry and warm; no skin tenting; no rashes or lesions  MSK: normal tone; +5/5 strength in upper and lower extremities b/l    Consultant(s) Notes Reviewed:  [x ] YES  [ ] NO  Care Discussed with Consultants/Other Providers [ x] YES  [ ] NO    LABS:                        9.8    6.84  )-----------( 492      ( 03 Jan 2023 05:30 )             30.0     01-03    136  |  102  |  9   ----------------------------<  136<H>  3.8   |  24  |  0.92    Ca    8.7      03 Jan 2023 05:30  Phos  3.8     01-03  Mg     1.8     01-03    TPro  8.3  /  Alb  3.0<L>  /  TBili  0.3  /  DBili  x   /  AST  233<H>  /  ALT  142<H>  /  AlkPhos  144<H>  01-02    Iron 35, TIBC 176, %Sat 20, Ferritin 640/ 01-01-23 @ 07:20        CAPILLARY BLOOD GLUCOSE                MEDICATIONS  (STANDING):  chlorhexidine 2% Cloths 1 Application(s) Topical daily  influenza   Vaccine 0.5 milliLiter(s) IntraMuscular once  magnesium oxide 400 milliGRAM(s) Oral once  metoprolol tartrate 50 milliGRAM(s) Oral two times a day  NIFEdipine XL 60 milliGRAM(s) Oral daily  sodium chloride 0.9%. 1000 milliLiter(s) (200 mL/Hr) IV Continuous <Continuous>    MEDICATIONS  (PRN):  oxyCODONE    IR 5 milliGRAM(s) Oral every 4 hours PRN Severe Pain (7 - 10)  traMADol 25 milliGRAM(s) Oral every 4 hours PRN Moderate Pain (4 - 6)      RADIOLOGY & ADDITIONAL TESTS:    Imaging Personally Reviewed:  [ ] YES  [ ] NO  
Ortho Note    Patient s/p Left shoulder arthroscopy on 12/17 for septic arthritis, readmitted for rhabdomyolysis 2/2 daptomycin therapy for MSSA     Patient seen and examined at bedside this afternoon  Orthopedics called for worsening LUE swelling, pain   Pt states symptoms have progressed in the last 1-2 days   Endorses swelling distal biceps, proximal forearm  Denies associated numbness/loss of sensation     Vital Signs Last 24 Hrs  T(C): 37.2 (01-03-23 @ 12:58), Max: 37.2 (01-03-23 @ 08:33)  T(F): 98.9 (01-03-23 @ 12:58), Max: 98.9 (01-03-23 @ 08:33)  HR: 75 (01-03-23 @ 12:58) (75 - 79)  BP: 122/77 (01-03-23 @ 12:58) (122/77 - 145/92)  BP(mean): --  RR: 16 (01-03-23 @ 12:58) (16 - 17)  SpO2: 98% (01-03-23 @ 12:58) (98% - 99%)  AVSS    General: Pt Alert and oriented, NAD  Left upper extremity: portal sites healed, no drainage, mild generalized tenderness at the left shoulder  + swelling left distal biceps, generalized over left forearm, compartments mildly tense but compressible   Pulses: 2+ Radial pulse, extremity warm and well perfused  Sensation: intact to light touch right upper extremity, right forearm, right hand   Motor:  strength/AIN/PIN intact, 5/5 RUE         A/P: 52yMale s/p left shoulder arthroscopy for septic arthritis, currently on vancomycin, ortho called to assess LUE swelling     - Exam suspicious for LUE DVT, recommend doppler US to r/o DVT  - Left shoulder stable post op   - Recommend daily chlorhexidine wipes for current PICC line   - Pain Control: continue analagesics PRN   - DVT ppx: consider DVT ppx as indicated, pending doppler US   - PT, WBS: LUE WBAT   - Ortho will f/u in AM To monitor exam     Ortho Pager [phone]
Ortho Note    Pt seen and examined on morning rounds. Pt comfortable without complaints, pain controlled.  Denies CP, SOB, N/V, numbness/tingling     Vital Signs Last 24 Hrs  T(C): 37.2 (01-04-23 @ 05:03), Max: 37.2 (01-04-23 @ 05:03)  T(F): 99 (01-04-23 @ 05:03), Max: 99 (01-04-23 @ 05:03)  HR: 93 (01-04-23 @ 05:03) (93 - 93)  BP: 138/93 (01-04-23 @ 05:03) (138/93 - 138/93)  BP(mean): --  RR: 17 (01-04-23 @ 05:03) (17 - 17)  SpO2: 99% (01-04-23 @ 05:03) (99% - 99%)  I&O's Summary    02 Jan 2023 07:01  -  03 Jan 2023 07:00  --------------------------------------------------------  IN: 2200 mL / OUT: 3900 mL / NET: -1700 mL    03 Jan 2023 07:01  -  04 Jan 2023 05:57  --------------------------------------------------------  IN: 2920 mL / OUT: 4680 mL / NET: -1760 mL      Physical Exam:  General: Pt Alert and oriented, NAD  Left upper extremity: portal sites healed, no drainage, mild generalized tenderness at the left shoulder  + swelling left distal biceps, generalized over left forearm, compartments mildly tense but compressible   Pulses: 2+ Radial pulse, extremity warm and well perfused  Sensation: intact to light touch right upper extremity, right forearm, right hand   Motor:  strength/AIN/PIN intact, 5/5 RUE                             9.8    6.84  )-----------( 492      ( 03 Jan 2023 05:30 )             30.0     01-03    136  |  102  |  9   ----------------------------<  136<H>  3.8   |  24  |  0.92    Ca    8.7      03 Jan 2023 05:30  Phos  3.5     01-03  Mg     1.8     01-03    TPro  8.5<H>  /  Alb  2.9<L>  /  TBili  0.2  /  DBili  <0.2  /  AST  203<H>  /  ALT  139<H>  /  AlkPhos  142<H>  01-03      A/P: 52yMale s/p left shoulder arthroscopy for septic arthritis, currently on vancomycin, ortho called to assess LUE swelling, US of LUE neg for DVT    - LUE swelling, US neg for DVT  - Left shoulder stable post op   - Recommend daily chlorhexidine wipes for current PICC line   - Pain Control: continue analagesics PRN   - DVT ppx: consider DVT ppx as indicated, pending doppler US   - PT, WBS: LUE WBAT   - rest of care per primary team    Rj Dunbar, PGY-2  Ortho Pager [phone]
INTERVAL HPI/OVERNIGHT EVENTS: Resolved myalgias.    CONSTITUTIONAL:  Negative fever or chills, feels well, good appetite  EYES:  Negative  blurry vision or double vision  CARDIOVASCULAR:  Negative for chest pain or palpitations  RESPIRATORY:  Negative for cough, wheezing, or SOB   GASTROINTESTINAL:  Negative for nausea, vomiting, diarrhea, constipation, or abdominal pain  GENITOURINARY:  Negative frequency, urgency or dysuria  NEUROLOGIC:  No headache, confusion, dizziness, lightheadedness      ANTIBIOTICS/RELEVANT:    MEDICATIONS  (STANDING):  chlorhexidine 2% Cloths 1 Application(s) Topical daily  influenza   Vaccine 0.5 milliLiter(s) IntraMuscular once  metoprolol tartrate 50 milliGRAM(s) Oral two times a day  NIFEdipine XL 60 milliGRAM(s) Oral daily  sodium chloride 0.9%. 1000 milliLiter(s) (200 mL/Hr) IV Continuous <Continuous>    MEDICATIONS  (PRN):  oxyCODONE    IR 5 milliGRAM(s) Oral every 4 hours PRN Severe Pain (7 - 10)  traMADol 25 milliGRAM(s) Oral every 4 hours PRN Moderate Pain (4 - 6)        Vital Signs Last 24 Hrs  T(C): 37.2 (03 Jan 2023 12:58), Max: 37.2 (02 Jan 2023 17:14)  T(F): 98.9 (03 Jan 2023 12:58), Max: 99 (02 Jan 2023 17:14)  HR: 75 (03 Jan 2023 12:58) (75 - 99)  BP: 122/77 (03 Jan 2023 12:58) (122/77 - 162/95)  BP(mean): --  RR: 16 (03 Jan 2023 12:58) (16 - 18)  SpO2: 98% (03 Jan 2023 12:58) (98% - 99%)    Parameters below as of 03 Jan 2023 12:58  Patient On (Oxygen Delivery Method): room air        PHYSICAL EXAM:  Constitutional: NAD  Eyes: FLORY, EOMI  Ear/Nose/Throat: no oral lesion, no sinus tenderness on percussion	  Neck: no JVD, no lymphadenopathy, supple  Respiratory: CTA rocio  Cardiovascular: S1S2 RRR, no murmurs  Gastrointestinal:soft, (+) BS, no HSM  Extremities:no e/e/c  Vascular: DP Pulse:	right normal; left normal      LABS:                        9.8    6.84  )-----------( 492      ( 03 Jan 2023 05:30 )             30.0     01-03    136  |  102  |  9   ----------------------------<  136<H>  3.8   |  24  |  0.92    Ca    8.7      03 Jan 2023 05:30  Phos  3.8     01-03  Mg     1.8     01-03    TPro  8.5<H>  /  Alb  2.9<L>  /  TBili  0.2  /  DBili  <0.2  /  AST  203<H>  /  ALT  139<H>  /  AlkPhos  142<H>  01-03          MICROBIOLOGY: Culture - Abscess with Gram Stain (12.17.22 @ 09:23)    Gram Stain:   No organisms seen  Moderate White blood cells    -  Clindamycin: S 0.5    -  Daptomycin: S 1    -  Erythromycin: S <=0.25    -  Linezolid: S 2    -  Oxacillin: R >2    -  Rifampin: S <=1 Should not be used as monotherapy    -  Tetracycline: R >8    -  Trimethoprim/Sulfamethoxazole: S 2/38    -  Vancomycin: S 2    -  Vancomycin: S 1.5    Specimen Source: .Abscess 3. Left shoulder abscess    Culture Results:   Rare Methicillin Resistant Staphylococcus aureus  Result called to and read back by_ Ms. DINO Mckenzie RN  12/18/2022 15:12:12    Organism Identification: Methicillin resistant Staphylococcus aureus  Methicillin resistant Staphylococcus aureus    Organism: Methicillin resistant Staphylococcus aureus    Organism: Methicillin resistant Staphylococcus aureus    Method Type: ETEST    Method Type: GREGOR        RADIOLOGY & ADDITIONAL STUDIES: reviewed
  Patient is a 52y old  Male who presents with a chief complaint of MYIOATHY; ELEVATED CK     (04 Jan 2023 13:28)      INTERVAL HPI/OVERNIGHT EVENTS:    Pt. seen and examined earlier today  Pt. says he is feeling much better, reports myalgia in his legs has resolved, and L shoulder pain is improved/controlled w/ oxycodone  Pt. reports he is urinating frequently, and it is clear/yellow  No F/C    Review of Systems: 12 point review of systems otherwise negative    MEDICATIONS  (STANDING):  chlorhexidine 2% Cloths 1 Application(s) Topical daily  enoxaparin Injectable 40 milliGRAM(s) SubCutaneous every 12 hours  influenza   Vaccine 0.5 milliLiter(s) IntraMuscular once  lidocaine   4% Patch 1 Patch Transdermal daily  linezolid    Tablet 600 milliGRAM(s) Oral every 12 hours  metoprolol tartrate 50 milliGRAM(s) Oral two times a day  NIFEdipine XL 60 milliGRAM(s) Oral daily  senna 2 Tablet(s) Oral at bedtime  sodium chloride 0.9%. 1000 milliLiter(s) (200 mL/Hr) IV Continuous <Continuous>    MEDICATIONS  (PRN):  oxycodone    5 mG/acetaminophen 325 mG 1 Tablet(s) Oral every 6 hours PRN Moderate Pain (4 - 6)  oxyCODONE    IR 5 milliGRAM(s) Oral every 4 hours PRN Severe Pain (7 - 10)  traMADol 25 milliGRAM(s) Oral every 4 hours PRN Moderate Pain (4 - 6)      Allergies    No Known Allergies    Intolerances          Vital Signs Last 24 Hrs  T(C): 37 (04 Jan 2023 16:11), Max: 37.3 (04 Jan 2023 08:50)  T(F): 98.6 (04 Jan 2023 16:11), Max: 99.1 (04 Jan 2023 08:50)  HR: 88 (04 Jan 2023 16:11) (83 - 93)  BP: 155/99 (04 Jan 2023 16:11) (120/81 - 160/108)  BP(mean): --  RR: 16 (04 Jan 2023 16:11) (16 - 17)  SpO2: 99% (04 Jan 2023 16:11) (98% - 100%)    Parameters below as of 04 Jan 2023 16:11  Patient On (Oxygen Delivery Method): room air      CAPILLARY BLOOD GLUCOSE          01-03 @ 07:01  -  01-04 @ 07:00  --------------------------------------------------------  IN: 2920 mL / OUT: 4680 mL / NET: -1760 mL    01-04 @ 07:01 - 01-04 @ 18:08  --------------------------------------------------------  IN: 2940 mL / OUT: 650 mL / NET: 2290 mL        Physical Exam:  (earlier today)  Daily     Daily   General:  non-toxic and comfortable-appearing in NAD  HEENT:  MMM  CV:  RRR  Lungs:  CTA B/L  Abdomen:  soft NT ND  Extremities:  LUE edema  Skin:  WWP  Neuro:  AAOx3    LABS:                        9.9    7.20  )-----------( 506      ( 04 Jan 2023 05:58 )             31.4     01-04    137  |  104  |  9   ----------------------------<  111<H>  4.0   |  27  |  1.16    Ca    8.5      04 Jan 2023 05:58  Phos  3.3     01-04  Mg     1.7     01-04    TPro  8.5<H>  /  Alb  3.0<L>  /  TBili  0.3  /  DBili  x   /  AST  137<H>  /  ALT  137<H>  /  AlkPhos  144<H>  01-04    
INTERVAL HPI/OVERNIGHT EVENTS:  Patient was seen and examined at bedside. As per overnight team,  no o/n events, patient resting comfortably. Complaining of residual right arm pain, although much improved. Patient denies: fever, chills, lightheadedness, weakness, CP, palpitations, SOB, cough, N/V. ROS otherwise negative.    VITAL SIGNS:  T(F): 99 (23 @ 13:32)  HR: 91 (23 @ 13:32)  BP: 138/89 (23 @ 13:32)  RR: 18 (23 @ 13:32)  SpO2: 99% (23 @ 13:32)  Wt(kg): --      22 @ 07:  -  23 @ 07:00  --------------------------------------------------------  IN: 1600 mL / OUT: 800 mL / NET: 800 mL    23 @ 07:01  -  23 @ 15:38  --------------------------------------------------------  IN: 0 mL / OUT: 1250 mL / NET: -1250 mL        PHYSICAL EXAM:    GENERAL: Pt lying in bed comfortably in NAD  HEAD:  Atraumatic   EYES: EOMI, PERRL, conjunctiva and sclera clear  ENT: Moist mucous membranes  NECK: Supple, No JVD  CHEST/LUNG: Clear to auscultation bilaterally; No rales, rhonchi, wheezing or rubs. Unlabored respirations  HEART: Regular rate and rhythm; No murmurs, rubs, or gallops  ABDOMEN: Bowel sounds present; Soft, Nontender, Nondistended. No guarding or rigidity    EXTREMITIES:  2+ Peripheral Pulses, brisk capillary refill. No clubbing, cyanosis, or edema  NERVOUS SYSTEM:  Alert & Oriented X3, speech clear. Answers questions appropriately. Facial movements symmetrical, no facial droop, tongue protrusion midline. Full and equal 5/5 strength B/L upper and lower extremities. Sensation intact. No motor drift. No deficits   MSK: left shoulder decreased active and passive ROM, other joints with normal ROM  SKIN: No rashes or lesions, no erythema or tenderness at left shoulder site    MEDICATIONS  (STANDING):  influenza   Vaccine 0.5 milliLiter(s) IntraMuscular once  metoprolol tartrate 50 milliGRAM(s) Oral two times a day  NIFEdipine XL 60 milliGRAM(s) Oral daily  sodium chloride 0.9%. 1000 milliLiter(s) (200 mL/Hr) IV Continuous <Continuous>    MEDICATIONS  (PRN):  ketorolac   Injectable 15 milliGRAM(s) IV Push every 6 hours PRN Mild Pain (1 - 3)  oxyCODONE    IR 5 milliGRAM(s) Oral every 4 hours PRN Severe Pain (7 - 10)  traMADol 25 milliGRAM(s) Oral every 4 hours PRN Moderate Pain (4 - 6)      Allergies    No Known Allergies    Intolerances        LABS:                        10.2   6.37  )-----------( 621      ( 2023 07:20 )             32.6         137  |  102  |  9   ----------------------------<  93  4.1   |  26  |  0.80    Ca    8.7      2023 07:20  Phos  3.2       Mg     1.8         TPro  8.4<H>  /  Alb  2.8<L>  /  TBili  0.4  /  DBili  x   /  AST  305<H>  /  ALT  148<H>  /  AlkPhos  155<H>      PT/INR - ( 31 Dec 2022 17:27 )   PT: 14.5 sec;   INR: 1.22          PTT - ( 31 Dec 2022 17:27 )  PTT:30.1 sec  Urinalysis Basic - ( 31 Dec 2022 19:44 )    Color: Yellow / Appearance: Clear / S.025 / pH: x  Gluc: x / Ketone: NEGATIVE  / Bili: Negative / Urobili: 0.2 E.U./dL   Blood: x / Protein: 30 mg/dL / Nitrite: NEGATIVE   Leuk Esterase: NEGATIVE / RBC: < 5 /HPF / WBC 5-10 /HPF   Sq Epi: x / Non Sq Epi: 0-5 /HPF / Bacteria: Present /HPF        RADIOLOGY & ADDITIONAL TESTS:  Reviewed
  Patient is a 52y old  Male who presents with a chief complaint of rhabdomyolysis (04 Jan 2023 19:27)      INTERVAL HPI/OVERNIGHT EVENTS:    Pt. seen and examined earlier today  Pt. continues to feel much better  Pt. reports his myalgia has resolved and his L shoulder pain has improved; swelling unchanged from yesterday  Pt. ambulating in the halls  Pt. reports frequent UOP; urine is clear/yellow  Pt. c/o constipation    Review of Systems: 12 point review of systems otherwise negative    MEDICATIONS  (STANDING):  chlorhexidine 2% Cloths 1 Application(s) Topical daily  enoxaparin Injectable 40 milliGRAM(s) SubCutaneous every 12 hours  influenza   Vaccine 0.5 milliLiter(s) IntraMuscular once  lidocaine   4% Patch 1 Patch Transdermal daily  linezolid    Tablet 600 milliGRAM(s) Oral every 12 hours  metoprolol tartrate 50 milliGRAM(s) Oral two times a day  NIFEdipine XL 60 milliGRAM(s) Oral daily  senna 2 Tablet(s) Oral at bedtime  sodium chloride 0.9%. 1000 milliLiter(s) (200 mL/Hr) IV Continuous <Continuous>    MEDICATIONS  (PRN):  traMADol 25 milliGRAM(s) Oral every 4 hours PRN Moderate Pain (4 - 6)      Allergies    No Known Allergies    Intolerances    daptomycin (Other)        Vital Signs Last 24 Hrs  T(C): 36.8 (05 Jan 2023 12:47), Max: 37.2 (05 Jan 2023 05:24)  T(F): 98.3 (05 Jan 2023 12:47), Max: 99 (05 Jan 2023 05:24)  HR: 84 (05 Jan 2023 12:47) (66 - 88)  BP: 133/96 (05 Jan 2023 12:47) (133/96 - 167/98)  BP(mean): --  RR: 17 (05 Jan 2023 12:47) (16 - 17)  SpO2: 100% (05 Jan 2023 12:47) (98% - 100%)    Parameters below as of 05 Jan 2023 12:47  Patient On (Oxygen Delivery Method): room air      CAPILLARY BLOOD GLUCOSE      POCT Blood Glucose.: 155 mg/dL (04 Jan 2023 22:34)      01-04 @ 07:01  -  01-05 @ 07:00  --------------------------------------------------------  IN: 2940 mL / OUT: 1700 mL / NET: 1240 mL    01-05 @ 07:01  -  01-05 @ 15:48  --------------------------------------------------------  IN: 640 mL / OUT: 700 mL / NET: -60 mL        Physical Exam:  (earlier today)  Daily     Daily   General:  non-toxic and comfortable-appearing in NAD  HEENT:  MMM  Abdomen:  soft NT ND  Extremities:  stable LUE edema, not tense, skin warm, 2+ radial pulse  Skin:  WWP  Neuro:  AAOx3    LABS:                        10.4   6.51  )-----------( 499      ( 05 Jan 2023 06:30 )             31.8     01-05    137  |  101  |  10  ----------------------------<  98  3.9   |  26  |  0.90    Ca    9.1      05 Jan 2023 06:30  Phos  3.7     01-05  Mg     1.8     01-05    TPro  8.5<H>  /  Alb  3.0<L>  /  TBili  0.3  /  DBili  x   /  AST  137<H>  /  ALT  137<H>  /  AlkPhos  144<H>  01-04    
EMELY ALONZO  52y  Male    Patient is a 52y old  Male who presents with a chief complaint of rhabdomyolysis (2023 08:09)      INTERVAL HPI/OVERNIGHT EVENTS: As per night team, no overnight events. Patient was seen and examined at bedside. Patient with complaints of slight LUE pain. Patient denies any fever/chills, fatigue, nausea, vomiting, headache, stuffiness, sore throat, chest pain, palpitations, edema, SOB, cough, wheezing, changes in appetite, changes in bowel movements, constipation, diarrhea, abdominal pain, dizziness, fainting/LOC      T(C): 36.8 (23 @ 08:44), Max: 37.2 (23 @ 13:32)  HR: 85 (23 @ 08:44) (85 - 91)  BP: 133/79 (23 @ 08:44) (131/87 - 138/89)  RR: 17 (23 @ 08:44) (17 - 18)  SpO2: 97% (23 @ 08:44) (97% - 100%)  Wt(kg): --Vital Signs Last 24 Hrs  T(C): 36.8 (2023 08:44), Max: 37.2 (2023 13:32)  T(F): 98.3 (2023 08:44), Max: 99 (2023 13:32)  HR: 85 (2023 08:44) (85 - 91)  BP: 133/79 (2023 08:44) (131/87 - 138/89)  BP(mean): --  RR: 17 (2023 08:44) (17 - 18)  SpO2: 97% (2023 08:44) (97% - 100%)    Parameters below as of 2023 08:44  Patient On (Oxygen Delivery Method): room air        PHYSICAL EXAM:  GENERAL: NAD; good concentration   Neuro: AAOx3; CN2-12 grossly intact; speech clear; +2/4 DTR in UE and LE b/l; light touch sensation intact of UE and le b/l  HEENT: NC/AT; MMM; neck supple; good dentition; no nystagmus; no scleral icterus; nasal passages clear; no throid or LN enlargement  Heart: RRR; +s1 and s2; no MRG; non displaced PMI; no JVD  Lungs: CTA b/l; normal effort; no accessory muscle use; symmetric inhalation and exhalation; no WWR  GI: nondistended; nontender; normal bowel sounds [id number]  Extremities: +2 pulses in UE and LE b/l  Skin: skin dry and warm; no skin tenting; no rashes or lesions  MSK: L shoulder with portal sutures in place, 1+ edema with no erythema or tenderness to touch; Decreased ROM 2/2 to surgery; RUE and b/l LE NROM    Consultant(s) Notes Reviewed:  [x ] YES  [ ] NO  Care Discussed with Consultants/Other Providers [ x] YES  [ ] NO    LABS:                        10.3   6.50  )-----------( 544      ( 2023 06:43 )             32.5     01-    134<L>  |  100  |  11  ----------------------------<  90  3.9   |  26  |  0.91    Ca    8.8      2023 06:43  Phos  3.8     -  Mg     1.8     -    TPro  8.3  /  Alb  3.0<L>  /  TBili  0.3  /  DBili  x   /  AST  233<H>  /  ALT  142<H>  /  AlkPhos  144<H>      Iron 35, TIBC 176, %Sat 20, Ferritin 640/ 23 @ 07:20    PT/INR - ( 31 Dec 2022 17:27 )   PT: 14.5 sec;   INR: 1.22          PTT - ( 31 Dec 2022 17:27 )  PTT:30.1 sec  Urinalysis Basic - ( 31 Dec 2022 19:44 )    Color: Yellow / Appearance: Clear / S.025 / pH: x  Gluc: x / Ketone: NEGATIVE  / Bili: Negative / Urobili: 0.2 E.U./dL   Blood: x / Protein: 30 mg/dL / Nitrite: NEGATIVE   Leuk Esterase: NEGATIVE / RBC: < 5 /HPF / WBC 5-10 /HPF   Sq Epi: x / Non Sq Epi: 0-5 /HPF / Bacteria: Present /HPF      CAPILLARY BLOOD GLUCOSE            Urinalysis Basic - ( 31 Dec 2022 19:44 )    Color: Yellow / Appearance: Clear / S.025 / pH: x  Gluc: x / Ketone: NEGATIVE  / Bili: Negative / Urobili: 0.2 E.U./dL   Blood: x / Protein: 30 mg/dL / Nitrite: NEGATIVE   Leuk Esterase: NEGATIVE / RBC: < 5 /HPF / WBC 5-10 /HPF   Sq Epi: x / Non Sq Epi: 0-5 /HPF / Bacteria: Present /HPF        MEDICATIONS  (STANDING):  influenza   Vaccine 0.5 milliLiter(s) IntraMuscular once  metoprolol tartrate 50 milliGRAM(s) Oral two times a day  NIFEdipine XL 60 milliGRAM(s) Oral daily  sodium chloride 0.9%. 1000 milliLiter(s) (200 mL/Hr) IV Continuous <Continuous>  sodium chloride 0.9%. 1000 milliLiter(s) (200 mL/Hr) IV Continuous <Continuous>  vancomycin  IVPB 2000 milliGRAM(s) IV Intermittent every 12 hours    MEDICATIONS  (PRN):  ketorolac   Injectable 15 milliGRAM(s) IV Push every 6 hours PRN Mild Pain (1 - 3)  oxyCODONE    IR 5 milliGRAM(s) Oral every 4 hours PRN Severe Pain (7 - 10)  traMADol 25 milliGRAM(s) Oral every 4 hours PRN Moderate Pain (4 - 6)      RADIOLOGY & ADDITIONAL TESTS:    Imaging Personally Reviewed:  [ ] YES  [ ] NO

## 2023-01-05 NOTE — PROGRESS NOTE ADULT - PROBLEM SELECTOR PROBLEM 4
Transaminitis
SVT (supraventricular tachycardia)
Transaminitis
SVT (supraventricular tachycardia)

## 2023-01-11 DIAGNOSIS — I10 ESSENTIAL (PRIMARY) HYPERTENSION: ICD-10-CM

## 2023-01-11 DIAGNOSIS — M62.82 RHABDOMYOLYSIS: ICD-10-CM

## 2023-01-11 DIAGNOSIS — I47.1 SUPRAVENTRICULAR TACHYCARDIA: ICD-10-CM

## 2023-01-11 DIAGNOSIS — B95.62 METHICILLIN RESISTANT STAPHYLOCOCCUS AUREUS INFECTION AS THE CAUSE OF DISEASES CLASSIFIED ELSEWHERE: ICD-10-CM

## 2023-01-11 DIAGNOSIS — D75.839 THROMBOCYTOSIS, UNSPECIFIED: ICD-10-CM

## 2023-01-11 DIAGNOSIS — N17.0 ACUTE KIDNEY FAILURE WITH TUBULAR NECROSIS: ICD-10-CM

## 2023-01-11 DIAGNOSIS — M00.9 PYOGENIC ARTHRITIS, UNSPECIFIED: ICD-10-CM

## 2023-01-11 DIAGNOSIS — E66.9 OBESITY, UNSPECIFIED: ICD-10-CM

## 2023-01-11 DIAGNOSIS — K59.00 CONSTIPATION, UNSPECIFIED: ICD-10-CM

## 2023-01-12 ENCOUNTER — APPOINTMENT (OUTPATIENT)
Dept: INFECTIOUS DISEASE | Facility: CLINIC | Age: 53
End: 2023-01-12
Payer: MEDICAID

## 2023-01-12 VITALS
SYSTOLIC BLOOD PRESSURE: 180 MMHG | TEMPERATURE: 97.3 F | HEART RATE: 96 BPM | OXYGEN SATURATION: 100 % | WEIGHT: 315 LBS | BODY MASS INDEX: 37.19 KG/M2 | DIASTOLIC BLOOD PRESSURE: 103 MMHG | HEIGHT: 77 IN

## 2023-01-12 LAB
CULTURE RESULTS: NO GROWTH — SIGNIFICANT CHANGE UP
SPECIMEN SOURCE: SIGNIFICANT CHANGE UP

## 2023-01-12 PROCEDURE — 99214 OFFICE O/P EST MOD 30 MIN: CPT

## 2023-01-12 RX ORDER — LINEZOLID 600 MG/1
TABLET, FILM COATED ORAL
Refills: 0 | Status: ACTIVE | COMMUNITY

## 2023-01-12 NOTE — ASSESSMENT
[FreeTextEntry1] : 52M h/o obesity p/w L shoulder pain and swelling x 1 week, found to have L shoulder septic arthritis.  Now s/p washout on 12/17/22.  OR culture growing MRSA. Discharged on daptomycin; then readmitted with rhabdomyolysis. Subtherapeutic vancomycin level despite high doses. Discharged on PO linezolid until 1/13/23 to complete ~4 week antibiotic course from washout. Clinically resolving septic arthritis L shoulder.\par - ortho f/u (Dr. Ozuna)\par - internal medicine primary care referral for HTN management\par \par rtc prn

## 2023-01-12 NOTE — PHYSICAL EXAM
[General Appearance - Alert] : alert [General Appearance - In No Acute Distress] : in no acute distress [Auscultation Breath Sounds / Voice Sounds] : lungs were clear to auscultation bilaterally [Heart Rate And Rhythm] : heart rate was normal and rhythm regular [Heart Sounds] : normal S1 and S2 [Heart Sounds Gallop] : no gallops [Murmurs] : no murmurs [Heart Sounds Pericardial Friction Rub] : no pericardial rub [FreeTextEntry1] : partial abduction L shoulder [Skin Color & Pigmentation] : normal skin color and pigmentation [] : no rash [Deep Tendon Reflexes (DTR)] : deep tendon reflexes were 2+ and symmetric [Sensation] : the sensory exam was normal to light touch and pinprick [No Focal Deficits] : no focal deficits [Oriented To Time, Place, And Person] : oriented to person, place, and time [Affect] : the affect was normal

## 2023-01-12 NOTE — HISTORY OF PRESENT ILLNESS
[FreeTextEntry1] : Feels well. Myalgias resolved. L shoulder ROM overall improved. Slightly increased pain X 2 days. No fever or chills. Tolerating and adhering to linezolid.

## 2023-01-17 ENCOUNTER — APPOINTMENT (OUTPATIENT)
Dept: INTERNAL MEDICINE | Facility: CLINIC | Age: 53
End: 2023-01-17
Payer: MEDICAID

## 2023-01-17 VITALS
BODY MASS INDEX: 37.19 KG/M2 | TEMPERATURE: 99.2 F | HEART RATE: 108 BPM | WEIGHT: 315 LBS | HEIGHT: 77 IN | SYSTOLIC BLOOD PRESSURE: 140 MMHG | RESPIRATION RATE: 14 BRPM | OXYGEN SATURATION: 100 % | DIASTOLIC BLOOD PRESSURE: 95 MMHG

## 2023-01-17 DIAGNOSIS — Z00.00 ENCOUNTER FOR GENERAL ADULT MEDICAL EXAMINATION W/OUT ABNORMAL FINDINGS: ICD-10-CM

## 2023-01-17 DIAGNOSIS — R73.09 OTHER ABNORMAL GLUCOSE: ICD-10-CM

## 2023-01-17 PROCEDURE — 99495 TRANSJ CARE MGMT MOD F2F 14D: CPT | Mod: GC

## 2023-01-17 NOTE — ASSESSMENT
[FreeTextEntry1] : #HCM\par - Will discuss on next visit history of immunizations\par - CBC, lipid panel, A1c UTD given recent hospitalization\par - Did not receive flu or covid vaccine per our records\par - RTC in 2 weeks for CMP, BP mgmt\par

## 2023-01-17 NOTE — REVIEW OF SYSTEMS
[Negative] : Psychiatric [Joint Pain] : no joint pain [Joint Stiffness] : no joint stiffness [Muscle Pain] : no muscle pain [Joint Swelling] : no joint swelling [FreeTextEntry9] : Improved ROM

## 2023-01-17 NOTE — HISTORY OF PRESENT ILLNESS
[FreeTextEntry1] : CPE [de-identified] : This is a 52M with PMHx of obesity, GERD, new HTN who presents for comprehensive and post-hospital discharge. He was recently discharged from [facility] for L shoulder MRSA septic arthritis for which he was treated with full course of PO linezolid (finished  1/15/23). He reports improved ROM and denies any fevers, chills, new swelling or erythema. \par \par He is also here to establish care. He has not had a PCP in several years. Reports only previous PMHx was GERD. Reports taking PPI intermittently for heartburn. He has otherwise never been hospitalized. His only surgery was the shoulder washout (12/2022). Regarding diet, he eats home-cooked west [place] food (mainly rice, lamb), does not eat out at restaurants often. 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 Denies current/former etoh use, denies current/former tobacoc use, no recreational drug use. He has a wife in [place], does not have any chidren but is currently sexually active and hopes to eventually have kids. Lives with roommate in [place].

## 2023-01-17 NOTE — PHYSICAL EXAM
[No Joint Swelling] : no joint swelling [Grossly Normal Strength/Tone] : grossly normal strength/tone [Normal] : affect was normal and insight and judgment were intact [de-identified] : Limited ROM on L shoulder

## 2023-01-17 NOTE — END OF VISIT
[] : Resident [FreeTextEntry3] : Here to establish care after septic arthritis- MRSA of L shoulder \par completed course of linezolid\par Rhabdo- noted inpatient, CK and LFTS downtrending \par Will order CMP to eval \par Elevated BP without dx of HTN - now elevated x2, from ID appt and here, will start medication, trial norvasc 5mg qd\par PreDm- inpt A17 5.7, cw LSM \par Lipids WNL inpatient\par RTC in 2 weeks for BP check on norvasc, check CMP at that time

## 2023-01-18 LAB
CULTURE RESULTS: SIGNIFICANT CHANGE UP
SPECIMEN SOURCE: SIGNIFICANT CHANGE UP

## 2023-01-19 ENCOUNTER — APPOINTMENT (OUTPATIENT)
Dept: INFECTIOUS DISEASE | Facility: CLINIC | Age: 53
End: 2023-01-19

## 2023-02-01 LAB
CULTURE RESULTS: SIGNIFICANT CHANGE UP
SPECIMEN SOURCE: SIGNIFICANT CHANGE UP

## 2023-02-03 ENCOUNTER — APPOINTMENT (OUTPATIENT)
Dept: INTERNAL MEDICINE | Facility: CLINIC | Age: 53
End: 2023-02-03
Payer: MEDICAID

## 2023-02-03 VITALS
TEMPERATURE: 97 F | WEIGHT: 315 LBS | HEIGHT: 77 IN | SYSTOLIC BLOOD PRESSURE: 175 MMHG | BODY MASS INDEX: 37.19 KG/M2 | HEART RATE: 109 BPM | OXYGEN SATURATION: 100 % | DIASTOLIC BLOOD PRESSURE: 113 MMHG

## 2023-02-03 DIAGNOSIS — M00.012: ICD-10-CM

## 2023-02-03 DIAGNOSIS — R74.01 ELEVATION OF LEVELS OF LIVER TRANSAMINASE LEVELS: ICD-10-CM

## 2023-02-03 PROCEDURE — 99213 OFFICE O/P EST LOW 20 MIN: CPT | Mod: 25

## 2023-02-06 ENCOUNTER — NON-APPOINTMENT (OUTPATIENT)
Age: 53
End: 2023-02-06

## 2023-02-06 PROBLEM — M00.012 STAPHYLOCOCCAL ARTHRITIS OF LEFT SHOULDER: Status: RESOLVED | Noted: 2023-01-12 | Resolved: 2023-02-06

## 2023-02-06 PROBLEM — R74.01 TRANSAMINITIS: Status: ACTIVE | Noted: 2023-01-17

## 2023-02-06 LAB
CHOLEST SERPL-MCNC: 184 MG/DL
HDLC SERPL-MCNC: 50 MG/DL
LDLC SERPL CALC-MCNC: 106 MG/DL
NONHDLC SERPL-MCNC: 134 MG/DL
TRIGL SERPL-MCNC: 141 MG/DL

## 2023-02-06 NOTE — HISTORY OF PRESENT ILLNESS
[FreeTextEntry1] : Follow up [de-identified] : This is a 45M (birth date wrong on EMR) with PMHx of obesity, essential HTN, and GERD who presents to outpatient clinic for BP follow up. He was recently started on amlodipine 5mg qd and tolerates it well. Denies any symptoms of chest pain, SOB, dizziness, ivsion changes or other related symptoms. He reports better ROM with his shoulder given recent septic arthritis infection. He notes he has some family-related stressors but is trying to manage that stress and is open to seeing a therapist. He is trying to maintain a low salt diet. No other complaints.

## 2023-02-06 NOTE — ASSESSMENT
[FreeTextEntry1] : HCM\par - Needs COVID and flu shots\par - Will discuss immunizations on next visit\par - CMP, alb/cr, lipids today\par - RTC in 1 month for BP re-check\par

## 2023-02-06 NOTE — END OF VISIT
[] : Resident [FreeTextEntry3] : L shoulder septic arthritis resolved completely with good strength and ROM.  Pt now correlates the vaccine injection on that shoulder as the inciting event for the infection.  \par HTN f/u - increase amlodipine to 10

## 2023-02-27 LAB
ALBUMIN SERPL ELPH-MCNC: 4.1 G/DL
ALP BLD-CCNC: 88 U/L
ALT SERPL-CCNC: 26 U/L
ANION GAP SERPL CALC-SCNC: 13 MMOL/L
AST SERPL-CCNC: 21 U/L
BILIRUB SERPL-MCNC: 0.3 MG/DL
BUN SERPL-MCNC: 13 MG/DL
CALCIUM SERPL-MCNC: 9.4 MG/DL
CHLORIDE SERPL-SCNC: 103 MMOL/L
CO2 SERPL-SCNC: 24 MMOL/L
CREAT SERPL-MCNC: 0.93 MG/DL
CREAT SPEC-SCNC: 147 MG/DL
EGFR: 99 ML/MIN/1.73M2
GLUCOSE SERPL-MCNC: 113 MG/DL
MICROALBUMIN 24H UR DL<=1MG/L-MCNC: 2.1 MG/DL
MICROALBUMIN/CREAT 24H UR-RTO: 15 MG/G
POTASSIUM SERPL-SCNC: 3.9 MMOL/L
PROT SERPL-MCNC: 8.6 G/DL
SODIUM SERPL-SCNC: 139 MMOL/L

## 2023-03-03 ENCOUNTER — NON-APPOINTMENT (OUTPATIENT)
Age: 53
End: 2023-03-03

## 2023-03-03 ENCOUNTER — APPOINTMENT (OUTPATIENT)
Dept: INTERNAL MEDICINE | Facility: CLINIC | Age: 53
End: 2023-03-03

## 2023-03-06 ENCOUNTER — APPOINTMENT (OUTPATIENT)
Dept: INTERNAL MEDICINE | Facility: CLINIC | Age: 53
End: 2023-03-06
Payer: MEDICAID

## 2023-03-06 VITALS
SYSTOLIC BLOOD PRESSURE: 166 MMHG | DIASTOLIC BLOOD PRESSURE: 96 MMHG | HEIGHT: 77 IN | OXYGEN SATURATION: 100 % | WEIGHT: 315 LBS | HEART RATE: 88 BPM | BODY MASS INDEX: 37.19 KG/M2 | TEMPERATURE: 97.3 F

## 2023-03-06 DIAGNOSIS — R19.7 DIARRHEA, UNSPECIFIED: ICD-10-CM

## 2023-03-06 DIAGNOSIS — I10 ESSENTIAL (PRIMARY) HYPERTENSION: ICD-10-CM

## 2023-03-06 PROCEDURE — 83013 H PYLORI (C-13) BREATH: CPT

## 2023-03-06 PROCEDURE — 99214 OFFICE O/P EST MOD 30 MIN: CPT | Mod: 25,GC

## 2023-03-06 PROCEDURE — 93000 ELECTROCARDIOGRAM COMPLETE: CPT

## 2023-03-06 RX ORDER — PANTOPRAZOLE 40 MG/1
40 TABLET, DELAYED RELEASE ORAL DAILY
Qty: 30 | Refills: 2 | Status: ACTIVE | COMMUNITY
Start: 2023-03-06 | End: 1900-01-01

## 2023-03-07 ENCOUNTER — RX RENEWAL (OUTPATIENT)
Age: 53
End: 2023-03-07

## 2023-03-15 ENCOUNTER — APPOINTMENT (OUTPATIENT)
Dept: INTERNAL MEDICINE | Facility: CLINIC | Age: 53
End: 2023-03-15

## 2023-03-16 ENCOUNTER — NON-APPOINTMENT (OUTPATIENT)
Age: 53
End: 2023-03-16

## 2023-03-16 LAB — UREA BREATH TEST QL: POSITIVE

## 2023-03-16 RX ORDER — CLARITHROMYCIN 500 MG/1
500 TABLET, FILM COATED ORAL
Qty: 28 | Refills: 0 | Status: ACTIVE | COMMUNITY
Start: 2023-03-16 | End: 1900-01-01

## 2023-03-16 RX ORDER — OMEPRAZOLE 20 MG/1
20 TABLET, DELAYED RELEASE ORAL TWICE DAILY
Qty: 28 | Refills: 0 | Status: ACTIVE | COMMUNITY
Start: 2023-03-16 | End: 1900-01-01

## 2023-03-17 LAB
BASOPHILS # BLD AUTO: 0.04 K/UL
BASOPHILS NFR BLD AUTO: 0.7 %
EOSINOPHIL # BLD AUTO: 0.1 K/UL
EOSINOPHIL NFR BLD AUTO: 1.7 %
HCT VFR BLD CALC: 44.1 %
HGB BLD-MCNC: 13.5 G/DL
IMM GRANULOCYTES NFR BLD AUTO: 0.2 %
LYMPHOCYTES # BLD AUTO: 2.15 K/UL
LYMPHOCYTES NFR BLD AUTO: 37.1 %
MAN DIFF?: NORMAL
MCHC RBC-ENTMCNC: 26.1 PG
MCHC RBC-ENTMCNC: 30.6 GM/DL
MCV RBC AUTO: 85.3 FL
MONOCYTES # BLD AUTO: 0.46 K/UL
MONOCYTES NFR BLD AUTO: 7.9 %
NEUTROPHILS # BLD AUTO: 3.03 K/UL
NEUTROPHILS NFR BLD AUTO: 52.4 %
PLATELET # BLD AUTO: 275 K/UL
RBC # BLD: 5.17 M/UL
RBC # FLD: 16 %
WBC # FLD AUTO: 5.79 K/UL

## 2023-03-20 ENCOUNTER — APPOINTMENT (OUTPATIENT)
Dept: INTERNAL MEDICINE | Facility: CLINIC | Age: 53
End: 2023-03-20
Payer: MEDICAID

## 2023-03-20 VITALS
HEART RATE: 120 BPM | OXYGEN SATURATION: 100 % | BODY MASS INDEX: 37.19 KG/M2 | WEIGHT: 315 LBS | SYSTOLIC BLOOD PRESSURE: 160 MMHG | DIASTOLIC BLOOD PRESSURE: 100 MMHG | TEMPERATURE: 97.1 F | HEIGHT: 77 IN

## 2023-03-20 DIAGNOSIS — A04.8 OTHER SPECIFIED BACTERIAL INTESTINAL INFECTIONS: ICD-10-CM

## 2023-03-20 DIAGNOSIS — E04.1 NONTOXIC SINGLE THYROID NODULE: ICD-10-CM

## 2023-03-20 DIAGNOSIS — I10 ESSENTIAL (PRIMARY) HYPERTENSION: ICD-10-CM

## 2023-03-20 PROCEDURE — 99214 OFFICE O/P EST MOD 30 MIN: CPT | Mod: GC

## 2023-03-20 RX ORDER — CLARITHROMYCIN 500 MG/1
500 TABLET, FILM COATED ORAL
Qty: 28 | Refills: 0 | Status: ACTIVE | COMMUNITY
Start: 2023-03-20 | End: 1900-01-01

## 2023-03-20 RX ORDER — AMOXICILLIN 500 MG/1
500 CAPSULE ORAL TWICE DAILY
Qty: 56 | Refills: 0 | Status: ACTIVE | COMMUNITY
Start: 2023-03-20 | End: 1900-01-01

## 2023-03-20 RX ORDER — AMOXICILLIN 500 MG/1
500 CAPSULE ORAL TWICE DAILY
Qty: 56 | Refills: 0 | Status: ACTIVE | COMMUNITY
Start: 2023-03-16

## 2023-03-20 RX ORDER — AMLODIPINE BESYLATE 10 MG/1
10 TABLET ORAL
Qty: 30 | Refills: 2 | Status: ACTIVE | COMMUNITY
Start: 2023-01-17

## 2023-03-20 RX ORDER — OMEPRAZOLE 20 MG/1
20 CAPSULE, DELAYED RELEASE ORAL TWICE DAILY
Qty: 28 | Refills: 0 | Status: ACTIVE | COMMUNITY
Start: 2023-03-20 | End: 1900-01-01

## 2023-04-13 ENCOUNTER — APPOINTMENT (OUTPATIENT)
Dept: GASTROENTEROLOGY | Facility: CLINIC | Age: 53
End: 2023-04-13

## 2023-08-25 NOTE — PHYSICAL THERAPY INITIAL EVALUATION ADULT - PHYSICAL ASSIST/NONPHYSICAL ASSIST: SIT/STAND, REHAB EVAL
Nothing to eat after midnight. Y  Are you taking any blood thinners? When was the last day? N  Make sure to use Hibiclens prior to surgery. Y  Remove any jewelry and body piercings. Y  Do you wear glasses? If so, please bring a case to store them in. Are you having any Covid symptoms? N  Do you have any new rashes, infections, etc. that we should be aware of?N  Do you have a ride home the day of surgery? It cannot be a cab or medical transportation. Y  Verify surgery time and what time to arrive at hospital.1100
verbal cues/nonverbal cues (demo/gestures)/1 person assist

## 2024-02-20 NOTE — ED ADULT NURSE NOTE - EXTENSIONS OF SELF_ADULT
[FreeTextEntry1] : erectile dysfunction peyronies disease c/w Trimix can up titrate dose to 8 hx of TURP urinating well PVR bladder scan to r/o retention 122 PSA in 07/2024 FU x6 months
None

## 2024-07-22 NOTE — PHYSICAL THERAPY INITIAL EVALUATION ADULT - GENERAL OBSERVATIONS, REHAB EVAL
Writer left message requesting patient return call when available.    Spoke with PILAR Quevedo who cleared for session. Patient received semi-supine in NAD with +IV.

## 2024-10-31 ENCOUNTER — EMERGENCY (EMERGENCY)
Facility: HOSPITAL | Age: 54
LOS: 1 days | Discharge: HOME | End: 2024-10-31
Attending: EMERGENCY MEDICINE | Admitting: EMERGENCY MEDICINE
Payer: SELF-PAY

## 2024-10-31 VITALS
TEMPERATURE: 99 F | DIASTOLIC BLOOD PRESSURE: 109 MMHG | RESPIRATION RATE: 16 BRPM | OXYGEN SATURATION: 99 % | SYSTOLIC BLOOD PRESSURE: 179 MMHG | HEART RATE: 93 BPM

## 2024-10-31 VITALS
DIASTOLIC BLOOD PRESSURE: 103 MMHG | HEIGHT: 77 IN | TEMPERATURE: 100 F | OXYGEN SATURATION: 98 % | HEART RATE: 112 BPM | SYSTOLIC BLOOD PRESSURE: 154 MMHG | WEIGHT: 315 LBS | RESPIRATION RATE: 19 BRPM

## 2024-10-31 DIAGNOSIS — R21 RASH AND OTHER NONSPECIFIC SKIN ERUPTION: ICD-10-CM

## 2024-10-31 DIAGNOSIS — Z88.1 ALLERGY STATUS TO OTHER ANTIBIOTIC AGENTS: ICD-10-CM

## 2024-10-31 DIAGNOSIS — R00.0 TACHYCARDIA, UNSPECIFIED: ICD-10-CM

## 2024-10-31 DIAGNOSIS — M00.9 PYOGENIC ARTHRITIS, UNSPECIFIED: Chronic | ICD-10-CM

## 2024-10-31 LAB
ALBUMIN SERPL ELPH-MCNC: 3.8 G/DL — SIGNIFICANT CHANGE UP (ref 3.4–5)
ALP SERPL-CCNC: 79 U/L — SIGNIFICANT CHANGE UP (ref 40–120)
ALT FLD-CCNC: 22 U/L — SIGNIFICANT CHANGE UP (ref 12–42)
ANION GAP SERPL CALC-SCNC: 8 MMOL/L — LOW (ref 9–16)
APTT BLD: 32.5 SEC — SIGNIFICANT CHANGE UP (ref 24.5–35.6)
AST SERPL-CCNC: 17 U/L — SIGNIFICANT CHANGE UP (ref 15–37)
BASOPHILS # BLD AUTO: 0.04 K/UL — SIGNIFICANT CHANGE UP (ref 0–0.2)
BASOPHILS NFR BLD AUTO: 0.7 % — SIGNIFICANT CHANGE UP (ref 0–2)
BILIRUB SERPL-MCNC: 0.4 MG/DL — SIGNIFICANT CHANGE UP (ref 0.2–1.2)
BUN SERPL-MCNC: 11 MG/DL — SIGNIFICANT CHANGE UP (ref 7–23)
CALCIUM SERPL-MCNC: 8.8 MG/DL — SIGNIFICANT CHANGE UP (ref 8.5–10.5)
CHLORIDE SERPL-SCNC: 103 MMOL/L — SIGNIFICANT CHANGE UP (ref 96–108)
CO2 SERPL-SCNC: 29 MMOL/L — SIGNIFICANT CHANGE UP (ref 22–31)
CREAT SERPL-MCNC: 1.22 MG/DL — SIGNIFICANT CHANGE UP (ref 0.5–1.3)
EGFR: 70 ML/MIN/1.73M2 — SIGNIFICANT CHANGE UP
EOSINOPHIL # BLD AUTO: 0.29 K/UL — SIGNIFICANT CHANGE UP (ref 0–0.5)
EOSINOPHIL NFR BLD AUTO: 5.2 % — SIGNIFICANT CHANGE UP (ref 0–6)
GLUCOSE SERPL-MCNC: 122 MG/DL — HIGH (ref 70–99)
HCT VFR BLD CALC: 45.5 % — SIGNIFICANT CHANGE UP (ref 39–50)
HGB BLD-MCNC: 14.5 G/DL — SIGNIFICANT CHANGE UP (ref 13–17)
IMM GRANULOCYTES NFR BLD AUTO: 0.2 % — SIGNIFICANT CHANGE UP (ref 0–0.9)
INR BLD: 0.94 — SIGNIFICANT CHANGE UP (ref 0.85–1.16)
LACTATE BLDV-MCNC: 1.2 MMOL/L — SIGNIFICANT CHANGE UP (ref 0.5–2)
LYMPHOCYTES # BLD AUTO: 1.45 K/UL — SIGNIFICANT CHANGE UP (ref 1–3.3)
LYMPHOCYTES # BLD AUTO: 25.9 % — SIGNIFICANT CHANGE UP (ref 13–44)
MCHC RBC-ENTMCNC: 27.1 PG — SIGNIFICANT CHANGE UP (ref 27–34)
MCHC RBC-ENTMCNC: 31.9 G/DL — LOW (ref 32–36)
MCV RBC AUTO: 84.9 FL — SIGNIFICANT CHANGE UP (ref 80–100)
MONOCYTES # BLD AUTO: 0.57 K/UL — SIGNIFICANT CHANGE UP (ref 0–0.9)
MONOCYTES NFR BLD AUTO: 10.2 % — SIGNIFICANT CHANGE UP (ref 2–14)
NEUTROPHILS # BLD AUTO: 3.24 K/UL — SIGNIFICANT CHANGE UP (ref 1.8–7.4)
NEUTROPHILS NFR BLD AUTO: 57.8 % — SIGNIFICANT CHANGE UP (ref 43–77)
NRBC # BLD: 0 /100 WBCS — SIGNIFICANT CHANGE UP (ref 0–0)
PLATELET # BLD AUTO: 289 K/UL — SIGNIFICANT CHANGE UP (ref 150–400)
POTASSIUM SERPL-MCNC: 3.6 MMOL/L — SIGNIFICANT CHANGE UP (ref 3.5–5.3)
POTASSIUM SERPL-SCNC: 3.6 MMOL/L — SIGNIFICANT CHANGE UP (ref 3.5–5.3)
PROT SERPL-MCNC: 8.5 G/DL — HIGH (ref 6.4–8.2)
PROTHROM AB SERPL-ACNC: 11 SEC — SIGNIFICANT CHANGE UP (ref 9.9–13.4)
RBC # BLD: 5.36 M/UL — SIGNIFICANT CHANGE UP (ref 4.2–5.8)
RBC # FLD: 14.3 % — SIGNIFICANT CHANGE UP (ref 10.3–14.5)
SODIUM SERPL-SCNC: 140 MMOL/L — SIGNIFICANT CHANGE UP (ref 132–145)
WBC # BLD: 5.6 K/UL — SIGNIFICANT CHANGE UP (ref 3.8–10.5)
WBC # FLD AUTO: 5.6 K/UL — SIGNIFICANT CHANGE UP (ref 3.8–10.5)

## 2024-10-31 PROCEDURE — 99285 EMERGENCY DEPT VISIT HI MDM: CPT

## 2024-10-31 RX ORDER — VALACYCLOVIR HYDROCHLORIDE 500 MG/1
1000 TABLET ORAL ONCE
Refills: 0 | Status: COMPLETED | OUTPATIENT
Start: 2024-10-31 | End: 2024-10-31

## 2024-10-31 RX ORDER — SODIUM CHLORIDE 9 MG/ML
1000 INJECTION, SOLUTION INTRAMUSCULAR; INTRAVENOUS; SUBCUTANEOUS ONCE
Refills: 0 | Status: COMPLETED | OUTPATIENT
Start: 2024-10-31 | End: 2024-10-31

## 2024-10-31 RX ORDER — KETOROLAC TROMETHAMINE 30 MG/ML
15 INJECTION INTRAMUSCULAR; INTRAVENOUS ONCE
Refills: 0 | Status: DISCONTINUED | OUTPATIENT
Start: 2024-10-31 | End: 2024-10-31

## 2024-10-31 RX ORDER — VANCOMYCIN HYDROCHLORIDE 50 MG/ML
2000 KIT ORAL ONCE
Refills: 0 | Status: DISCONTINUED | OUTPATIENT
Start: 2024-10-31 | End: 2024-10-31

## 2024-10-31 RX ORDER — VALACYCLOVIR HYDROCHLORIDE 500 MG/1
1 TABLET ORAL
Qty: 20 | Refills: 0
Start: 2024-10-31 | End: 2024-11-09

## 2024-10-31 RX ORDER — IBUPROFEN 200 MG
1 TABLET ORAL
Qty: 28 | Refills: 0
Start: 2024-10-31 | End: 2024-11-06

## 2024-10-31 RX ORDER — SODIUM CHLORIDE 9 MG/ML
2800 INJECTION, SOLUTION INTRAMUSCULAR; INTRAVENOUS; SUBCUTANEOUS ONCE
Refills: 0 | Status: DISCONTINUED | OUTPATIENT
Start: 2024-10-31 | End: 2024-10-31

## 2024-10-31 RX ORDER — GABAPENTIN 300 MG/1
1 CAPSULE ORAL
Qty: 21 | Refills: 0
Start: 2024-10-31 | End: 2024-11-06

## 2024-10-31 RX ORDER — ACETAMINOPHEN 500 MG
975 TABLET ORAL ONCE
Refills: 0 | Status: COMPLETED | OUTPATIENT
Start: 2024-10-31 | End: 2024-10-31

## 2024-10-31 RX ADMIN — KETOROLAC TROMETHAMINE 15 MILLIGRAM(S): 30 INJECTION INTRAMUSCULAR; INTRAVENOUS at 19:34

## 2024-10-31 RX ADMIN — VALACYCLOVIR HYDROCHLORIDE 1000 MILLIGRAM(S): 500 TABLET ORAL at 19:34

## 2024-10-31 RX ADMIN — SODIUM CHLORIDE 1000 MILLILITER(S): 9 INJECTION, SOLUTION INTRAMUSCULAR; INTRAVENOUS; SUBCUTANEOUS at 19:36

## 2024-10-31 RX ADMIN — Medication 975 MILLIGRAM(S): at 19:34

## 2024-10-31 NOTE — ED PROVIDER NOTE - PATIENT PORTAL LINK FT
You can access the FollowMyHealth Patient Portal offered by  by registering at the following website: [email or web address]. By joining [employer]’s FollowMyHealth portal, you will also be able to view your health information using other applications (apps) compatible with our system.

## 2024-10-31 NOTE — ED ADULT NURSE NOTE - OBJECTIVE STATEMENT
aox3, breathing even and unlabored on Ra c.o rash to left thigh since tuesday. patient febrile in triage

## 2024-10-31 NOTE — ED PROVIDER NOTE - NSFOLLOWUPINSTRUCTIONS_ED_ALL_ED_FT
Shingles  A rash with blisters on the skin.  Shingles, or herpes zoster, is an infection. It gives you a skin rash and blisters. These infected areas may hurt a lot.    Shingles only happens if:  You've had chickenpox.  You've been given a shot called a vaccine to protect you from getting chickenpox. Shingles is rare in this case.  What are the causes?  Shingles is caused by a germ called the varicella-zoster virus. This is the same germ that causes chickenpox. After you're exposed to the germ, it stays in your body but is dormant. This means it isn't active.    Shingles happens if the germ becomes active again. This can happen years after you're first exposed to the germ.    What increases the risk?  You may be more likely to get shingles if:  You're older than 60 years of age.  You're under a lot of stress.  You have a weak immune system. The immune system is your body's defense system. It may be weak if:  You have human immunodeficiency virus (HIV).  You have acquired immunodeficiency syndrome (AIDS).  You have cancer.  You take medicines that weaken your immune system. These include organ transplant medicines.  What are the signs or symptoms?  The first symptoms of shingles may be itching, tingling, or pain. Your skin may feel like it's burning.    A few days or weeks later, you'll get a rash. Here's what you can expect:  The rash is likely to be on one side of your body.  The rash may be shaped like a belt or a band. Over time, it will turn into blisters filled with fluid.  The blisters will break open and change into scabs.  The scabs will dry up in about 2–3 weeks.  You may also have:  A fever.  Chills.  A headache.  Nausea.  How is this diagnosed?  Shingles is diagnosed with a skin exam. A sample called a culture may be taken from one of your blisters and sent to a lab. This will show if you have shingles.    How is this treated?  The rash may last for several weeks. There's no cure for shingles, but your health care provider may give you medicines. These medicines may:  Help with pain.  Help with itching.  Help with irritation and swelling.  Help you get better sooner.  Help to prevent long-term problems.  If the rash is on your face, you may need to see an eye doctor or an ear, nose, and throat (ENT) doctor.    Follow these instructions at home:  Medicines    Take your medicines only as told by your provider.  Put an anti-itch cream or numbing cream on the rash or blisters as told by your provider.  Relieving itching and discomfort    A bathtub filled with water.  To help with itching:  Put cold, wet cloths called cold compresses on the rash or blisters.  Take a cool bath. Try adding baking soda or dry oatmeal to the water. Do not bathe in hot water.  Use calamine lotion on the rash or blisters. You can get this type of lotion at the store.  Blister and rash care    Keep your rash covered with a loose bandage.  Wear loose clothes that don't rub on your rash.  Take care of your rash as told by your provider. Make sure you:  Wash your hands with soap and water for at least 20 seconds before and after you change your bandage. If you can't use soap and water, use hand .  Keep your rash and blisters clean by washing them with mild soap and cool water.  Change your bandage.  Check your rash every day for signs of infection. Check for:  More redness, swelling, or pain.  Fluid or blood.  Warmth.  Pus or a bad smell.  Do not scratch your rash. Do not pick at your blisters. To help you not scratch:  Keep your fingernails clean and cut short.  Try to wear gloves or mittens when you sleep.  General instructions    Rest.  Wash your hands often with soap and water for at least 20 seconds. If you can't use soap and water, use hand . Washing your hands lowers your chance of getting a skin infection.  Your infection can cause chickenpox in others. If you have blisters that aren't scabs yet, stay away from:  Babies.  Pregnant people.  Children who have eczema.  Older people who have organ transplants.  People who have a long-term, or chronic, illness.  Anyone who hasn't had chickenpox before.  Anyone who hasn't gotten the chickenpox vaccine.  How is this prevented?  Vaccines are the best way to prevent you from getting chickenpox or shingles. Talk with your provider about getting these shots.    Where to find more information  Centers for Disease Control and Prevention (CDC): cdc.gov  Contact a health care provider if:  Your pain doesn't get better with medicine.  Your pain doesn't get better after the rash heals.  You have any signs of infection around the rash.  Your rash or blisters get worse.  You have a fever or chills.  Get help right away if:  The rash is on your face or nose.  You have pain in your face or by your eye.  You lose feeling on one side of your face.  You have trouble seeing.  You have ear pain or ringing in your ear.  This information is not intended to replace advice given to you by your health care provider. Make sure you discuss any questions you have with your health care provider.

## 2024-10-31 NOTE — ED PROVIDER NOTE - OBJECTIVE STATEMENT
55 y/o M w/hx of getting a painful vesicular rash to the R side of his neck about once a year for the past few years, has never sought medical attention for it, p/w similar painful rash to inner R thigh and medial aspect of calf for 2 days with generalized fatigue/myalgias. No documented fever, though he hasn't tested himself. Has taken no meds for pain. Denies hx LE cellulitis. Asked about zoster infections but pt is unfamiliar with the virus/chicken pox/shingles.

## 2024-10-31 NOTE — ED PROVIDER NOTE - PHYSICAL EXAMINATION
VITAL SIGNS: I have reviewed nursing notes and confirm.  CONSTITUTIONAL: Well-developed; well-nourished; in no acute distress.  SKIN: Skin exam is warm and dry, + vesicular rash in clusters w/surrounding erythema in distribution of L4 on R thigh/calf  HEAD: Normocephalic; atraumatic.  EYES: PERRL, EOM intact; conjunctiva and sclera clear.  ENT: No nasal discharge; airway clear.  NECK: Supple; non tender.  CARD: + tachycardic regular rhythm  RESP: Unlabored   ABD: soft; non-distended; non-tender  EXT: Normal ROM. No cyanosis or edema. + rash as above which is somewhat TTP, Non-ttp all ext, distal pulses intact  NEURO: Alert, oriented. Grossly unremarkable.  PSYCH: Cooperative, appropriate.

## 2024-10-31 NOTE — ED PROVIDER NOTE - CLINICAL SUMMARY MEDICAL DECISION MAKING FREE TEXT BOX
Regarding history and physical exam consistent with shingles.  No leukocytosis to suggest systemic bacterial infection.  Culture sent.  Discharged home with Valtrex, pain management/ibuprofen.  Given return precautions, wound care instructions and anticipatory guidance

## 2024-10-31 NOTE — ED ADULT NURSE NOTE - NSFALLUNIVINTERV_ED_ALL_ED
Bed/Stretcher in lowest position, wheels locked, appropriate side rails in place/Call bell, personal items and telephone in reach/Instruct patient to call for assistance before getting out of bed/chair/stretcher/Non-slip footwear applied when patient is off stretcher/[place] to call system/Physically safe environment - no spills, clutter or unnecessary equipment/Purposeful proactive rounding/Room/bathroom lighting operational, light cord in reach

## 2025-02-25 NOTE — OCCUPATIONAL THERAPY INITIAL EVALUATION ADULT - MODALITIES TREATMENT COMMENTS
Educated pt on performing LUE A/AROM and pendulum exercises, with pt demo good carryover. 25-Feb-2025

## (undated) DEVICE — PACK SHOULDER ARTHROSCOPY

## (undated) DEVICE — GLV 8.5 PROTEXIS (WHITE)

## (undated) DEVICE — ARTHREX ARTHROSCOPY TUBING

## (undated) DEVICE — SOL IRR BAG NS 0.9% 3000ML

## (undated) DEVICE — VENODYNE/SCD SLEEVE CALF MEDIUM

## (undated) DEVICE — S&N ARTHROCARE WAND COBLATION WEREWOLF FLOW 90

## (undated) DEVICE — WARMING BLANKET LOWER ADULT